# Patient Record
Sex: FEMALE | Race: BLACK OR AFRICAN AMERICAN | NOT HISPANIC OR LATINO | Employment: FULL TIME | URBAN - METROPOLITAN AREA
[De-identification: names, ages, dates, MRNs, and addresses within clinical notes are randomized per-mention and may not be internally consistent; named-entity substitution may affect disease eponyms.]

---
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
## 2020-02-23 ENCOUNTER — HOSPITAL ENCOUNTER (EMERGENCY)
Facility: HOSPITAL | Age: 20
Discharge: HOME | End: 2020-02-23
Attending: EMERGENCY MEDICINE
Payer: MEDICAID

## 2020-02-23 VITALS
TEMPERATURE: 98 F | SYSTOLIC BLOOD PRESSURE: 110 MMHG | DIASTOLIC BLOOD PRESSURE: 78 MMHG | OXYGEN SATURATION: 100 % | RESPIRATION RATE: 18 BRPM | HEART RATE: 89 BPM | WEIGHT: 132 LBS | BODY MASS INDEX: 23.39 KG/M2 | HEIGHT: 63 IN

## 2020-02-23 DIAGNOSIS — J02.9 PHARYNGITIS, UNSPECIFIED ETIOLOGY: ICD-10-CM

## 2020-02-23 DIAGNOSIS — M65.4 DE QUERVAIN'S TENOSYNOVITIS, RIGHT: Primary | ICD-10-CM

## 2020-02-23 LAB
B-HCG UR QL: NEGATIVE
CTP QC/QA: YES
CTP QC/QA: YES
S PYO RRNA THROAT QL PROBE: NEGATIVE

## 2020-02-23 PROCEDURE — 99284 EMERGENCY DEPT VISIT MOD MDM: CPT | Mod: ER

## 2020-02-23 PROCEDURE — 81025 URINE PREGNANCY TEST: CPT | Mod: ER | Performed by: EMERGENCY MEDICINE

## 2020-02-23 PROCEDURE — 87081 CULTURE SCREEN ONLY: CPT

## 2020-02-23 PROCEDURE — 87880 STREP A ASSAY W/OPTIC: CPT | Mod: ER

## 2020-02-23 RX ORDER — PREDNISONE 20 MG/1
40 TABLET ORAL DAILY
Qty: 6 TABLET | Refills: 0 | Status: SHIPPED | OUTPATIENT
Start: 2020-02-23 | End: 2020-02-26

## 2020-02-23 RX ORDER — MELOXICAM 7.5 MG/1
7.5 TABLET ORAL DAILY
Qty: 12 TABLET | Refills: 0 | Status: SHIPPED | OUTPATIENT
Start: 2020-02-23 | End: 2023-11-09

## 2020-02-23 NOTE — ED PROVIDER NOTES
Encounter Date: 2/23/2020    SCRIBE #1 NOTE: I, Arabella De Jesus, am scribing for, and in the presence of,  JO ANN Morataya. I have scribed the following portions of the note - Other sections scribed: HPI and ROS.       History     Chief Complaint   Patient presents with    Sore Throat     x 1 day    Wrist Pain     x 1 week. PT is a       19 year old female complains of sore throat that started yesterday. She notes wrist pain for 1 week. Patient reports she is a  and has to carry heavy dishes. She denies trauma, injury, or numbness. She reports taking ibuprofen for wrist pain with little relief. She denies vomiting.    The history is provided by the patient. No  was used.     Review of patient's allergies indicates:   Allergen Reactions    Latex, natural rubber Itching     Past Medical History:   Diagnosis Date    Asthma     childhood    Constipation     Ectopic pregnancy      Past Surgical History:   Procedure Laterality Date    SALPINGECTOMY Left 12/7/2019    Procedure: SALPINGECTOMY;  Surgeon: Felecia Duran MD;  Location: [facility];  Service: OB/GYN;  Laterality: Left;     Family History   Problem Relation Age of Onset    Breast cancer Neg Hx     Colon cancer Neg Hx     Ovarian cancer Neg Hx      Social History     Tobacco Use    Smoking status: Never Smoker    Smokeless tobacco: Never Used   Substance Use Topics    Alcohol use: No    Drug use: No     Review of Systems   Constitutional: Negative.  Negative for fever.   HENT: Positive for sore throat.    Eyes: Negative.    Respiratory: Negative.  Negative for shortness of breath.    Cardiovascular: Negative.  Negative for chest pain.   Gastrointestinal: Negative.  Negative for nausea and vomiting.   Endocrine: Negative.    Genitourinary: Negative.  Negative for dysuria.   Musculoskeletal: Positive for arthralgias. Negative for myalgias.   Skin: Negative.  Negative for rash.   Allergic/Immunologic: Negative.     Neurological: Negative.  Negative for headaches.   Hematological: Negative.  Negative for adenopathy.   Psychiatric/Behavioral: Negative.  Negative for behavioral problems.   All other systems reviewed and are negative.      Physical Exam     Initial Vitals [02/23/20 1649]   BP Pulse Resp Temp SpO2   108/66 91 18 98.6 °F (37 °C) 99 %      MAP       --         Physical Exam    Nursing note and vitals reviewed.  Constitutional: She appears well-developed and well-nourished. She is not diaphoretic. No distress.   HENT:   Head: Normocephalic and atraumatic.   Nasal congestion present without active rhinorrhea. No sinus TTP. Mild b/l tender anterior cervical adenopathy. TMs intact without erythema or swelling; able to discern bony landmarks. No mastoid tenderness or swelling behind the ears. No pain with manipulation of external ears. No oropharyngeal edema, swelling, erythema, tonsillar exudates, uvula deviation, changes in phonation, trismus, drooling. No meningeal signs.      Eyes: Conjunctivae and EOM are normal. Right eye exhibits no discharge. Left eye exhibits no discharge.   Neck: Normal range of motion. No tracheal deviation present. No JVD present.   Cardiovascular: Normal rate, regular rhythm and normal heart sounds. Exam reveals no friction rub.    No murmur heard.  Pulmonary/Chest: Breath sounds normal. No stridor. No respiratory distress. She has no wheezes. She has no rhonchi. She has no rales. She exhibits no tenderness.   Musculoskeletal: Normal range of motion.   Positive Finkelstein's test.  No bony tenderness.  No erythema or swelling. Full ROM of all digits.  Capillary refill less than 2 sec.  Sensation intact and equal. No snuffbox tenderness.   Neurological: She is alert and oriented to person, place, and time.   Skin: Skin is warm and dry. No rash and no abscess noted. No erythema. No pallor.         ED Course   Procedures  Labs Reviewed   CULTURE, STREP A,  THROAT   POCT URINE PREGNANCY   POCT  RAPID STREP A          Imaging Results    None          Medical Decision Making:   History:   Old Medical Records: I decided to obtain old medical records.  Clinical Tests:   Lab Tests: Ordered and Reviewed  ED Management:  Presentation consistent with pharyngitis that is likely of viral etiology.  Strep negative. No peritonsillar abscess or evidence for deep space infection including for Kevin's.  No meningeal signs.  Tolerating p.o..  Wrist pain most consistent with a DeQuervain's tenosynovitis.  No history of trauma. No vascular compromise or infectious process, including for septic joint and flexor tenosynovitis.  Sent home with supportive care.  Advised use of splint at home and while working.  Advising follow-up with PCP.  Strict return precautions discussed with patient who is agreeable to the plan.            Scribe Attestation:   Scribe #1: I performed the above scribed service and the documentation accurately describes the services I performed. I attest to the accuracy of the note.    Scribe attestation: I, New Verduzco, personally performed the services described in this documentation. All medical record entries made by the scribe were at my direction and in my presence.  I have reviewed the chart and agree that the record reflects my personal performance and is accurate and complete                       Clinical Impression:     1. De Quervain's tenosynovitis, right    2. Pharyngitis, unspecified etiology                                New Verduzco PA-C  02/23/20 5350

## 2020-02-25 LAB — BACTERIA THROAT CULT: NORMAL

## 2020-03-16 ENCOUNTER — HOSPITAL ENCOUNTER (EMERGENCY)
Facility: HOSPITAL | Age: 20
Discharge: HOME | End: 2020-03-16
Attending: EMERGENCY MEDICINE
Payer: MEDICAID

## 2020-03-16 DIAGNOSIS — R10.84 GENERALIZED ABDOMINAL PAIN: Primary | ICD-10-CM

## 2020-03-16 PROBLEM — G89.18 POST-OPERATIVE PAIN: Status: RESOLVED | Noted: 2019-12-11 | Resolved: 2020-03-16

## 2020-03-16 LAB
ALBUMIN SERPL-MCNC: 4.2 G/DL (ref 3.3–5.5)
ALP SERPL-CCNC: 53 U/L (ref 42–141)
B-HCG UR QL: NEGATIVE
BILIRUB SERPL-MCNC: 0.6 MG/DL (ref 0.2–1.6)
BILIRUBIN, POC UA: NEGATIVE
BLOOD, POC UA: NEGATIVE
BUN SERPL-MCNC: 8 MG/DL (ref 7–22)
CALCIUM SERPL-MCNC: 9.2 MG/DL (ref 8–10.3)
CHLORIDE SERPL-SCNC: 105 MMOL/L (ref 98–108)
CLARITY, POC UA: CLEAR
COLOR, POC UA: YELLOW
CREAT SERPL-MCNC: 0.7 MG/DL (ref 0.6–1.2)
CTP QC/QA: YES
GLUCOSE SERPL-MCNC: 92 MG/DL (ref 73–118)
GLUCOSE, POC UA: NEGATIVE
KETONES, POC UA: NEGATIVE
LEUKOCYTE EST, POC UA: NEGATIVE
NITRITE, POC UA: NEGATIVE
PH UR STRIP: 6.5 [PH]
POC ALT (SGPT): 14 U/L (ref 10–47)
POC AST (SGOT): 22 U/L (ref 11–38)
POC TCO2: 26 MMOL/L (ref 18–33)
POTASSIUM BLD-SCNC: 3.5 MMOL/L (ref 3.6–5.1)
PROTEIN, POC UA: NEGATIVE
PROTEIN, POC: 7.5 G/DL (ref 6.4–8.1)
SODIUM BLD-SCNC: 141 MMOL/L (ref 128–145)
SPECIFIC GRAVITY, POC UA: 1.02
UROBILINOGEN, POC UA: 0.2 E.U./DL

## 2020-03-16 PROCEDURE — 99285 EMERGENCY DEPT VISIT HI MDM: CPT | Mod: 25,ER

## 2020-03-16 PROCEDURE — 81003 URINALYSIS AUTO W/O SCOPE: CPT | Mod: ER

## 2020-03-16 PROCEDURE — 63600175 PHARM REV CODE 636 W HCPCS: Mod: ER | Performed by: PHYSICIAN ASSISTANT

## 2020-03-16 PROCEDURE — 85025 COMPLETE CBC W/AUTO DIFF WBC: CPT | Mod: ER

## 2020-03-16 PROCEDURE — 96374 THER/PROPH/DIAG INJ IV PUSH: CPT | Mod: 59,ER

## 2020-03-16 PROCEDURE — 80053 COMPREHEN METABOLIC PANEL: CPT | Mod: ER

## 2020-03-16 PROCEDURE — 81025 URINE PREGNANCY TEST: CPT | Mod: ER | Performed by: EMERGENCY MEDICINE

## 2020-03-16 PROCEDURE — 25500020 PHARM REV CODE 255: Mod: ER | Performed by: EMERGENCY MEDICINE

## 2020-03-16 RX ORDER — KETOROLAC TROMETHAMINE 30 MG/ML
15 INJECTION, SOLUTION INTRAMUSCULAR; INTRAVENOUS
Status: COMPLETED | OUTPATIENT
Start: 2020-03-16 | End: 2020-03-16

## 2020-03-16 RX ORDER — IBUPROFEN 800 MG/1
800 TABLET ORAL EVERY 6 HOURS PRN
Qty: 20 TABLET | Refills: 0 | Status: SHIPPED | OUTPATIENT
Start: 2020-03-16 | End: 2023-11-09

## 2020-03-16 RX ADMIN — KETOROLAC TROMETHAMINE 15 MG: 30 INJECTION, SOLUTION INTRAMUSCULAR at 10:03

## 2020-03-16 RX ADMIN — IOHEXOL 75 ML: 350 INJECTION, SOLUTION INTRAVENOUS at 11:03

## 2020-03-17 VITALS
RESPIRATION RATE: 18 BRPM | WEIGHT: 132 LBS | HEIGHT: 63 IN | BODY MASS INDEX: 23.39 KG/M2 | DIASTOLIC BLOOD PRESSURE: 68 MMHG | OXYGEN SATURATION: 100 % | HEART RATE: 92 BPM | TEMPERATURE: 99 F | SYSTOLIC BLOOD PRESSURE: 112 MMHG

## 2020-03-17 NOTE — ED TRIAGE NOTES
Pt presents to ER with c/o lower abdominal pain at incision site with tenderness to palpation.  Denies any fever, nausea or vomiting.  Pt states she has had pain to area since the surgery.

## 2020-03-17 NOTE — ED TRIAGE NOTES
19 y.o. Female to ED with c.o. Pain and swelling around incision site. Patient report she had surgery for an ectopic pregnancy on 12/3/2020. Patient denies fever/ chills. Pattient endorses frequent urination, denies dysuria, denies hematuria. Patient endorses nausea and diarrhea, denies vomiting.

## 2020-03-17 NOTE — ED PROVIDER NOTES
Encounter Date: 3/16/2020    SCRIBE #1 NOTE: I, Saravanan Syed, am scribing for, and in the presence of,  JO ANN Thornton. I have scribed the following portions of the note - Other sections scribed: HPI, ROS, PE.       History     Chief Complaint   Patient presents with    Wound Check     19 year old female with pain with swelling to her incision site on her abdomen due to an ectopic pregnancy on 12/3/2019. States its been hurting since the surgery, but has worsened a week and a half ago. Patient also complains of lower back pain, urinary frequency, nausea, and diarrhea. Denies any fevers, chills, vaginal discharge, dysuria, hematuria, or vomiting.    The history is provided by the patient. No  was used.     Review of patient's allergies indicates:   Allergen Reactions    Latex, natural rubber Itching     Past Medical History:   Diagnosis Date    Asthma     childhood    Constipation     Ectopic pregnancy      Past Surgical History:   Procedure Laterality Date    SALPINGECTOMY Left 12/7/2019    Procedure: SALPINGECTOMY;  Surgeon: Felecia Duran MD;  Location: [facility];  Service: OB/GYN;  Laterality: Left;     Family History   Problem Relation Age of Onset    Breast cancer Neg Hx     Colon cancer Neg Hx     Ovarian cancer Neg Hx      Social History     Tobacco Use    Smoking status: Never Smoker    Smokeless tobacco: Never Used   Substance Use Topics    Alcohol use: No    Drug use: No     Review of Systems   Constitutional: Negative for chills and fever.   Gastrointestinal: Positive for abdominal pain, diarrhea and nausea. Negative for vomiting.   Genitourinary: Positive for frequency. Negative for dysuria, hematuria and vaginal discharge.   Musculoskeletal: Positive for back pain.   Skin: Positive for wound.       Physical Exam     Initial Vitals [03/16/20 2039]   BP Pulse Resp Temp SpO2   110/65 94 16 99 °F (37.2 °C) 100 %      MAP       --         Physical Exam    Nursing note and  vitals reviewed.  Constitutional: She appears well-developed and well-nourished.   HENT:   Head: Normocephalic and atraumatic.   Right Ear: External ear normal.   Left Ear: External ear normal.   Nose: Nose normal.   Eyes: Conjunctivae are normal.   Neck: Normal range of motion. Neck supple.   Cardiovascular: Normal rate and regular rhythm. Exam reveals no gallop and no friction rub.    No murmur heard.  Pulmonary/Chest: Breath sounds normal. No respiratory distress. She has no wheezes. She has no rhonchi. She has no rales.   Abdominal: Soft. There is tenderness. There is guarding.   Musculoskeletal: Normal range of motion.   Neurological: She is alert and oriented to person, place, and time.   Skin: Skin is warm and dry.   Psychiatric: She has a normal mood and affect.         ED Course   Procedures  Labs Reviewed   POCT CMP - Abnormal; Notable for the following components:       Result Value    POC Potassium 3.5 (*)     All other components within normal limits   POCT URINE PREGNANCY   POCT URINALYSIS W/O SCOPE   POCT URINALYSIS W/O SCOPE   POCT CBC   POCT CMP          Imaging Results          CT Abdomen Pelvis With Contrast (Final result)  Result time 03/16/20 23:32:25    Final result by Ron Marquez MD (03/16/20 23:32:25)                 Impression:      Retroverted uterus with small amount of free fluid in the cul de sac.    No abscess or inflammatory phlegmon identified.      Electronically signed by: Ron Marquez MD  Date:    03/16/2020  Time:    23:32             Narrative:    EXAMINATION:  CT ABDOMEN PELVIS WITH CONTRAST    CLINICAL HISTORY:  Infection, abdomen-pelvis;    TECHNIQUE:  Low dose axial images, sagittal and coronal reformations were obtained from the lung bases to the pubic symphysis following the IV administration of 75 mL of Omnipaque 350 .  Oral contrast was not administered.    COMPARISON:  December 11, 2019.    FINDINGS:  Abdomen:    - Lower thorax:Unremarkable.    - Lung bases: No  infiltrates and no nodules.    - Liver: No focal mass.    - Gallbladder: No calcified gallstones.    - Bile Ducts: No evidence of intra or extra hepatic biliary ductal dilation.    - Spleen: Negative.    - Kidneys: No mass or hydronephrosis.  Rotated and ptotic right kidney, unchanged.    - Adrenals: Unremarkable.    - Pancreas: No mass or peripancreatic fat stranding.    - Retroperitoneum:  No significant adenopathy.    - Vascular: No abdominal aortic aneurysm.    - Abdominal wall:  Unremarkable.    Pelvis:    No pelvic mass or adenopathy.  Retroverted uterus.  Small amount of free fluid in the cul de sac.    Bowel/Mesentery:    No evidence of bowel obstruction or inflammation.  Retrocecal appendix appears unremarkable.    Bones:  No acute osseous abnormality and no suspicious lytic or blastic lesion.                                 Medical Decision Making:   History:   Old Medical Records: I decided to obtain old medical records.  Initial Assessment:   Patient has tenderness and all abdominal quadrants.  Incision site has no evidence of erythema, fluctuance, or induration to suggest abscess.  Patient is afebrile and nontoxic-appearing.  Vitals are stable.  UPT is negative.  Urinalysis is negative for acute infection.  CBC and CMP unremarkable.  CT abdomen pelvis shows no acute intra-abdominal abnormality.  Patient denies vaginal discharge or pelvic pain therefore I doubt PID at this time. Etiology of patient's symptoms are unknown at this time.  Patient instructed to follow up with primary care physician.  I will discharge patient home with ibuprofen.  Patient is stable for discharge.  Clinical Tests:   Lab Tests: Ordered and Reviewed  The following lab test(s) were unremarkable: UPT            Scribe Attestation:   Scribe #1: I performed the above scribed service and the documentation accurately describes the services I performed. I attest to the accuracy of the note.    The document was produced by a scribe  under my direction and in my presence.  I agree with the content of the note and have made any necessary edits.    Mady CHERRY                       Clinical Impression:     1. Generalized abdominal pain            Disposition:   Disposition: Discharged  Condition: Stable     ED Disposition Condition    Discharge Stable        ED Prescriptions     Medication Sig Dispense Start Date End Date Auth. Provider    ibuprofen (ADVIL,MOTRIN) 800 MG tablet Take 1 tablet (800 mg total) by mouth every 6 (six) hours as needed. 20 tablet 3/16/2020  JO ANN Dill        Follow-up Information     Follow up With Specialties Details Why Contact Info    Main King MD General Practice   [address] [place] [address]  [place] [address]  [phone]                                       JO ANN Dill  03/16/20 1895

## 2021-04-15 ENCOUNTER — PATIENT MESSAGE (OUTPATIENT)
Dept: RESEARCH | Facility: HOSPITAL | Age: 21
End: 2021-04-15

## 2021-10-01 ENCOUNTER — HOSPITAL ENCOUNTER (EMERGENCY)
Facility: HOSPITAL | Age: 21
Discharge: HOME | End: 2021-10-01
Attending: EMERGENCY MEDICINE
Payer: MEDICAID

## 2021-10-01 VITALS
OXYGEN SATURATION: 100 % | DIASTOLIC BLOOD PRESSURE: 81 MMHG | HEART RATE: 71 BPM | SYSTOLIC BLOOD PRESSURE: 134 MMHG | WEIGHT: 150 LBS | TEMPERATURE: 99 F | BODY MASS INDEX: 26.58 KG/M2 | HEIGHT: 63 IN | RESPIRATION RATE: 18 BRPM

## 2021-10-01 DIAGNOSIS — R19.7 DIARRHEA, UNSPECIFIED TYPE: Primary | ICD-10-CM

## 2021-10-01 LAB
ALBUMIN SERPL-MCNC: 4.1 G/DL (ref 3.3–5.5)
ALP SERPL-CCNC: 48 U/L (ref 42–141)
B-HCG UR QL: NEGATIVE
BILIRUB SERPL-MCNC: 0.4 MG/DL (ref 0.2–1.6)
BILIRUBIN, POC UA: NEGATIVE
BLOOD, POC UA: NEGATIVE
BUN SERPL-MCNC: 10 MG/DL (ref 7–22)
CALCIUM SERPL-MCNC: 9.6 MG/DL (ref 8–10.3)
CHLORIDE SERPL-SCNC: 107 MMOL/L (ref 98–108)
CLARITY, POC UA: ABNORMAL
COLOR, POC UA: YELLOW
CREAT SERPL-MCNC: 0.8 MG/DL (ref 0.6–1.2)
CTP QC/QA: YES
CTP QC/QA: YES
FECAL OCCULT BLOOD, POC: NEGATIVE
GLUCOSE SERPL-MCNC: 112 MG/DL (ref 73–118)
GLUCOSE, POC UA: NEGATIVE
KETONES, POC UA: NEGATIVE
LEUKOCYTE EST, POC UA: NEGATIVE
NITRITE, POC UA: NEGATIVE
PH UR STRIP: 8.5 [PH]
POC ALT (SGPT): 16 U/L (ref 10–47)
POC AST (SGOT): 33 U/L (ref 11–38)
POC TCO2: 26 MMOL/L (ref 18–33)
POTASSIUM BLD-SCNC: 3.8 MMOL/L (ref 3.6–5.1)
PROTEIN, POC UA: ABNORMAL
PROTEIN, POC: 7.3 G/DL (ref 6.4–8.1)
SODIUM BLD-SCNC: 144 MMOL/L (ref 128–145)
SPECIFIC GRAVITY, POC UA: 1.02
UROBILINOGEN, POC UA: 0.2 E.U./DL

## 2021-10-01 PROCEDURE — 81003 URINALYSIS AUTO W/O SCOPE: CPT | Mod: ER

## 2021-10-01 PROCEDURE — 85025 COMPLETE CBC W/AUTO DIFF WBC: CPT | Mod: ER

## 2021-10-01 PROCEDURE — 82270 OCCULT BLOOD FECES: CPT | Mod: ER

## 2021-10-01 PROCEDURE — 25000003 PHARM REV CODE 250: Mod: ER | Performed by: EMERGENCY MEDICINE

## 2021-10-01 PROCEDURE — 99284 EMERGENCY DEPT VISIT MOD MDM: CPT | Mod: 25,ER

## 2021-10-01 PROCEDURE — 81025 URINE PREGNANCY TEST: CPT | Mod: ER | Performed by: EMERGENCY MEDICINE

## 2021-10-01 PROCEDURE — 80053 COMPREHEN METABOLIC PANEL: CPT | Mod: ER

## 2021-10-01 PROCEDURE — 96361 HYDRATE IV INFUSION ADD-ON: CPT | Mod: ER

## 2021-10-01 PROCEDURE — 96374 THER/PROPH/DIAG INJ IV PUSH: CPT | Mod: ER

## 2021-10-01 PROCEDURE — 63600175 PHARM REV CODE 636 W HCPCS: Mod: ER | Performed by: EMERGENCY MEDICINE

## 2021-10-01 RX ORDER — ONDANSETRON 8 MG/1
8 TABLET, ORALLY DISINTEGRATING ORAL EVERY 6 HOURS PRN
Qty: 12 TABLET | Refills: 0 | OUTPATIENT
Start: 2021-10-01 | End: 2022-06-03

## 2021-10-01 RX ORDER — ONDANSETRON 2 MG/ML
8 INJECTION INTRAMUSCULAR; INTRAVENOUS
Status: COMPLETED | OUTPATIENT
Start: 2021-10-01 | End: 2021-10-01

## 2021-10-01 RX ADMIN — ONDANSETRON 8 MG: 2 INJECTION INTRAMUSCULAR; INTRAVENOUS at 07:10

## 2021-10-01 RX ADMIN — SODIUM CHLORIDE 1000 ML: 0.9 INJECTION, SOLUTION INTRAVENOUS at 07:10

## 2021-10-04 ENCOUNTER — HOSPITAL ENCOUNTER (EMERGENCY)
Facility: HOSPITAL | Age: 21
Discharge: HOME | End: 2021-10-04
Attending: EMERGENCY MEDICINE
Payer: MEDICAID

## 2021-10-04 VITALS
HEART RATE: 59 BPM | BODY MASS INDEX: 26.58 KG/M2 | OXYGEN SATURATION: 100 % | RESPIRATION RATE: 18 BRPM | DIASTOLIC BLOOD PRESSURE: 57 MMHG | HEIGHT: 63 IN | TEMPERATURE: 99 F | SYSTOLIC BLOOD PRESSURE: 101 MMHG | WEIGHT: 150 LBS

## 2021-10-04 DIAGNOSIS — R11.2 NAUSEA VOMITING AND DIARRHEA: Primary | ICD-10-CM

## 2021-10-04 DIAGNOSIS — R19.7 NAUSEA VOMITING AND DIARRHEA: Primary | ICD-10-CM

## 2021-10-04 LAB
ALBUMIN SERPL BCP-MCNC: 4.3 G/DL (ref 3.5–5.2)
ALP SERPL-CCNC: 43 U/L (ref 55–135)
ALT SERPL W/O P-5'-P-CCNC: 21 U/L (ref 10–44)
ANION GAP SERPL CALC-SCNC: 13 MMOL/L (ref 8–16)
AST SERPL-CCNC: 40 U/L (ref 10–40)
B-HCG UR QL: NEGATIVE
BASOPHILS # BLD AUTO: 0.05 K/UL (ref 0–0.2)
BASOPHILS NFR BLD: 0.6 % (ref 0–1.9)
BILIRUB SERPL-MCNC: 0.5 MG/DL (ref 0.1–1)
BILIRUB UR QL STRIP: NEGATIVE
BUN SERPL-MCNC: 7 MG/DL (ref 6–20)
CALCIUM SERPL-MCNC: 9.6 MG/DL (ref 8.7–10.5)
CHLORIDE SERPL-SCNC: 109 MMOL/L (ref 95–110)
CLARITY UR: CLEAR
CO2 SERPL-SCNC: 20 MMOL/L (ref 23–29)
COLOR UR: YELLOW
CREAT SERPL-MCNC: 0.8 MG/DL (ref 0.5–1.4)
CTP QC/QA: YES
CTP QC/QA: YES
DIFFERENTIAL METHOD: ABNORMAL
EOSINOPHIL # BLD AUTO: 0.2 K/UL (ref 0–0.5)
EOSINOPHIL NFR BLD: 2.1 % (ref 0–8)
ERYTHROCYTE [DISTWIDTH] IN BLOOD BY AUTOMATED COUNT: 13 % (ref 11.5–14.5)
EST. GFR  (AFRICAN AMERICAN): >60 ML/MIN/1.73 M^2
EST. GFR  (NON AFRICAN AMERICAN): >60 ML/MIN/1.73 M^2
GLUCOSE SERPL-MCNC: 82 MG/DL (ref 70–110)
GLUCOSE UR QL STRIP: NEGATIVE
HCT VFR BLD AUTO: 36.3 % (ref 37–48.5)
HGB BLD-MCNC: 11.9 G/DL (ref 12–16)
HGB UR QL STRIP: NEGATIVE
IMM GRANULOCYTES # BLD AUTO: 0.02 K/UL (ref 0–0.04)
IMM GRANULOCYTES NFR BLD AUTO: 0.2 % (ref 0–0.5)
KETONES UR QL STRIP: ABNORMAL
LEUKOCYTE ESTERASE UR QL STRIP: NEGATIVE
LIPASE SERPL-CCNC: 11 U/L (ref 4–60)
LYMPHOCYTES # BLD AUTO: 2.6 K/UL (ref 1–4.8)
LYMPHOCYTES NFR BLD: 30.8 % (ref 18–48)
MAGNESIUM SERPL-MCNC: 2.1 MG/DL (ref 1.6–2.6)
MCH RBC QN AUTO: 29.5 PG (ref 27–31)
MCHC RBC AUTO-ENTMCNC: 32.8 G/DL (ref 32–36)
MCV RBC AUTO: 90 FL (ref 82–98)
MONOCYTES # BLD AUTO: 0.6 K/UL (ref 0.3–1)
MONOCYTES NFR BLD: 6.9 % (ref 4–15)
NEUTROPHILS # BLD AUTO: 5.1 K/UL (ref 1.8–7.7)
NEUTROPHILS NFR BLD: 59.4 % (ref 38–73)
NITRITE UR QL STRIP: NEGATIVE
NRBC BLD-RTO: 0 /100 WBC
PH UR STRIP: 7 [PH] (ref 5–8)
PLATELET # BLD AUTO: 248 K/UL (ref 150–450)
PMV BLD AUTO: 10.5 FL (ref 9.2–12.9)
POTASSIUM SERPL-SCNC: 3.5 MMOL/L (ref 3.5–5.1)
PROT SERPL-MCNC: 7.2 G/DL (ref 6–8.4)
PROT UR QL STRIP: ABNORMAL
RBC # BLD AUTO: 4.03 M/UL (ref 4–5.4)
SARS-COV-2 RDRP RESP QL NAA+PROBE: NEGATIVE
SODIUM SERPL-SCNC: 142 MMOL/L (ref 136–145)
SP GR UR STRIP: 1.03 (ref 1–1.03)
URN SPEC COLLECT METH UR: ABNORMAL
UROBILINOGEN UR STRIP-ACNC: NEGATIVE EU/DL
WBC # BLD AUTO: 8.55 K/UL (ref 3.9–12.7)

## 2021-10-04 PROCEDURE — U0002 COVID-19 LAB TEST NON-CDC: HCPCS | Performed by: EMERGENCY MEDICINE

## 2021-10-04 PROCEDURE — 96375 TX/PRO/DX INJ NEW DRUG ADDON: CPT

## 2021-10-04 PROCEDURE — 63600175 PHARM REV CODE 636 W HCPCS: Performed by: PHYSICIAN ASSISTANT

## 2021-10-04 PROCEDURE — 99284 EMERGENCY DEPT VISIT MOD MDM: CPT | Mod: 25

## 2021-10-04 PROCEDURE — 81025 URINE PREGNANCY TEST: CPT | Performed by: EMERGENCY MEDICINE

## 2021-10-04 PROCEDURE — 81003 URINALYSIS AUTO W/O SCOPE: CPT | Performed by: EMERGENCY MEDICINE

## 2021-10-04 PROCEDURE — 80053 COMPREHEN METABOLIC PANEL: CPT | Performed by: PHYSICIAN ASSISTANT

## 2021-10-04 PROCEDURE — 83735 ASSAY OF MAGNESIUM: CPT | Performed by: PHYSICIAN ASSISTANT

## 2021-10-04 PROCEDURE — 25000003 PHARM REV CODE 250: Performed by: PHYSICIAN ASSISTANT

## 2021-10-04 PROCEDURE — 83690 ASSAY OF LIPASE: CPT | Performed by: PHYSICIAN ASSISTANT

## 2021-10-04 PROCEDURE — 96365 THER/PROPH/DIAG IV INF INIT: CPT

## 2021-10-04 PROCEDURE — 85025 COMPLETE CBC W/AUTO DIFF WBC: CPT | Performed by: PHYSICIAN ASSISTANT

## 2021-10-04 RX ORDER — KETOROLAC TROMETHAMINE 30 MG/ML
15 INJECTION, SOLUTION INTRAMUSCULAR; INTRAVENOUS
Status: COMPLETED | OUTPATIENT
Start: 2021-10-04 | End: 2021-10-04

## 2021-10-04 RX ORDER — DICYCLOMINE HYDROCHLORIDE 20 MG/1
20 TABLET ORAL 2 TIMES DAILY PRN
Qty: 10 TABLET | Refills: 0 | Status: SHIPPED | OUTPATIENT
Start: 2021-10-04 | End: 2021-11-03

## 2021-10-04 RX ORDER — ONDANSETRON 2 MG/ML
4 INJECTION INTRAMUSCULAR; INTRAVENOUS
Status: COMPLETED | OUTPATIENT
Start: 2021-10-04 | End: 2021-10-04

## 2021-10-04 RX ORDER — FAMOTIDINE 10 MG/ML
20 INJECTION INTRAVENOUS
Status: COMPLETED | OUTPATIENT
Start: 2021-10-04 | End: 2021-10-04

## 2021-10-04 RX ORDER — PROMETHAZINE HYDROCHLORIDE 25 MG/1
25 TABLET ORAL EVERY 6 HOURS PRN
Qty: 30 TABLET | Refills: 0 | Status: SHIPPED | OUTPATIENT
Start: 2021-10-04 | End: 2023-11-09

## 2021-10-04 RX ADMIN — FAMOTIDINE 20 MG: 10 INJECTION INTRAVENOUS at 02:10

## 2021-10-04 RX ADMIN — ONDANSETRON 4 MG: 2 INJECTION INTRAMUSCULAR; INTRAVENOUS at 03:10

## 2021-10-04 RX ADMIN — SODIUM CHLORIDE 1000 ML: 0.9 INJECTION, SOLUTION INTRAVENOUS at 01:10

## 2021-10-04 RX ADMIN — KETOROLAC TROMETHAMINE 15 MG: 30 INJECTION, SOLUTION INTRAMUSCULAR at 03:10

## 2021-10-04 RX ADMIN — PROMETHAZINE HYDROCHLORIDE 25 MG: 25 INJECTION INTRAMUSCULAR; INTRAVENOUS at 02:10

## 2021-10-15 ENCOUNTER — OFFICE VISIT (OUTPATIENT)
Dept: URGENT CARE | Facility: CLINIC | Age: 21
End: 2021-10-15
Payer: MEDICAID

## 2021-10-15 VITALS
RESPIRATION RATE: 19 BRPM | HEART RATE: 79 BPM | HEIGHT: 63 IN | TEMPERATURE: 99 F | DIASTOLIC BLOOD PRESSURE: 75 MMHG | WEIGHT: 150 LBS | SYSTOLIC BLOOD PRESSURE: 118 MMHG | OXYGEN SATURATION: 97 % | BODY MASS INDEX: 26.58 KG/M2

## 2021-10-15 DIAGNOSIS — K21.9 GASTROESOPHAGEAL REFLUX DISEASE WITHOUT ESOPHAGITIS: Primary | ICD-10-CM

## 2021-10-15 DIAGNOSIS — Z32.00 POSSIBLE PREGNANCY, NOT CONFIRMED: ICD-10-CM

## 2021-10-15 DIAGNOSIS — K59.00 CONSTIPATION, UNSPECIFIED CONSTIPATION TYPE: ICD-10-CM

## 2021-10-15 DIAGNOSIS — R11.0 NAUSEA: ICD-10-CM

## 2021-10-15 PROBLEM — L29.9 ITCH: Status: ACTIVE | Noted: 2021-10-15

## 2021-10-15 PROBLEM — F41.1 GENERALIZED ANXIETY DISORDER: Status: ACTIVE | Noted: 2021-10-15

## 2021-10-15 PROBLEM — Z20.2 EXPOSURE TO SEXUALLY TRANSMITTED DISEASE (STD): Status: ACTIVE | Noted: 2021-10-15

## 2021-10-15 PROBLEM — L30.9 ECZEMA: Status: ACTIVE | Noted: 2021-10-15

## 2021-10-15 PROBLEM — R07.9 CHEST PAIN: Status: ACTIVE | Noted: 2021-10-15

## 2021-10-15 PROBLEM — N64.89 PENDULOUS BREAST: Status: ACTIVE | Noted: 2021-10-15

## 2021-10-15 PROBLEM — A56.2 CHLAMYDIAL INFECTION OF GENITOURINARY TRACT: Status: ACTIVE | Noted: 2021-07-13

## 2021-10-15 PROBLEM — Z20.822 EXPOSURE TO COVID-19 VIRUS: Status: ACTIVE | Noted: 2021-10-15

## 2021-10-15 PROBLEM — B00.9 HERPES SIMPLEX VIRAL INFECTION: Status: ACTIVE | Noted: 2021-07-12

## 2021-10-15 PROBLEM — F41.9 ANXIETY: Status: ACTIVE | Noted: 2020-02-11

## 2021-10-15 PROBLEM — Z87.59 HISTORY OF ECTOPIC PREGNANCY: Status: ACTIVE | Noted: 2021-08-06

## 2021-10-15 LAB
B-HCG UR QL: NEGATIVE
BILIRUB UR QL STRIP: NEGATIVE
CTP QC/QA: YES
CTP QC/QA: YES
GLUCOSE UR QL STRIP: NEGATIVE
KETONES UR QL STRIP: POSITIVE
LEUKOCYTE ESTERASE UR QL STRIP: NEGATIVE
PH, POC UA: 6 (ref 5–8)
POC BLOOD, URINE: NEGATIVE
POC NITRATES, URINE: NEGATIVE
PROT UR QL STRIP: NEGATIVE
SARS-COV-2 RDRP RESP QL NAA+PROBE: NEGATIVE
SP GR UR STRIP: 1.02 (ref 1–1.03)
UROBILINOGEN UR STRIP-ACNC: ABNORMAL (ref 0.1–1.1)

## 2021-10-15 PROCEDURE — 81025 URINE PREGNANCY TEST: CPT | Mod: S$GLB,,, | Performed by: NURSE PRACTITIONER

## 2021-10-15 PROCEDURE — 81025 POCT URINE PREGNANCY: ICD-10-PCS | Mod: S$GLB,,, | Performed by: NURSE PRACTITIONER

## 2021-10-15 PROCEDURE — 99214 PR OFFICE/OUTPT VISIT, EST, LEVL IV, 30-39 MIN: ICD-10-PCS | Mod: 25,S$GLB,CS, | Performed by: NURSE PRACTITIONER

## 2021-10-15 PROCEDURE — 99214 OFFICE O/P EST MOD 30 MIN: CPT | Mod: 25,S$GLB,CS, | Performed by: NURSE PRACTITIONER

## 2021-10-15 PROCEDURE — 81003 URINALYSIS AUTO W/O SCOPE: CPT | Mod: QW,S$GLB,, | Performed by: NURSE PRACTITIONER

## 2021-10-15 PROCEDURE — U0002 COVID-19 LAB TEST NON-CDC: HCPCS | Mod: QW,S$GLB,, | Performed by: NURSE PRACTITIONER

## 2021-10-15 PROCEDURE — U0002: ICD-10-PCS | Mod: QW,S$GLB,, | Performed by: NURSE PRACTITIONER

## 2021-10-15 PROCEDURE — 81003 POCT URINALYSIS, DIPSTICK, MANUAL, W/O SCOPE: ICD-10-PCS | Mod: QW,S$GLB,, | Performed by: NURSE PRACTITIONER

## 2021-10-15 RX ORDER — TIZANIDINE 4 MG/1
4 TABLET ORAL 2 TIMES DAILY PRN
COMMUNITY
Start: 2021-07-07 | End: 2023-11-09

## 2021-10-15 RX ORDER — MEDROXYPROGESTERONE ACETATE 150 MG/ML
150 INJECTION, SUSPENSION INTRAMUSCULAR
COMMUNITY
Start: 2021-07-29 | End: 2023-11-09

## 2021-10-15 RX ORDER — OMEPRAZOLE 40 MG/1
CAPSULE, DELAYED RELEASE ORAL
COMMUNITY
Start: 2021-07-21 | End: 2021-10-15

## 2021-10-15 RX ORDER — OMEPRAZOLE 20 MG/1
20 CAPSULE, DELAYED RELEASE ORAL DAILY PRN
Qty: 30 CAPSULE | Refills: 0 | Status: SHIPPED | OUTPATIENT
Start: 2021-10-15 | End: 2023-11-09

## 2021-10-15 RX ORDER — TRIAMCINOLONE ACETONIDE 1 MG/G
OINTMENT TOPICAL 2 TIMES DAILY
COMMUNITY
Start: 2021-07-07

## 2021-10-15 RX ORDER — OMEPRAZOLE 40 MG/1
40 CAPSULE, DELAYED RELEASE ORAL DAILY
COMMUNITY
Start: 2021-07-21 | End: 2021-10-15

## 2021-10-15 RX ORDER — TRIAMCINOLONE ACETONIDE 1 MG/G
1 OINTMENT TOPICAL 2 TIMES DAILY
COMMUNITY

## 2021-10-15 RX ORDER — TIZANIDINE 4 MG/1
TABLET ORAL
COMMUNITY
Start: 2021-07-07 | End: 2023-11-09

## 2021-10-29 ENCOUNTER — HOSPITAL ENCOUNTER (EMERGENCY)
Facility: HOSPITAL | Age: 21
Discharge: HOME | End: 2021-10-29
Attending: EMERGENCY MEDICINE
Payer: MEDICAID

## 2021-10-29 VITALS
TEMPERATURE: 99 F | OXYGEN SATURATION: 99 % | HEIGHT: 63 IN | SYSTOLIC BLOOD PRESSURE: 113 MMHG | DIASTOLIC BLOOD PRESSURE: 66 MMHG | BODY MASS INDEX: 25.34 KG/M2 | WEIGHT: 143 LBS | HEART RATE: 69 BPM | RESPIRATION RATE: 18 BRPM

## 2021-10-29 DIAGNOSIS — R10.32 LEFT LOWER QUADRANT ABDOMINAL PAIN: Primary | ICD-10-CM

## 2021-10-29 LAB
B-HCG UR QL: NEGATIVE
BILIRUBIN, POC UA: NEGATIVE
BLOOD, POC UA: NEGATIVE
CLARITY, POC UA: CLEAR
COLOR, POC UA: ABNORMAL
CTP QC/QA: YES
GLUCOSE, POC UA: NEGATIVE
KETONES, POC UA: ABNORMAL
LEUKOCYTE EST, POC UA: NEGATIVE
NITRITE, POC UA: NEGATIVE
PH UR STRIP: 6 [PH]
PROTEIN, POC UA: NEGATIVE
SPECIFIC GRAVITY, POC UA: >=1.03
UROBILINOGEN, POC UA: 0.2 E.U./DL

## 2021-10-29 PROCEDURE — 81003 URINALYSIS AUTO W/O SCOPE: CPT | Mod: ER

## 2021-10-29 PROCEDURE — 99283 EMERGENCY DEPT VISIT LOW MDM: CPT | Mod: 25,ER

## 2021-10-29 PROCEDURE — 81025 URINE PREGNANCY TEST: CPT | Mod: ER | Performed by: EMERGENCY MEDICINE

## 2021-10-29 RX ORDER — NAPROXEN 500 MG/1
500 TABLET ORAL 2 TIMES DAILY WITH MEALS
Qty: 20 TABLET | Refills: 0 | Status: SHIPPED | OUTPATIENT
Start: 2021-10-29 | End: 2023-11-09

## 2021-11-19 ENCOUNTER — HOSPITAL ENCOUNTER (EMERGENCY)
Facility: HOSPITAL | Age: 21
Discharge: HOME | End: 2021-11-19
Attending: EMERGENCY MEDICINE
Payer: MEDICAID

## 2021-11-19 VITALS
BODY MASS INDEX: 25.34 KG/M2 | TEMPERATURE: 98 F | HEART RATE: 72 BPM | OXYGEN SATURATION: 100 % | DIASTOLIC BLOOD PRESSURE: 57 MMHG | SYSTOLIC BLOOD PRESSURE: 103 MMHG | HEIGHT: 63 IN | WEIGHT: 143 LBS | RESPIRATION RATE: 16 BRPM

## 2021-11-19 DIAGNOSIS — K21.9 GASTROESOPHAGEAL REFLUX DISEASE, UNSPECIFIED WHETHER ESOPHAGITIS PRESENT: ICD-10-CM

## 2021-11-19 DIAGNOSIS — R10.10 UPPER ABDOMINAL PAIN: Primary | ICD-10-CM

## 2021-11-19 LAB
B-HCG UR QL: NEGATIVE
BILIRUBIN, POC UA: NEGATIVE
BLOOD, POC UA: ABNORMAL
CLARITY, POC UA: CLEAR
COLOR, POC UA: YELLOW
CTP QC/QA: YES
GLUCOSE, POC UA: NEGATIVE
KETONES, POC UA: NEGATIVE
LEUKOCYTE EST, POC UA: NEGATIVE
NITRITE, POC UA: NEGATIVE
PH UR STRIP: 6.5 [PH]
PROTEIN, POC UA: NEGATIVE
SPECIFIC GRAVITY, POC UA: 1.02
UROBILINOGEN, POC UA: 0.2 E.U./DL

## 2021-11-19 PROCEDURE — 81003 URINALYSIS AUTO W/O SCOPE: CPT | Mod: ER

## 2021-11-19 PROCEDURE — 81025 URINE PREGNANCY TEST: CPT | Mod: ER | Performed by: EMERGENCY MEDICINE

## 2021-11-19 PROCEDURE — 99283 EMERGENCY DEPT VISIT LOW MDM: CPT | Mod: 25,ER

## 2021-11-19 RX ORDER — ESOMEPRAZOLE MAGNESIUM 40 MG/1
40 CAPSULE, DELAYED RELEASE ORAL DAILY
Qty: 30 CAPSULE | Refills: 0 | Status: SHIPPED | OUTPATIENT
Start: 2021-11-19 | End: 2023-11-09

## 2022-06-03 ENCOUNTER — HOSPITAL ENCOUNTER (EMERGENCY)
Facility: HOSPITAL | Age: 22
Discharge: HOME | End: 2022-06-03
Attending: EMERGENCY MEDICINE
Payer: MEDICAID

## 2022-06-03 VITALS
WEIGHT: 133 LBS | RESPIRATION RATE: 18 BRPM | BODY MASS INDEX: 23.56 KG/M2 | HEART RATE: 77 BPM | TEMPERATURE: 98 F | SYSTOLIC BLOOD PRESSURE: 116 MMHG | OXYGEN SATURATION: 98 % | DIASTOLIC BLOOD PRESSURE: 69 MMHG

## 2022-06-03 DIAGNOSIS — R11.0 NAUSEA: Primary | ICD-10-CM

## 2022-06-03 DIAGNOSIS — B34.9 VIRAL ILLNESS: ICD-10-CM

## 2022-06-03 LAB
B-HCG UR QL: NEGATIVE
BILIRUBIN, POC UA: NEGATIVE
BLOOD, POC UA: ABNORMAL
CLARITY, POC UA: CLEAR
COLOR, POC UA: YELLOW
CTP QC/QA: YES
CTP QC/QA: YES
GLUCOSE, POC UA: ABNORMAL
INFLUENZA A ANTIGEN, POC: NEGATIVE
INFLUENZA B ANTIGEN, POC: NEGATIVE
KETONES, POC UA: NEGATIVE
LEUKOCYTE EST, POC UA: NEGATIVE
NITRITE, POC UA: NEGATIVE
PH UR STRIP: 7.5 [PH]
PROTEIN, POC UA: NEGATIVE
SARS-COV-2 RDRP RESP QL NAA+PROBE: NEGATIVE
SPECIFIC GRAVITY, POC UA: 1.02
UROBILINOGEN, POC UA: 0.2 E.U./DL

## 2022-06-03 PROCEDURE — 81025 URINE PREGNANCY TEST: CPT | Mod: ER | Performed by: EMERGENCY MEDICINE

## 2022-06-03 PROCEDURE — 63600175 PHARM REV CODE 636 W HCPCS: Mod: ER | Performed by: NURSE PRACTITIONER

## 2022-06-03 PROCEDURE — 96374 THER/PROPH/DIAG INJ IV PUSH: CPT | Mod: ER

## 2022-06-03 PROCEDURE — 99284 EMERGENCY DEPT VISIT MOD MDM: CPT | Mod: 25,ER

## 2022-06-03 PROCEDURE — U0002 COVID-19 LAB TEST NON-CDC: HCPCS | Mod: ER | Performed by: NURSE PRACTITIONER

## 2022-06-03 PROCEDURE — 87804 INFLUENZA ASSAY W/OPTIC: CPT | Mod: 59,ER

## 2022-06-03 PROCEDURE — 81003 URINALYSIS AUTO W/O SCOPE: CPT | Mod: ER

## 2022-06-03 PROCEDURE — 96361 HYDRATE IV INFUSION ADD-ON: CPT | Mod: ER

## 2022-06-03 RX ORDER — ONDANSETRON 2 MG/ML
4 INJECTION INTRAMUSCULAR; INTRAVENOUS
Status: COMPLETED | OUTPATIENT
Start: 2022-06-03 | End: 2022-06-03

## 2022-06-03 RX ORDER — ONDANSETRON 4 MG/1
4 TABLET, ORALLY DISINTEGRATING ORAL EVERY 8 HOURS PRN
Qty: 15 TABLET | Refills: 0 | Status: SHIPPED | OUTPATIENT
Start: 2022-06-03 | End: 2022-06-08

## 2022-06-03 RX ADMIN — SODIUM CHLORIDE, SODIUM LACTATE, POTASSIUM CHLORIDE, AND CALCIUM CHLORIDE 1000 ML: .6; .31; .03; .02 INJECTION, SOLUTION INTRAVENOUS at 11:06

## 2022-06-03 RX ADMIN — ONDANSETRON 4 MG: 2 INJECTION INTRAMUSCULAR; INTRAVENOUS at 11:06

## 2022-06-03 NOTE — Clinical Note
"Ellen"Zehra Cardenas was seen and treated in our emergency department on 6/3/2022.  She may return to work on 06/06/2022.       If you have any questions or concerns, please don't hesitate to call.      Anna Rojas NP"

## 2022-06-03 NOTE — ED PROVIDER NOTES
"SCRIBE #1 NOTE: I, Sandy Jame, am scribing for, and in the presence of,  Anna Rojas NP. I have scribed the following portions of the note - Other sections scribed: HPI; ROS; PE.     Source of History:  Patient     Chief complaint:  URI and Abdominal Pain (Pt states," I have a runny nose and my stomach hurts.I have nausea.")      HPI:  Ellen Cardenas is a 22 y.o. female presenting with weakness, abdominal pain, and nausea onset today. Patient reports she has not eaten today due to no appetite. She states her Flu vaccinations are UTD. 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     This is the extent to the patients complaints today here in the emergency department.    ROS: As per HPI and below:    Constitutional: No fever.  No chills.  Eyes: No visual changes.  ENT: No sore throat. No ear pain.  Positive for nasal congestion  Cardiovascular: No chest pain.  Respiratory: No shortness of breath.  GI:  Positive for abdominal pain and nausea. No vomiting.  Genitourinary: No abnormal urination.  Neurologic: No headache. No focal weakness.  No numbness.  MSK: no back pain.  Integument: No rashes or lesions.  Hematologic: No easy bruising.  Endocrine: No excessive thirst or urination.    Review of patient's allergies indicates:   Allergen Reactions    Latex, natural rubber Itching       PMH:  As per HPI and below:  Past Medical History:   Diagnosis Date    Asthma     childhood    Constipation     Ectopic pregnancy      Past Surgical History:   Procedure Laterality Date    LAPAROSCOPIC SURGICAL REMOVAL OF ECTOPIC PREGNANCY      SALPINGECTOMY Left 12/7/2019    Procedure: SALPINGECTOMY;  Surgeon: Felecia Duran MD;  Location: [facility];  Service: OB/GYN;  Laterality: Left;       Social History     Tobacco Use    Smoking status: Never Smoker    Smokeless tobacco: Never Used   Substance Use Topics    Alcohol use: Yes     Comment: OCCASIONAL    Drug use: Yes     Types: Marijuana "       Physical Exam:    /65 (BP Location: Right arm, Patient Position: Sitting)   Pulse 61   Temp 98.5 °F (36.9 °C) (Oral)   Resp 18   Wt 60.3 kg (133 lb)   LMP 03/23/2022 (Exact Date)   SpO2 100%   BMI 23.56 kg/m²     Nursing note and vital signs reviewed.    Constitutional: No acute distress.  Nontoxic.  Vital signs stable  Eyes: No conjunctival injection.Extraocular muscles are intact.  ENT: Oropharynx clear.  Normal phonation.   Cardiovascular: Regular rate and rhythm.  No murmurs. No gallops. No rubs  Respiratory: Clear to auscultation bilaterally.  Good air movement.  No wheezes.  No rhonchi. No rales. No accessory muscle use.  Abdomen: Soft.  Not distended.  Nontender.  No guarding.  No rebound. Non-peritoneal.  Musculoskeletal: Good range of motion all joints.  No deformities.  Neck supple.  No meningismus.  Skin: No rashes seen.  Good turgor.  No abrasions.  No ecchymoses.  Neurologic: Motor intact.  Sensation intact.  No ataxia. No focal neurological deficits.  Psych: Appropriate, conversant    Labs/Imaging that has been ordered has been reviewed by myself.    I decided to obtain the patient's medical records.      MDM/ Differential Dx:    Emergent evaluation of a 23 yo female presenting for intermittent nausea and abdominal cramping earlier this morning.  Patient states symptoms began at approximately 3:00 a.m..  Patient also endorses nasal congestion.  Patient denies taking anything for her symptoms.  On exam pt is A&Ox3. VSS. Nonfebrile and nontoxic appearing. Breath sounds clear bilaterally. Mucous membranes pink and moist. Tonsils with no redness, erythema or exudates. Abdomen soft and nontender. No rebound or guarding appreciated on exam.   BS WNL.  Pt speaking in full sentences.  Steady gait appreciated. Cap refill < 3 seconds.      Differential diagnoses include but are not limited to viral illness, UTI, COVID, influenza, constipation, others.      I will get COVID, influenza, urine,  imaging and reassess.    ED Course as of 06/03/22 1241   Fri Jun 03, 2022   1236 Patient reassessed.  Patient states feeling better after IV fluids.  UA shows no acute infectious process.  COVID and influenza negative.  Pregnancy negative.  X-ray negative for any constipation.  Patient advised will prescribe Zofran for intermittent nausea.  Advised to increase fluids and bland diet.  Follow-up with PCP as needed.  Strict return to ED precautions discussed.  Patient verbalized understanding of this plan of care.  All questions and concerns addressed. [RZ]   1241 Patient is hemodynamically stable, vital signs are normal. Discharge instructions given. Return to ED precautions discussed. Follow up as directed. Pt verbalized understanding of this plan.  Pt is stable for discharge.  [RZ]      ED Course User Index  [RZ] Anna Rojas NP                Scribe Attestation:   Scribe #1: I performed the above scribed service and the documentation accurately describes the services I performed. I attest to the accuracy of the note.      I, Anna Rojas, personally performed the services described in this documentation. All medical record entries made by the scribe were at my direction and in my presence.  I have reviewed the chart and agree that the record reflects my personal performance and is accurate and complete.     Anna Rojas, ACNP, FNP        Diagnostic Impression:    1. Nausea    2. Viral illness         ED Disposition Condition    Discharge Stable          ED Prescriptions     Medication Sig Dispense Start Date End Date Auth. Provider    ondansetron (ZOFRAN-ODT) 4 MG TbDL Take 1 tablet (4 mg total) by mouth every 8 (eight) hours as needed (nausea). 15 tablet 6/3/2022 6/8/2022 Anna Rojas NP        Follow-up Information     Follow up With Specialties Details Why Contact Info    Main King MD General Practice Schedule an appointment as soon as possible for a visit  As needed [address]  [place] [address]  [phone]               Anna Rojas, NP  06/03/22 1243

## 2022-06-03 NOTE — DISCHARGE INSTRUCTIONS
Thank you for coming to our Emergency Department today. It is important to remember that some problems are difficult to diagnose and may not be found during your Emergency Department visit.     Be sure to follow up with your primary care doctor and review all labs/imaging/tests that were performed during this visit with them. Some labs/tests may be outside of the normal range and require non-emergent follow-up and further investigation to help diagnose/exclude/prevent complications or other medical conditions.    If you do not have a primary care doctor, you may contact the one listed on your discharge paperwork or you may also call the [facility] Appointment Desk at [phone] to schedule an appointment and establish care with one. It is important to your health that you have a primary care doctor.    Please take all medications as directed. All medications may potentially have side-effects and it is impossible to predict which medications may give you side-effects or what side-effects (if any) they will give you.. If you feel that you are having a negative effect or side-effect of any medication you should immediately stop taking them and seek medical attention. If you feel that you are having a life-threatening reaction call 911.    Return to the ER with any questions/concerns, new/concerning symptoms, worsening or failure to improve.     Do not drive, swim, climb to height, take a bath or make any important decisions for 24 hours if you have received any pain medications, sedatives or mood altering drugs during your ER visit.        BELOW THIS LINE ONLY APPLIES IF YOU HAVE A COVID TEST PENDING OR IF YOU HAVE BEEN DIAGNOSED WITH COVID:  Please access [facility] to review the results of your test. Until the results of your COVID test return, you should isolate yourself so as not to potentially spread illness to others.   If your COVID test returns positive, you should isolate yourself so as not to spread  illness to others. After five full days, if you are feeling better and you have not had fever for 24 hours, you can return to your typical daily activities, but you must wear a mask around others for an additional 5 days.   If your COVID test returns negative and you are either unvaccinated or more than six months out from your two-dose vaccine and are not yet boosted, you should still quarantine for 5 full days followed by strict mask use for an additional 5 full days.   If your COVID test returns negative and you have received your 2-dose initial vaccine as well as a booster, you should continue strict mask use for 10 full days after the exposure.  For all those exposed, best practice includes a test at day 5 after the exposure. This can be a home test or a test through one of the many testing centers throughout our community.   Masking is always advised to limit the spread of COVID. Cdc.gov is an excellent site to obtain the latest up to date recommendations regarding COVID and COVID testing.     CDC Testing and Quarantine Guidelines for patients with exposure to a known-positive COVID-19 person:  A close exposure is defined as anyone who has had an exposure (masked or unmasked) to a known COVID -19 positive person within 6 feet of someone for a cumulative total of 15 minutes or more over a 24-hour period.   Vaccinated and/or if you recently had a positive covid test within 90 days do NOT need to quarantine after contact with someone who had COVID-19 unless you develop symptoms.   Fully vaccinated people who have not had a positive test within 90 days, should get tested 3-5 days after their exposure, even if they don't have symptoms and wear a mask indoors in public for 14 days following exposure or until their test result is negative.      Unvaccinated and/or NOT had a positive test within 90 days and meet close exposure  You are required by CDC guidelines to quarantine for at least 5 days from time of  exposure followed by 5 days of strict masking. It is recommended, but not required to test after 5 days, unless you develop symptoms, in which case you should test at that time.  If you get tested after 5 days and your test is positive, your 5 day period of isolation starts the day of the positive test.    If your exposure does not meet the above definition, you can return to your normal daily activities to include social distancing, wearing a mask and frequent handwashing.      Here is a link to guidance from the CDC:  https://www.cdc.gov/media/releases/2021/s1227-isolation-quarantine-guidance.html      [facility]  Health Testing Sites:  https://ldh.la.gov/page/3934      [facility] website with testing locations and guidance:  [email or web address]

## 2022-08-24 ENCOUNTER — HOSPITAL ENCOUNTER (EMERGENCY)
Facility: HOSPITAL | Age: 22
Discharge: HOME | End: 2022-08-24
Attending: EMERGENCY MEDICINE
Payer: MEDICAID

## 2022-08-24 VITALS
HEIGHT: 63 IN | RESPIRATION RATE: 16 BRPM | DIASTOLIC BLOOD PRESSURE: 61 MMHG | OXYGEN SATURATION: 100 % | SYSTOLIC BLOOD PRESSURE: 114 MMHG | TEMPERATURE: 99 F | WEIGHT: 135 LBS | HEART RATE: 60 BPM | BODY MASS INDEX: 23.92 KG/M2

## 2022-08-24 DIAGNOSIS — N89.8 VAGINAL DISCHARGE: Primary | ICD-10-CM

## 2022-08-24 LAB
B-HCG UR QL: NEGATIVE
CTP QC/QA: YES

## 2022-08-24 PROCEDURE — 87491 CHLMYD TRACH DNA AMP PROBE: CPT | Performed by: NURSE PRACTITIONER

## 2022-08-24 PROCEDURE — 81025 URINE PREGNANCY TEST: CPT | Mod: ER | Performed by: EMERGENCY MEDICINE

## 2022-08-24 PROCEDURE — 87481 CANDIDA DNA AMP PROBE: CPT | Mod: 59 | Performed by: NURSE PRACTITIONER

## 2022-08-24 PROCEDURE — 99284 EMERGENCY DEPT VISIT MOD MDM: CPT | Mod: 25,ER

## 2022-08-24 PROCEDURE — 87591 N.GONORRHOEAE DNA AMP PROB: CPT | Mod: 59 | Performed by: NURSE PRACTITIONER

## 2022-08-24 PROCEDURE — 96372 THER/PROPH/DIAG INJ SC/IM: CPT | Performed by: NURSE PRACTITIONER

## 2022-08-24 PROCEDURE — 25000003 PHARM REV CODE 250: Mod: ER | Performed by: NURSE PRACTITIONER

## 2022-08-24 PROCEDURE — 63600175 PHARM REV CODE 636 W HCPCS: Mod: ER | Performed by: NURSE PRACTITIONER

## 2022-08-24 RX ORDER — CEFTRIAXONE 500 MG/1
500 INJECTION, POWDER, FOR SOLUTION INTRAMUSCULAR; INTRAVENOUS
Status: COMPLETED | OUTPATIENT
Start: 2022-08-24 | End: 2022-08-24

## 2022-08-24 RX ORDER — FLUCONAZOLE 150 MG/1
150 TABLET ORAL
Status: COMPLETED | OUTPATIENT
Start: 2022-08-24 | End: 2022-08-24

## 2022-08-24 RX ORDER — LIDOCAINE HYDROCHLORIDE 10 MG/ML
5 INJECTION INFILTRATION; PERINEURAL
Status: COMPLETED | OUTPATIENT
Start: 2022-08-24 | End: 2022-08-24

## 2022-08-24 RX ORDER — METRONIDAZOLE 500 MG/1
500 TABLET ORAL EVERY 12 HOURS
Qty: 14 TABLET | Refills: 0 | Status: SHIPPED | OUTPATIENT
Start: 2022-08-24 | End: 2022-08-31

## 2022-08-24 RX ORDER — DOXYCYCLINE 100 MG/1
100 CAPSULE ORAL 2 TIMES DAILY
Qty: 14 CAPSULE | Refills: 0 | Status: SHIPPED | OUTPATIENT
Start: 2022-08-24 | End: 2022-08-31

## 2022-08-24 RX ADMIN — LIDOCAINE HYDROCHLORIDE 5 ML: 10 INJECTION, SOLUTION INFILTRATION; PERINEURAL at 11:08

## 2022-08-24 RX ADMIN — CEFTRIAXONE 500 MG: 500 INJECTION, POWDER, FOR SOLUTION INTRAMUSCULAR; INTRAVENOUS at 11:08

## 2022-08-24 RX ADMIN — FLUCONAZOLE 150 MG: 150 TABLET ORAL at 11:08

## 2022-08-24 NOTE — ED TRIAGE NOTES
Ellen Cardenas, a 22 y.o. female presents to the ED via PV with CC of vaginal itching and discharge x2 days. Pt states she had unprotected sex with a new partner and symptoms began after. Pt denies malodor or frequency. Pt states she took an AZO yeast pill yesterday.

## 2022-08-24 NOTE — ED PROVIDER NOTES
Encounter Date: 8/24/2022    SCRIBE #1 NOTE: I, Octavia Drummond, am scribing for, and in the presence of,  Yinka Villela DNP. I have scribed the following portions of the note - Other sections scribed: HPI, ROS, PE.       History     Chief Complaint   Patient presents with    Vaginal Itching     Pt c/o lower ABD pain, vaginal itching and white vaginal d/c for two days. Pt denies dysuria, fever/chills, urinary frequency. Pt reports having unprotected sex two days ago and symptoms started after that.    Vaginal Discharge    Abdominal Pain     22 year old female with history of Constipation and Ectopic pregnancy presents to the ED with complaints of vaginal itching beginning two days after unprotected sex. Pt notes associated symptoms of abdominal pain and vaginal discharge. Denies dysuria, frequency, urgency or back pain. No alleviating or exacerbating factors mentioned. Pt states they took two AZO pills one day ago for symptoms. Additionally, pt notes they called their doctor today and there were no appointments available so they were directed to come to the ED. Allergic to latex and morphine analogues.     The history is provided by the patient. No  was used.     Review of patient's allergies indicates:   Allergen Reactions    Latex, natural rubber Itching    Opioids - morphine analogues Hives     Past Medical History:   Diagnosis Date    Asthma     childhood    Constipation     Depression     Ectopic pregnancy      Past Surgical History:   Procedure Laterality Date    LAPAROSCOPIC SURGICAL REMOVAL OF ECTOPIC PREGNANCY      SALPINGECTOMY Left 12/7/2019    Procedure: SALPINGECTOMY;  Surgeon: Felecia Duran MD;  Location: [facility];  Service: OB/GYN;  Laterality: Left;     Family History   Problem Relation Age of Onset    Breast cancer Neg Hx     Colon cancer Neg Hx     Ovarian cancer Neg Hx      Social History     Tobacco Use    Smoking status: Never Smoker    Smokeless  tobacco: Never Used   Substance Use Topics    Alcohol use: Yes     Comment: OCCASIONAL    Drug use: Yes     Types: Marijuana     Review of Systems   Constitutional: Negative for chills, fatigue and fever.   HENT: Negative for congestion, ear discharge, ear pain, postnasal drip, rhinorrhea, sinus pressure, sneezing, sore throat and voice change.    Eyes: Negative for discharge and itching.   Respiratory: Negative for cough, shortness of breath and wheezing.    Cardiovascular: Negative for chest pain, palpitations and leg swelling.   Gastrointestinal: Positive for abdominal pain. Negative for constipation, diarrhea, nausea and vomiting.   Endocrine: Negative for polydipsia, polyphagia and polyuria.   Genitourinary: Positive for vaginal discharge. Negative for dysuria, frequency, hematuria, urgency, vaginal bleeding and vaginal pain.        (+) Vaginal Itching   Musculoskeletal: Negative for arthralgias and myalgias.   Skin: Negative for rash and wound.   Neurological: Negative for dizziness, seizures, syncope, weakness and numbness.   Hematological: Negative for adenopathy. Does not bruise/bleed easily.   Psychiatric/Behavioral: Negative for self-injury and suicidal ideas. The patient is not nervous/anxious.        Physical Exam     Initial Vitals [08/24/22 1000]   BP Pulse Resp Temp SpO2   114/71 82 16 97.3 °F (36.3 °C) 99 %      MAP       --         Physical Exam    Nursing note and vitals reviewed.  Constitutional: She appears well-developed and well-nourished.   HENT:   Head: Normocephalic and atraumatic.   Right Ear: External ear normal.   Left Ear: External ear normal.   Nose: Nose normal.   Eyes: Conjunctivae and EOM are normal. Pupils are equal, round, and reactive to light. Right eye exhibits no discharge. Left eye exhibits no discharge.   Neck:   Normal range of motion.  Abdominal: Abdomen is soft. Bowel sounds are normal. She exhibits no distension. There is no abdominal tenderness.   No right CVA  tenderness.  No left CVA tenderness.   Musculoskeletal:         General: Normal range of motion.      Cervical back: Normal range of motion.     Neurological: She is alert and oriented to person, place, and time.   Skin: Skin is dry. Capillary refill takes less than 2 seconds.         ED Course   Procedures  Labs Reviewed   C. TRACHOMATIS/N. GONORRHOEAE BY AMP DNA   VAGINOSIS SCREEN BY DNA PROBE   POCT URINE PREGNANCY          Imaging Results    None          Medications   cefTRIAXone injection 500 mg (500 mg Intramuscular Given 8/24/22 1113)   LIDOcaine HCL 10 mg/ml (1%) injection 5 mL (5 mLs Infiltration Given 8/24/22 1113)   fluconazole tablet 150 mg (150 mg Oral Given 8/24/22 1113)     Medical Decision Making:   Initial Assessment:   22 y.o. Female presents to the ER for vaginal itching, vaginal discharge and abdominal pain for 2 days after unprotected sex. On exam, patient has no CVA tenderness and soft non tender abdomen. Vaginosis screen by DNA probe ordered. C.trachomatis/N. gonorrheae AMP DNA test ordered. Patient has been given Ceftriaxone and diflucan in the ER.      Differential Diagnosis:   Differential diagnosis includes but not limited to: Bacterial Vaginosis, Candidiasis, UTI              Scribe Attestation:   Scribe #1: I performed the above scribed service and the documentation accurately describes the services I performed. I attest to the accuracy of the note.        ED Course as of 08/24/22 1243   Wed Aug 24, 2022   1008 BP: 114/71 [VC]   1008 Temp: 97.3 °F (36.3 °C) [VC]   1008 Temp src: Oral [VC]   1008 Pulse: 82 [VC]   1008 Resp: 16 [VC]   1008 SpO2: 99 % [VC]      ED Course User Index  [VC] Yinka Villela DNP           Scribe attestation: I, Yinka Villela DNP ACNP-BC FNP-C ENP-C,  personally performed the services described in this documentation. All medical record entries made by the scribe were at my direction and in my presence.  I have reviewed the chart and agree that the  record reflects my personal performance and is accurate and complete.     See AVS for additional recommendations. Medications listed herein were prescribed after reviewing the patient's allergies, medication list, history, most recent laboratories as available.  Referrals below were provided after reviewing the patient's previous medical providers. She understands she should return for any worsening or changes in condition.  Prior to discharge the patient was asked if she  had any additional concerns or complaints and she declined. The patient was given an opportunity to ask questions and all were answered to her satisfaction.    Clinical Impression:   Final diagnoses:  [N89.8] Vaginal discharge (Primary)          ED Disposition Condition    Discharge Stable        ED Prescriptions     Medication Sig Dispense Start Date End Date Auth. Provider    doxycycline (VIBRAMYCIN) 100 MG Cap Take 1 capsule (100 mg total) by mouth 2 (two) times daily. for 7 days 14 capsule 8/24/2022 8/31/2022 Yinka Villela DNP    metroNIDAZOLE (FLAGYL) 500 MG tablet Take 1 tablet (500 mg total) by mouth every 12 (twelve) hours. for 7 days 14 tablet 8/24/2022 8/31/2022 Yinka Villela DNP        Follow-up Information     Follow up With Specialties Details Why Contact [facility]  Schedule an appointment as soon as possible for a visit  for HIV, Hepatitis, Syphillis testing [address] [facility] [id number]           Yinka Villela DNP  08/24/22 7809

## 2022-08-25 LAB
C TRACH DNA SPEC QL NAA+PROBE: NOT DETECTED
N GONORRHOEA DNA SPEC QL NAA+PROBE: NOT DETECTED

## 2022-08-27 LAB
BACTERIAL VAGINOSIS DNA: NEGATIVE
CANDIDA GLABRATA DNA: NEGATIVE
CANDIDA KRUSEI DNA: NEGATIVE
CANDIDA RRNA VAG QL PROBE: POSITIVE
T VAGINALIS RRNA GENITAL QL PROBE: NEGATIVE

## 2023-06-13 ENCOUNTER — PATIENT MESSAGE (OUTPATIENT)
Dept: RESEARCH | Facility: HOSPITAL | Age: 23
End: 2023-06-13
Payer: MEDICAID

## 2023-11-01 ENCOUNTER — HOSPITAL ENCOUNTER (EMERGENCY)
Facility: HOSPITAL | Age: 23
Discharge: HOME | End: 2023-11-01
Attending: EMERGENCY MEDICINE
Payer: MEDICAID

## 2023-11-01 VITALS
HEIGHT: 63 IN | OXYGEN SATURATION: 99 % | DIASTOLIC BLOOD PRESSURE: 66 MMHG | HEART RATE: 88 BPM | TEMPERATURE: 98 F | RESPIRATION RATE: 20 BRPM | BODY MASS INDEX: 22.15 KG/M2 | WEIGHT: 125 LBS | SYSTOLIC BLOOD PRESSURE: 110 MMHG

## 2023-11-01 DIAGNOSIS — N89.8 VAGINAL DISCHARGE: ICD-10-CM

## 2023-11-01 DIAGNOSIS — Z20.2 POSSIBLE EXPOSURE TO STD: Primary | ICD-10-CM

## 2023-11-01 LAB
B-HCG UR QL: NEGATIVE
BACTERIA #/AREA URNS AUTO: ABNORMAL /HPF
BACTERIA GENITAL QL WET PREP: ABNORMAL
BILIRUB UR QL STRIP: NEGATIVE
CLARITY UR REFRACT.AUTO: ABNORMAL
CLUE CELLS VAG QL WET PREP: ABNORMAL
COLOR UR AUTO: YELLOW
CTP QC/QA: YES
FILAMENT FUNGI VAG WET PREP-#/AREA: ABNORMAL
GLUCOSE UR QL STRIP: NEGATIVE
HGB UR QL STRIP: NEGATIVE
KETONES UR QL STRIP: ABNORMAL
LEUKOCYTE ESTERASE UR QL STRIP: ABNORMAL
MICROSCOPIC COMMENT: ABNORMAL
NITRITE UR QL STRIP: NEGATIVE
PH UR STRIP: 5 [PH] (ref 5–8)
PROT UR QL STRIP: NEGATIVE
RBC #/AREA URNS AUTO: 6 /HPF (ref 0–4)
SP GR UR STRIP: 1.03 (ref 1–1.03)
SPECIMEN SOURCE: ABNORMAL
SQUAMOUS #/AREA URNS AUTO: 3 /HPF
T VAGINALIS GENITAL QL WET PREP: ABNORMAL
URN SPEC COLLECT METH UR: ABNORMAL
WBC #/AREA URNS AUTO: 17 /HPF (ref 0–5)
WBC #/AREA VAG WET PREP: ABNORMAL
YEAST GENITAL QL WET PREP: ABNORMAL

## 2023-11-01 PROCEDURE — 87210 SMEAR WET MOUNT SALINE/INK: CPT

## 2023-11-01 PROCEDURE — 87086 URINE CULTURE/COLONY COUNT: CPT

## 2023-11-01 PROCEDURE — 87186 SC STD MICRODIL/AGAR DIL: CPT

## 2023-11-01 PROCEDURE — 63600175 PHARM REV CODE 636 W HCPCS

## 2023-11-01 PROCEDURE — 87591 N.GONORRHOEAE DNA AMP PROB: CPT

## 2023-11-01 PROCEDURE — 87088 URINE BACTERIA CULTURE: CPT

## 2023-11-01 PROCEDURE — 81001 URINALYSIS AUTO W/SCOPE: CPT

## 2023-11-01 PROCEDURE — 87491 CHLMYD TRACH DNA AMP PROBE: CPT

## 2023-11-01 PROCEDURE — 96372 THER/PROPH/DIAG INJ SC/IM: CPT

## 2023-11-01 PROCEDURE — 81025 URINE PREGNANCY TEST: CPT

## 2023-11-01 PROCEDURE — 99284 EMERGENCY DEPT VISIT MOD MDM: CPT

## 2023-11-01 PROCEDURE — 87077 CULTURE AEROBIC IDENTIFY: CPT

## 2023-11-01 RX ORDER — DOXYCYCLINE 100 MG/1
100 CAPSULE ORAL 2 TIMES DAILY
Qty: 14 CAPSULE | Refills: 0 | Status: SHIPPED | OUTPATIENT
Start: 2023-11-01 | End: 2023-11-01 | Stop reason: SDUPTHER

## 2023-11-01 RX ORDER — CEFTRIAXONE 500 MG/1
500 INJECTION, POWDER, FOR SOLUTION INTRAMUSCULAR; INTRAVENOUS
Status: COMPLETED | OUTPATIENT
Start: 2023-11-01 | End: 2023-11-01

## 2023-11-01 RX ORDER — METRONIDAZOLE 500 MG/1
500 TABLET ORAL EVERY 12 HOURS
Qty: 14 TABLET | Refills: 0 | Status: SHIPPED | OUTPATIENT
Start: 2023-11-01 | End: 2023-11-08

## 2023-11-01 RX ORDER — DOXYCYCLINE 100 MG/1
100 CAPSULE ORAL 2 TIMES DAILY
Qty: 14 CAPSULE | Refills: 0 | Status: SHIPPED | OUTPATIENT
Start: 2023-11-01 | End: 2023-11-08

## 2023-11-01 RX ADMIN — CEFTRIAXONE SODIUM 500 MG: 500 INJECTION, POWDER, FOR SOLUTION INTRAMUSCULAR; INTRAVENOUS at 09:11

## 2023-11-02 LAB
C TRACH DNA SPEC QL NAA+PROBE: NOT DETECTED
N GONORRHOEA DNA SPEC QL NAA+PROBE: DETECTED

## 2023-11-02 NOTE — ED TRIAGE NOTES
"Chief Complaint   Patient presents with    Exposure to STD     States wants a STD screening due to finding out the person she is seeing has been "sleeping around"     APPEARANCE: No acute distress.    NEURO: Awake, alert, appropriate for age  HEENT: Head symmetrical. No obvious deformity  RESPIRATORY: Airway is open and patent. Respirations are spontaneous on room air.   NEUROVASCULAR: All extremities are warm and pink with capillary refill less than 3 seconds.   MUSCULOSKELETAL: Moves all extremities, wiggling toes and moving hands.   SKIN: Warm and dry, adequate turgor, mucus membranes moist and pink    Will continue to monitor.    "

## 2023-11-02 NOTE — ED PROVIDER NOTES
"Encounter Date: 11/1/2023       History     Chief Complaint   Patient presents with    Exposure to STD     States wants a STD screening due to finding out the person she is seeing has been "sleeping around"     23-year-old female with history of asthma and ectopic pregnancy presents to the ED regarding concern for STI exposure.  Patient has been having sexual intercourse with 1 partner who maybe having multiple sexual partners.  States "she heard through the grape vine" and wants to get checked out. Complains of mild vaginal discharge with no other complaints. She did states she was sick recently and had finished a course of antibiotics.    The history is provided by the patient and medical records.     Review of patient's allergies indicates:   Allergen Reactions    Latex, natural rubber Itching    Opioids - morphine analogues Hives     Past Medical History:   Diagnosis Date    Asthma     childhood    Constipation     Depression     Ectopic pregnancy      Past Surgical History:   Procedure Laterality Date    LAPAROSCOPIC SURGICAL REMOVAL OF ECTOPIC PREGNANCY      SALPINGECTOMY Left 12/7/2019    Procedure: SALPINGECTOMY;  Surgeon: Felecia Duran MD;  Location: [facility];  Service: OB/GYN;  Laterality: Left;     Family History   Problem Relation Age of Onset    Breast cancer Neg Hx     Colon cancer Neg Hx     Ovarian cancer Neg Hx      Social History     Tobacco Use    Smoking status: Never    Smokeless tobacco: Never   Substance Use Topics    Alcohol use: Yes     Comment: OCCASIONAL    Drug use: Yes     Types: Marijuana     Review of Systems   Constitutional:  Negative for fever.   HENT:  Negative for sore throat.    Respiratory:  Negative for shortness of breath.    Cardiovascular:  Negative for chest pain.   Gastrointestinal:  Negative for nausea.   Genitourinary:  Positive for vaginal discharge. Negative for dysuria.   Musculoskeletal:  Negative for back pain.   Skin:  Negative for rash.   Neurological:  Negative " for weakness.   Hematological:  Does not bruise/bleed easily.       Physical Exam     Initial Vitals [11/01/23 2037]   BP Pulse Resp Temp SpO2   (!) 109/56 99 14 98.7 °F (37.1 °C) 97 %      MAP       --         Physical Exam    Vitals reviewed.  Constitutional: She appears well-developed and well-nourished. She is not diaphoretic. No distress.   HENT:   Head: Normocephalic and atraumatic.   Neck: Neck supple.   Cardiovascular:  Normal rate.           Pulmonary/Chest: No respiratory distress.   Genitourinary: There is no rash, tenderness or lesion on the right labia. There is no rash, tenderness or lesion on the left labia. Cervix exhibits no motion tenderness. Right adnexum displays no mass and no tenderness. Left adnexum displays no mass and no tenderness.    Vaginal discharge present.      No vaginal erythema or bleeding.   No erythema or bleeding in the vagina.    Genitourinary Comments: Chaperone present throughout exam  White, thick vaginal discharge present     Musculoskeletal:      Cervical back: Neck supple.     Neurological: She is alert and oriented to person, place, and time.   Psychiatric: She has a normal mood and affect.         ED Course   Procedures  Labs Reviewed   VAGINAL SCREEN - Abnormal; Notable for the following components:       Result Value    Clue Cells Few (*)     WBC - Vaginal Screen Rare (*)     Bacteria - Vaginal Screen Many (*)     All other components within normal limits    Narrative:     Release to patient->Immediate   URINALYSIS, REFLEX TO URINE CULTURE - Abnormal; Notable for the following components:    Appearance, UA Hazy (*)     Ketones, UA 1+ (*)     Leukocytes, UA 1+ (*)     All other components within normal limits    Narrative:     Specimen Source->Urine   URINALYSIS MICROSCOPIC - Abnormal; Notable for the following components:    RBC, UA 6 (*)     WBC, UA 17 (*)     Bacteria Few (*)     All other components within normal limits    Narrative:     Specimen Source->Urine   C.  TRACHOMATIS/N. GONORRHOEAE BY AMP DNA   CULTURE, URINE   HIV 1 / 2 ANTIBODY   HEPATITIS C ANTIBODY   POCT URINE PREGNANCY          Imaging Results    None          Medications   cefTRIAXone injection 500 mg (500 mg Intramuscular Given 11/1/23 2132)     Medical Decision Making  Amount and/or Complexity of Data Reviewed  Labs: ordered. Decision-making details documented in ED Course.    Risk  Prescription drug management.         APC / Resident Notes:   23-year-old female with history of asthma and ectopic pregnancy presents to the ED regarding concern for STI exposure. Complains of vaginal discharge. Vitals WNL. Clinically well appearing, in no acute distress. Mild white, thick vaginal discharge. No CMT, not concerned for PID. No adnexal tenderness, not concerned for ovarian cyst or torsion. Will check do vaginal screen and GC. Patient would like to be prophylactically treated, will give ceftriaxone and Rx doxycycline.    My differential diagnoses include but are not limited to:   STI, PID, BV, yeast candidiasis, ovarian cyst, ovarian torsion    See ED course. Still pending UA and vaginal screen, patient will check MyChart. Advised to follow up with PCP and strict ED return precautions given with all questions answered.  Patient verbalized understanding and agreed to plan. Vitals are stable and safe for discharge.   I have reviewed the patient's records and discussed with my supervising physician.    23:33 UA shows few bacteria. Patient has no complaints of dysuria, will wait for culture. Vaginal screen showed clue cells, prescribed metronidazole. Called patient to update and educated to not drink alcohol while taking this medication. Patient verbalized understanding. Asked to switch meds to [facility] on [address] [facility].       Attending Attestation:     Physician Attestation Statement for NP/PA:   I have directed and reviewed the workup performed by the PA/NP.  I performed the substantive portion of the medical  decision making.     Other NP/PA Attestation Additions:      Medical Decision Makin yo non-pregnant female presenting for STI screen. Reports having some discharge, but no pelvic or abdominal pain, no fever.  No dysuria or flank pain. On exam, afebrile, well appearing, non-tender abdomen.  Empiric tx for STI.  Abstain from sexual activity until culture results/test of cure if applicable. Return if pain, fever, any new or concerning sx.              ED Course as of 23 Preg Test, Ur: Negative [KB]    Preg Test, Ur: Negative [AB]      ED Course User Index  [AB] Jean-Paul Mejia MD  [KB] Sujatha Lal PA-C                    Clinical Impression:   Final diagnoses:  [N89.8] Vaginal discharge  [Z20.2] Possible exposure to STD (Primary)        ED Disposition Condition    Discharge Stable          ED Prescriptions       Medication Sig Dispense Start Date End Date Auth. Provider    doxycycline (VIBRAMYCIN) 100 MG Cap  (Status: Discontinued) Take 1 capsule (100 mg total) by mouth 2 (two) times daily. for 7 days 14 capsule 2023 Sujatha Lal PA-C    metroNIDAZOLE (FLAGYL) 500 MG tablet Take 1 tablet (500 mg total) by mouth every 12 (twelve) hours. for 7 days 14 tablet 2023 Sujatha Lal PA-C    doxycycline (VIBRAMYCIN) 100 MG Cap Take 1 capsule (100 mg total) by mouth 2 (two) times daily. for 7 days 14 capsule 2023 Sujatha Lal PA-C          Follow-up Information       Follow up With Specialties Details Why Contact Info    Main King MD General Practice Schedule an appointment as soon as possible for a visit   [address] [address] [phone]      [facility] - Emergency Dept Emergency Medicine Go to  If symptoms worsen [address]  [facility] [address] [phone]             Sujatha Lal PA-C  [phone]       Jean-Paul Mejia MD  23 1224

## 2023-11-03 LAB — BACTERIA UR CULT: ABNORMAL

## 2023-11-04 ENCOUNTER — TELEPHONE (OUTPATIENT)
Dept: EMERGENCY MEDICINE | Facility: HOSPITAL | Age: 23
End: 2023-11-04
Payer: MEDICAID

## 2023-11-04 RX ORDER — CEPHALEXIN 500 MG/1
500 CAPSULE ORAL 4 TIMES DAILY
Qty: 20 CAPSULE | Refills: 0 | Status: SHIPPED | OUTPATIENT
Start: 2023-11-04 | End: 2023-11-09

## 2023-11-04 NOTE — TELEPHONE ENCOUNTER
Educated on urine culture and GC results. Discontinue doxy and switch to Keflex. Negative for chlamydia but positive for gonorrhea.  Advised to return to the ED for injection of Rocephin.  Patient verbalized understanding and agreed to plan.

## 2023-11-06 ENCOUNTER — HOSPITAL ENCOUNTER (EMERGENCY)
Facility: HOSPITAL | Age: 23
Discharge: HOME | End: 2023-11-06
Attending: EMERGENCY MEDICINE
Payer: MEDICAID

## 2023-11-06 VITALS
TEMPERATURE: 98 F | HEART RATE: 80 BPM | OXYGEN SATURATION: 98 % | RESPIRATION RATE: 16 BRPM | DIASTOLIC BLOOD PRESSURE: 62 MMHG | SYSTOLIC BLOOD PRESSURE: 124 MMHG

## 2023-11-06 DIAGNOSIS — A54.9 GONORRHEA: Primary | ICD-10-CM

## 2023-11-06 PROCEDURE — 99281 EMR DPT VST MAYX REQ PHY/QHP: CPT

## 2023-11-06 RX ORDER — CEFTRIAXONE 500 MG/1
500 INJECTION, POWDER, FOR SOLUTION INTRAMUSCULAR; INTRAVENOUS
Status: DISCONTINUED | OUTPATIENT
Start: 2023-11-06 | End: 2023-11-06

## 2023-11-07 NOTE — FIRST PROVIDER EVALUATION
Medical screening examination initiated.  I have conducted a focused provider triage encounter, findings are as follows:    Brief history of present illness:  24 yo F presents for rocephin injection d/t positive Gonorrhea test. Doxycycline prescription noted. Keflex prescription noted. Flagyl prescription noted. Administration of ceftriaxone 500mg IM on 11/1/23 noted.    Vitals:    11/06/23 1955   BP: 128/61   Pulse: 78   Resp: 17   Temp: 98.2 °F (36.8 °C)   TempSrc: Oral   SpO2: 98%       Pertinent physical exam:  nontoxic, well appearing    Brief workup plan:  re-evaluate to ensure complete treatment given    Preliminary workup initiated; this workup will be continued and followed by the physician or advanced practice provider that is assigned to the patient when roomed.

## 2023-11-07 NOTE — ED PROVIDER NOTES
Encounter Date: 11/6/2023       History     Chief Complaint   Patient presents with    Exposure to STD     Reports she was called and told to come back for antibiotics d/t testing + for gonorrhea. Denies symptoms      23-year-old female with history of asthma and ectopic pregnancy presents to the ED for Rocephin injection after positive gonorrhea test.  She was treated for chlamydia as well with doxycycline, Keflex for UTI, and Flagyl for BV.  Denies any complaints at this time.    The history is provided by the patient and medical records.     Review of patient's allergies indicates:   Allergen Reactions    Latex, natural rubber Itching    Opioids - morphine analogues Hives     Past Medical History:   Diagnosis Date    Asthma     childhood    Constipation     Depression     Ectopic pregnancy      Past Surgical History:   Procedure Laterality Date    LAPAROSCOPIC SURGICAL REMOVAL OF ECTOPIC PREGNANCY      SALPINGECTOMY Left 12/7/2019    Procedure: SALPINGECTOMY;  Surgeon: Felecia Duran MD;  Location: [facility];  Service: OB/GYN;  Laterality: Left;     Family History   Problem Relation Age of Onset    Breast cancer Neg Hx     Colon cancer Neg Hx     Ovarian cancer Neg Hx      Social History     Tobacco Use    Smoking status: Never    Smokeless tobacco: Never   Substance Use Topics    Alcohol use: Yes     Comment: OCCASIONAL    Drug use: Yes     Types: Marijuana     Review of Systems   Constitutional:  Negative for fever.   HENT:  Negative for sore throat.    Respiratory:  Negative for shortness of breath.    Cardiovascular:  Negative for chest pain.   Gastrointestinal:  Negative for nausea.   Genitourinary:  Negative for dysuria.   Musculoskeletal:  Negative for back pain.   Skin:  Negative for rash.   Neurological:  Negative for weakness.   Hematological:  Does not bruise/bleed easily.       Physical Exam     Initial Vitals [11/06/23 1955]   BP Pulse Resp Temp SpO2   128/61 78 17 98.2 °F (36.8 °C) 98 %      MAP        --         Physical Exam    Vitals reviewed.  Constitutional: Vital signs are normal. She appears well-developed and well-nourished. She is not diaphoretic. No distress.   HENT:   Head: Normocephalic and atraumatic.   Cardiovascular:  Normal rate.           Pulmonary/Chest: No respiratory distress.     Neurological: She is alert and oriented to person, place, and time. GCS score is 15. GCS eye subscore is 4. GCS verbal subscore is 5. GCS motor subscore is 6.   Psychiatric: She has a normal mood and affect.         ED Course   Procedures  Labs Reviewed - No data to display       Imaging Results    None          Medications - No data to display  Medical Decision Making       APC / Resident Notes:   23-year-old female with history of asthma and ectopic pregnancy presents to the ED for Rocephin injection after positive gonorrhea test. VSS.  Clinically well-appearing. Reviewed patient's chart and she had received Rocephin empirically 11/1/23. Patient mistakenly called to return to ED when she had already been treated for Gonorrhea. Strict ED return precautions given.                        Clinical Impression:   Final diagnoses:  [A54.9] Gonorrhea (Primary)        ED Disposition Condition    Discharge Stable          ED Prescriptions    None       Follow-up Information    None          Sujatha Lal PA-C  11/06/23 7769

## 2023-11-08 ENCOUNTER — PATIENT OUTREACH (OUTPATIENT)
Dept: EMERGENCY MEDICINE | Facility: HOSPITAL | Age: 23
End: 2023-11-08
Payer: MEDICAID

## 2023-11-08 NOTE — PROGRESS NOTES
Preston Andersen LPN  ED Navigator  Emergency Department    [place] [facility] [place]  Role: Community Health Worker    Date: 11/08/2023  Patient Name: Ellen Cardenas  MRN: [id number]  PCP: Main King MD    Assessment:     lElen Cardenas is a 23 y.o. female who has presented to ED for Exposure to STD. Patient has visited the ED 2 times in the past 3 months. Patient did not contact PCP.     ED Navigator Initial Assessment    ED Navigator Enrollment Documentation  Consent to Services  Does patient consent to completing the assessment?: Yes  Contact  Method of Initial Contact: Phone  Transportation  Does the patient have issues with Transportation?: Yes  Does the patient have transportation to and from healthcare appointments?: Yes  Can family, friends, or others help?: Yes  Lack of transportation to appts, pharmacy, etc.?: No  What type of assistance is needed?: Standard (RTA/MITS)  What is available in their region?: uber  Insurance Coverage  Do you have coverage/adequate coverage?: Yes  Type/kind of coverage: Medicaid/[employer] [facility]  Is patient able to afford co-pays/deductibles?: Yes  Does patient have an outstanding balance bill at [facility]?: No  Is patient able to afford HME or supplies?: Yes  Does patient have an established [facility] PCP?: Yes  Able to access?: Yes  Does the patient have a lack of adequate coverage?: No  Specialist Appointment  Did the patient come to the ED to see a specialist?: No  Does the patient have a pending specialist referral?: No  Does the patient have a specialist appointment made?: No  PCP Follow Up Appointment  Has the patient had an appointment with a primary care provider in the past year?: Yes  Approximate date: 11/8/22  Provider: Main King MD  Does the patient have a follow up appontment with a PCP?: No  When was the last time you saw your PCP?: 11/8/22  Why does the patient not have a follow up scheduled?: Other (see comments) (Comment: Pt will schedule as  needed and is looking for a new PCP.)  Medications  Is patient able to afford medication?: Yes  Is patient unable to get medication due to lack of transportation?: No  Psychological  Does the patient have psycho-social concerns?: Yes  What concerns does the patient have?: Anxiety and/or Depression, Other (see comments) (Comment: stress)  Food  Does the patient have concerns about food?: Yes  What concerns does the patient have regarding food?: Lack of food  Communication/Education  Does the patient have limited English proficiency/English not primary language?: No  Does patient have low literacy and/or low health literacy?: No  Does patient have concerns with care?: No  Does patient have dissatisfaction with care?: No  Other Financial Concerns  Does the patient have immediate financial distress?: Yes  Does the patient have general financial concerns?: Yes  Other Social Barriers/Concerns  Does the patient have any additional barriers or concerns?: Unable to afford utilities  Primary Barrier  Barriers identified: Financial barrier (health insurance, employment, etc.), Structural barrier (service availability, waiting times, etc.)  Root Cause of ED Utilization: Chronic Conditions  Plan to address Chronic Conditions: Schedule appointment for patient with their PCP/specialist per ED discharge instructions, Encourage patient to contact PCP/specialist for follow up per ED discharge instructions, Remind patient of upcoming PCP/specialist appointment within 24 to 48 hours before scheduled appt  Next steps: Provided Education, Other Service  Was education/educational materials provided surrounding PCP services/creating a medical home?: Yes Was education verbal or written?: Written     Was education/educational materials provided surrounding low cost, healthy foods?: Yes Was education verbal or written?: Written     Was education/educational materials provided surrounding other items? If so, use comment to explain.: Yes Was  education verbal or written?: Written   Plan: Provided information for [facility] 24/7 Nurse triage line, [phone] or 1-866-[facility] ([phone])  Expected Date of Follow Up 1: 12/6/23         Social History     Socioeconomic History    Marital status: Single   Tobacco Use    Smoking status: Never    Smokeless tobacco: Never   Substance and Sexual Activity    Alcohol use: Yes     Comment: OCCASIONAL    Drug use: Yes     Types: Marijuana    Sexual activity: Yes     Partners: Male     Social Determinants of Health     Financial Resource Strain: Medium Risk (11/8/2023)    Overall Financial Resource Strain (CARDIA)     Difficulty of Paying Living Expenses: Somewhat hard   Food Insecurity: Food Insecurity Present (11/8/2023)    Hunger Vital Sign     Worried About Running Out of Food in the Last Year: Sometimes true   Transportation Needs: Unknown (11/8/2023)    PRAPARE - Transportation     Lack of Transportation (Medical): No   Physical Activity: Sufficiently Active (11/8/2023)    Exercise Vital Sign     Days of Exercise per Week: 7 days     Minutes of Exercise per Session: 30 min   Stress: Stress Concern Present (11/8/2023)    [employer] - Occupational Stress Questionnaire     Feeling of Stress : To some extent   Social Connections: Socially Isolated (11/8/2023)    Social Connection and Isolation Panel [NHANES]     Frequency of Communication with Friends and Family: More than three times a week     Frequency of Social Gatherings with Friends and Family: More than three times a week     Attends Muslim Services: Never     Active Member of Clubs or Organizations: No     Attends Club or Organization Meetings: Never     Marital Status: Never    Housing Stability: High Risk (11/8/2023)    Housing Stability Vital Sign     Unable to Pay for Housing in the Last Year: Yes     Unstable Housing in the Last Year: No       Plan:   ED Navigator called to follow up with Pt in regards to  her recent ER visit for an exposure to STD. Pt states that everything is going ok and she is scheduled for a wellness visit on 11-9-23 and requests information on utility assistance, extra food,rent and housing assistance. Patient was given education on (The Right Care at the Right Level information, [facility] information, and Heart healthy diet tips), education on Rent/Mortgage payment assistance for their region. Patient was given utility assistance resources for their area, on Stress and anxiety relief. Patient was given food bank/pantry resources for their region. Resources sent via email to [email or web address].   Preston Andersen      Food Insecurity: resources sent  Transportation Insecurity: resources sent    Appointment made with: Main King MD

## 2023-11-09 ENCOUNTER — OFFICE VISIT (OUTPATIENT)
Dept: OBSTETRICS AND GYNECOLOGY | Facility: CLINIC | Age: 23
End: 2023-11-09
Attending: OBSTETRICS & GYNECOLOGY
Payer: MEDICAID

## 2023-11-09 VITALS
SYSTOLIC BLOOD PRESSURE: 118 MMHG | DIASTOLIC BLOOD PRESSURE: 70 MMHG | HEIGHT: 63 IN | WEIGHT: 119 LBS | BODY MASS INDEX: 21.09 KG/M2

## 2023-11-09 DIAGNOSIS — N76.0 ACUTE VAGINITIS: ICD-10-CM

## 2023-11-09 DIAGNOSIS — Z01.419 WELL WOMAN EXAM WITH ROUTINE GYNECOLOGICAL EXAM: Primary | ICD-10-CM

## 2023-11-09 DIAGNOSIS — A54.9 GONORRHEA: ICD-10-CM

## 2023-11-09 DIAGNOSIS — K59.00 CONSTIPATION, UNSPECIFIED CONSTIPATION TYPE: ICD-10-CM

## 2023-11-09 DIAGNOSIS — Z30.09 ENCOUNTER FOR COUNSELING REGARDING CONTRACEPTION: ICD-10-CM

## 2023-11-09 PROBLEM — R07.9 CHEST PAIN: Status: RESOLVED | Noted: 2021-10-15 | Resolved: 2023-11-09

## 2023-11-09 PROBLEM — G43.919 REFRACTORY MIGRAINE WITH AURA: Status: ACTIVE | Noted: 2023-11-09

## 2023-11-09 PROBLEM — N64.89 PENDULOUS BREAST: Status: RESOLVED | Noted: 2021-10-15 | Resolved: 2023-11-09

## 2023-11-09 PROBLEM — O00.90 ECTOPIC PREGNANCY WITHOUT INTRAUTERINE PREGNANCY: Status: RESOLVED | Noted: 2019-12-07 | Resolved: 2023-11-09

## 2023-11-09 PROBLEM — L29.9 ITCH: Status: RESOLVED | Noted: 2021-10-15 | Resolved: 2023-11-09

## 2023-11-09 PROBLEM — Z20.2 EXPOSURE TO SEXUALLY TRANSMITTED DISEASE (STD): Status: RESOLVED | Noted: 2021-10-15 | Resolved: 2023-11-09

## 2023-11-09 PROBLEM — Z20.822 EXPOSURE TO COVID-19 VIRUS: Status: RESOLVED | Noted: 2021-10-15 | Resolved: 2023-11-09

## 2023-11-09 PROBLEM — Z87.59 HISTORY OF ECTOPIC PREGNANCY: Status: RESOLVED | Noted: 2021-08-06 | Resolved: 2023-11-09

## 2023-11-09 PROBLEM — A56.2 CHLAMYDIAL INFECTION OF GENITOURINARY TRACT: Status: RESOLVED | Noted: 2021-07-13 | Resolved: 2023-11-09

## 2023-11-09 PROCEDURE — 99385 PR PREVENTIVE VISIT,NEW,18-39: ICD-10-PCS | Mod: S$PBB,,, | Performed by: OBSTETRICS & GYNECOLOGY

## 2023-11-09 PROCEDURE — 99385 PREV VISIT NEW AGE 18-39: CPT | Mod: S$PBB,,, | Performed by: OBSTETRICS & GYNECOLOGY

## 2023-11-09 PROCEDURE — 99999 PR PBB SHADOW E&M-EST. PATIENT-LVL III: CPT | Mod: PBBFAC,,, | Performed by: OBSTETRICS & GYNECOLOGY

## 2023-11-09 PROCEDURE — 3008F PR BODY MASS INDEX (BMI) DOCUMENTED: ICD-10-PCS | Mod: CPTII,,, | Performed by: OBSTETRICS & GYNECOLOGY

## 2023-11-09 PROCEDURE — 3078F DIAST BP <80 MM HG: CPT | Mod: CPTII,,, | Performed by: OBSTETRICS & GYNECOLOGY

## 2023-11-09 PROCEDURE — 1159F PR MEDICATION LIST DOCUMENTED IN MEDICAL RECORD: ICD-10-PCS | Mod: CPTII,,, | Performed by: OBSTETRICS & GYNECOLOGY

## 2023-11-09 PROCEDURE — 88175 CYTOPATH C/V AUTO FLUID REDO: CPT | Performed by: OBSTETRICS & GYNECOLOGY

## 2023-11-09 PROCEDURE — 1159F MED LIST DOCD IN RCRD: CPT | Mod: CPTII,,, | Performed by: OBSTETRICS & GYNECOLOGY

## 2023-11-09 PROCEDURE — 99213 OFFICE O/P EST LOW 20 MIN: CPT | Mod: PBBFAC,PN | Performed by: OBSTETRICS & GYNECOLOGY

## 2023-11-09 PROCEDURE — 3078F PR MOST RECENT DIASTOLIC BLOOD PRESSURE < 80 MM HG: ICD-10-PCS | Mod: CPTII,,, | Performed by: OBSTETRICS & GYNECOLOGY

## 2023-11-09 PROCEDURE — 3008F BODY MASS INDEX DOCD: CPT | Mod: CPTII,,, | Performed by: OBSTETRICS & GYNECOLOGY

## 2023-11-09 PROCEDURE — 81514 NFCT DS BV&VAGINITIS DNA ALG: CPT | Performed by: OBSTETRICS & GYNECOLOGY

## 2023-11-09 PROCEDURE — 3074F PR MOST RECENT SYSTOLIC BLOOD PRESSURE < 130 MM HG: ICD-10-PCS | Mod: CPTII,,, | Performed by: OBSTETRICS & GYNECOLOGY

## 2023-11-09 PROCEDURE — 99999 PR PBB SHADOW E&M-EST. PATIENT-LVL III: ICD-10-PCS | Mod: PBBFAC,,, | Performed by: OBSTETRICS & GYNECOLOGY

## 2023-11-09 PROCEDURE — 3074F SYST BP LT 130 MM HG: CPT | Mod: CPTII,,, | Performed by: OBSTETRICS & GYNECOLOGY

## 2023-11-09 PROCEDURE — 1160F RVW MEDS BY RX/DR IN RCRD: CPT | Mod: CPTII,,, | Performed by: OBSTETRICS & GYNECOLOGY

## 2023-11-09 PROCEDURE — 1160F PR REVIEW ALL MEDS BY PRESCRIBER/CLIN PHARMACIST DOCUMENTED: ICD-10-PCS | Mod: CPTII,,, | Performed by: OBSTETRICS & GYNECOLOGY

## 2023-11-09 RX ORDER — NORGESTIMATE AND ETHINYL ESTRADIOL 0.25-0.035
1 KIT ORAL
COMMUNITY
Start: 2023-03-30 | End: 2023-11-09

## 2023-11-09 RX ORDER — FLUOXETINE 10 MG/1
1 TABLET ORAL
COMMUNITY
Start: 2022-04-05

## 2023-11-09 RX ORDER — HYDROXYZINE PAMOATE 25 MG/1
25 CAPSULE ORAL 3 TIMES DAILY PRN
COMMUNITY

## 2023-11-09 RX ORDER — LEVONORGESTREL 1.5 MG/1
1.5 TABLET ORAL ONCE
Qty: 1 TABLET | Refills: 0 | Status: SHIPPED | OUTPATIENT
Start: 2023-11-09 | End: 2023-11-09

## 2023-11-09 NOTE — PROGRESS NOTES
SUBJECTIVE:   23 y.o. female   for annual routine Pap and checkup. Patient's last menstrual period was 10/23/2023..  She complains of vaginal discharge and itching since being treated for gonorrhea and a UTI a week ago.  She is not sexually active currently and is not on birth control.  Her cycles are monthly and last 6 days.  She has had gardasil.       Past Medical History:   Diagnosis Date    Asthma     childhood    Constipation     Depression     Ectopic pregnancy      Past Surgical History:   Procedure Laterality Date    LAPAROSCOPIC SURGICAL REMOVAL OF ECTOPIC PREGNANCY      SALPINGECTOMY Left 2019    Procedure: SALPINGECTOMY;  Surgeon: Felecia Duran MD;  Location: [facility];  Service: OB/GYN;  Laterality: Left;     Social History     Socioeconomic History    Marital status: Single   Tobacco Use    Smoking status: Never    Smokeless tobacco: Never   Substance and Sexual Activity    Alcohol use: Yes     Comment: OCCASIONAL    Drug use: Yes     Types: Marijuana    Sexual activity: Yes     Partners: Male     Social Determinants of Health     Financial Resource Strain: Medium Risk (2023)    Overall Financial Resource Strain (CARDIA)     Difficulty of Paying Living Expenses: Somewhat hard   Food Insecurity: Food Insecurity Present (2023)    Hunger Vital Sign     Worried About Running Out of Food in the Last Year: Sometimes true   Transportation Needs: Unknown (2023)    PRAPARE - Transportation     Lack of Transportation (Medical): No   Physical Activity: Sufficiently Active (2023)    Exercise Vital Sign     Days of Exercise per Week: 7 days     Minutes of Exercise per Session: 30 min   Stress: Stress Concern Present (2023)    [employer] - Occupational Stress Questionnaire     Feeling of Stress : To some extent   Social Connections: Socially Isolated (2023)    Social Connection and Isolation Panel [NHANES]     Frequency of Communication with  Friends and Family: More than three times a week     Frequency of Social Gatherings with Friends and Family: More than three times a week     Attends Protestant Services: Never     Active Member of Clubs or Organizations: No     Attends Club or Organization Meetings: Never     Marital Status: Never    Housing Stability: High Risk (2023)    Housing Stability Vital Sign     Unable to Pay for Housing in the Last Year: Yes     Unstable Housing in the Last Year: No     Family History   Problem Relation Age of Onset    Breast cancer Neg Hx     Colon cancer Neg Hx     Ovarian cancer Neg Hx      OB History    Para Term  AB Living   2       2 0   SAB IAB Ectopic Multiple Live Births       2          # Outcome Date GA Lbr Callum/2nd Weight Sex Delivery Anes PTL Lv   2 Ectopic            1 Ectopic                  Current Outpatient Medications   Medication Sig Dispense Refill    cephALEXin (KEFLEX) 500 MG capsule Take 1 capsule (500 mg total) by mouth 4 (four) times daily. for 5 days 20 capsule 0    FLUoxetine 10 MG Tab 1 tablet.      hydrOXYzine pamoate (VISTARIL) 25 MG Cap Take 25 mg by mouth 3 (three) times daily as needed.      ondansetron (ZOFRAN) 4 MG tablet Take 8 mg by mouth 2 (two) times daily.      triamcinolone acetonide 0.1% (KENALOG) 0.1 % ointment Apply 1 application topically 2 (two) times daily.      triamcinolone acetonide 0.1% (KENALOG) 0.1 % ointment Apply topically 2 (two) times daily.      levonorgestreL (PLAN B) 1.5 mg Tab tablet Take 1 tablet (1.5 mg total) by mouth once. for 1 dose 1 tablet 0     No current facility-administered medications for this visit.     Allergies: Latex, natural rubber and Opioids - morphine analogues     ROS:  Constitutional: no weight loss, weight gain, fever, fatigue  Eyes:  No vision changes, glasses/contacts  ENT/Mouth: No ulcers, sinus problems, ears ringing, headache  Cardiovascular: No inability to lie flat, chest pain, exercise intolerance,  "swelling, heart palpitations  Respiratory: No wheezing, coughing blood, shortness of breath, or cough  Gastrointestinal: No diarrhea, bloody stool, nausea/vomiting, constipation, gas, hemorrhoids  Genitourinary: No blood in urine, painful urination, urgency of urination, frequency of urination, incomplete emptying, incontinence, abnormal bleeding, painful periods, heavy periods, vaginal discharge, vaginal odor, painful intercourse, sexual problems, bleeding after intercourse.  Musculoskeletal: No muscle weakness  Skin/Breast: No painful breasts, nipple discharge, masses, rash, ulcers  Neurological: No passing out, seizures, numbness, headache  Endocrine: No diabetes, hypothyroid, hyperthyroid, hot flashes, hair loss, abnormal hair growth, ance  Psychiatric: No depression, crying  Hematologic: No bruises, bleeding, swollen lymph nodes, anemia.      OBJECTIVE:   The patient appears well, alert, oriented x 3, in no distress.  /70   Ht 5' 3" (1.6 m)   Wt 54 kg (119 lb)   LMP 10/23/2023   BMI 21.08 kg/m²   NECK: no thyromegaly, trachea midline  SKIN: no acne, striae, hirsutism  BREAST EXAM: breasts appear normal, no suspicious masses, no skin or nipple changes or axillary nodes  ABDOMEN: no hernias, masses, or hepatosplenomegaly  GENITALIA: normal external genitalia, no erythema, no discharge  URETHRA: normal urethra, normal urethral meatus  VAGINA: vaginal discharge scant and white  CERVIX: no lesions or cervical motion tenderness  UTERUS: normal size, contour, position, consistency, mobility, non-tender  ADNEXA: normal adnexa and no mass, fullness, tenderness    \  ASSESSMENT:   Driss was seen today for well woman and std check.    Diagnoses and all orders for this visit:    Well woman exam with routine gynecological exam  -     Liquid-Based Pap Smear, Screening  -     Cancel: C. trachomatis/N. gonorrhoeae by AMP DNA    Gonorrhea  -     Cancel: C. trachomatis/N. gonorrhoeae by AMP DNA  -     HIV 1/2 Ag/Ab " (4th Gen); Future  -     RPR; Future  -     HEPATITIS C ANTIBODY; Future    Acute vaginitis  -     Vaginosis Screen by DNA Probe    Constipation, unspecified constipation type  -     TSH; Future    Encounter for counseling regarding contraception  -     levonorgestreL (PLAN B) 1.5 mg Tab tablet; Take 1 tablet (1.5 mg total) by mouth once. for 1 dose      RTC 2 months for repeat cultures.  It is too early to repeat today.

## 2023-11-10 ENCOUNTER — PATIENT MESSAGE (OUTPATIENT)
Dept: OBSTETRICS AND GYNECOLOGY | Facility: CLINIC | Age: 23
End: 2023-11-10
Payer: MEDICAID

## 2023-11-10 LAB
BACTERIAL VAGINOSIS DNA: POSITIVE
CANDIDA GLABRATA DNA: NEGATIVE
CANDIDA KRUSEI DNA: NEGATIVE
CANDIDA RRNA VAG QL PROBE: POSITIVE
T VAGINALIS RRNA GENITAL QL PROBE: NEGATIVE

## 2023-11-10 RX ORDER — METRONIDAZOLE 500 MG/1
500 TABLET ORAL EVERY 12 HOURS
Qty: 14 TABLET | Refills: 0 | Status: SHIPPED | OUTPATIENT
Start: 2023-11-10 | End: 2023-11-17

## 2023-11-10 RX ORDER — FLUCONAZOLE 150 MG/1
150 TABLET ORAL DAILY
Qty: 1 TABLET | Refills: 1 | Status: SHIPPED | OUTPATIENT
Start: 2023-11-10 | End: 2023-11-11

## 2023-11-13 ENCOUNTER — HOSPITAL ENCOUNTER (EMERGENCY)
Facility: HOSPITAL | Age: 23
Discharge: HOME | End: 2023-11-13
Attending: STUDENT IN AN ORGANIZED HEALTH CARE EDUCATION/TRAINING PROGRAM
Payer: MEDICAID

## 2023-11-13 VITALS
SYSTOLIC BLOOD PRESSURE: 124 MMHG | OXYGEN SATURATION: 98 % | BODY MASS INDEX: 21.08 KG/M2 | WEIGHT: 119 LBS | HEART RATE: 70 BPM | DIASTOLIC BLOOD PRESSURE: 62 MMHG | TEMPERATURE: 98 F | RESPIRATION RATE: 16 BRPM

## 2023-11-13 DIAGNOSIS — S60.222A CONTUSION OF LEFT HAND, INITIAL ENCOUNTER: Primary | ICD-10-CM

## 2023-11-13 LAB
B-HCG UR QL: NEGATIVE
CTP QC/QA: YES

## 2023-11-13 PROCEDURE — 96372 THER/PROPH/DIAG INJ SC/IM: CPT | Performed by: PHYSICIAN ASSISTANT

## 2023-11-13 PROCEDURE — 63600175 PHARM REV CODE 636 W HCPCS: Performed by: PHYSICIAN ASSISTANT

## 2023-11-13 PROCEDURE — 81025 URINE PREGNANCY TEST: CPT | Performed by: PHYSICIAN ASSISTANT

## 2023-11-13 PROCEDURE — 29125 APPL SHORT ARM SPLINT STATIC: CPT | Mod: LT

## 2023-11-13 PROCEDURE — 25000003 PHARM REV CODE 250: Performed by: STUDENT IN AN ORGANIZED HEALTH CARE EDUCATION/TRAINING PROGRAM

## 2023-11-13 PROCEDURE — 99284 EMERGENCY DEPT VISIT MOD MDM: CPT

## 2023-11-13 RX ORDER — ACETAMINOPHEN 325 MG/1
650 TABLET ORAL
Status: COMPLETED | OUTPATIENT
Start: 2023-11-13 | End: 2023-11-13

## 2023-11-13 RX ORDER — NAPROXEN 500 MG/1
500 TABLET ORAL 2 TIMES DAILY PRN
Qty: 20 TABLET | Refills: 0 | Status: SHIPPED | OUTPATIENT
Start: 2023-11-13

## 2023-11-13 RX ORDER — KETOROLAC TROMETHAMINE 30 MG/ML
15 INJECTION, SOLUTION INTRAMUSCULAR; INTRAVENOUS
Status: COMPLETED | OUTPATIENT
Start: 2023-11-13 | End: 2023-11-13

## 2023-11-13 RX ADMIN — KETOROLAC TROMETHAMINE 15 MG: 30 INJECTION, SOLUTION INTRAMUSCULAR; INTRAVENOUS at 06:11

## 2023-11-13 RX ADMIN — ACETAMINOPHEN 650 MG: 325 TABLET ORAL at 06:11

## 2023-11-13 NOTE — Clinical Note
"Ellen"Zehra Cardenas was seen and treated in our emergency department on 11/13/2023.  She may return to work on 11/16/2023.       If you have any questions or concerns, please don't hesitate to call.      Josefina Rodriguez PA-C"

## 2023-11-13 NOTE — FIRST PROVIDER EVALUATION
Medical screening exam completed.  I have conducted a focused provider triage encounter, findings are as follows:    Brief history of present illness:  Here with hand injury. Right hand dominant. Injured her left hand by closing a door on it. Complaining of pain in left 2nd-5th digits.    Vitals:    11/13/23 1731   BP: 130/60   Pulse: 72   Resp: 18   Temp: 99 °F (37.2 °C)   TempSrc: Oral   SpO2: 99%   Weight: 54 kg (119 lb)       Pertinent physical exam:  Awake and alert, no acute distress, left 2nd-5th digits TTP, no open wounds    Brief workup plan:  X-ray    Preliminary workup initiated; this workup will be continued and followed by the physician or advanced practice provider that is assigned to the patient when roomed.

## 2023-11-14 ENCOUNTER — OCCUPATIONAL HEALTH (OUTPATIENT)
Dept: URGENT CARE | Facility: CLINIC | Age: 23
End: 2023-11-14

## 2023-11-14 ENCOUNTER — OFFICE VISIT (OUTPATIENT)
Dept: URGENT CARE | Facility: CLINIC | Age: 23
End: 2023-11-14
Payer: COMMERCIAL

## 2023-11-14 VITALS
SYSTOLIC BLOOD PRESSURE: 109 MMHG | BODY MASS INDEX: 21.09 KG/M2 | OXYGEN SATURATION: 98 % | DIASTOLIC BLOOD PRESSURE: 63 MMHG | HEIGHT: 63 IN | WEIGHT: 119 LBS | HEART RATE: 73 BPM | TEMPERATURE: 98 F

## 2023-11-14 DIAGNOSIS — Z02.6 ENCOUNTER RELATED TO WORKER'S COMPENSATION CLAIM: Primary | ICD-10-CM

## 2023-11-14 DIAGNOSIS — Z02.83 ENCOUNTER FOR DRUG SCREENING: Primary | ICD-10-CM

## 2023-11-14 DIAGNOSIS — S60.222A CONTUSION OF LEFT HAND, INITIAL ENCOUNTER: ICD-10-CM

## 2023-11-14 LAB
CTP QC/QA: YES
POC 10 PANEL DRUG SCREEN: NEGATIVE

## 2023-11-14 PROCEDURE — 80305 POCT RAPID DRUG SCREEN 10 PANEL: ICD-10-PCS | Mod: S$GLB,,, | Performed by: SURGERY

## 2023-11-14 PROCEDURE — 80305 DRUG TEST PRSMV DIR OPT OBS: CPT | Mod: S$GLB,,, | Performed by: SURGERY

## 2023-11-14 PROCEDURE — 99203 OFFICE O/P NEW LOW 30 MIN: CPT | Mod: S$GLB,,, | Performed by: NURSE PRACTITIONER

## 2023-11-14 PROCEDURE — 99203 PR OFFICE/OUTPT VISIT, NEW, LEVL III, 30-44 MIN: ICD-10-PCS | Mod: S$GLB,,, | Performed by: NURSE PRACTITIONER

## 2023-11-14 NOTE — ED NOTES
Patient identifiers verified and correct for Ellen Cardenas   LOC: The patient is awake, alert and aware of environment with an appropriate affect, the patient is oriented x 3 and speaking appropriately.   APPEARANCE: Patient appears comfortable and in no acute distress, patient is clean and well groomed.  SKIN: The skin is warm and dry, color consistent with ethnicity, patient has normal skin turgor and moist mucus membranes, skin intact  MUSCULOSKELETAL: Patient moving all extremities spontaneously, pt c/o pain to left hand   RESPIRATORY: Airway is open and patent, respirations are spontaneous, patient has a normal effort and rate, no accessory muscle use noted,  O2 sats noted at 99% on room air.  CARDIAC: Patient has a normal rate, no edema noted, capillary refill < 3 seconds.   GASTRO: Soft and non tender to palpation, no distention noted, normoactive bowel sounds present in all four quadrants. Pt states bowel movements have been regular.  : Pt denies any pain or frequency with urination.  NEURO: Pt opens eyes spontaneously, behavior appropriate to situation, follows commands, facial expression symmetrical, bilateral hand grasp equal and even, purposeful motor response noted, normal sensation in all extremities when touched with a finger.

## 2023-11-14 NOTE — ED PROVIDER NOTES
Encounter Date: 11/13/2023       History     Chief Complaint   Patient presents with    Hand Injury     Smashed left last three fingers in a door upstairs      23-year-old right-hand-dominant female with history of depression, ectopic pregnancy presents for pain and swelling of her left hand after accidentally slamming it in a door., weakness, wrist pain or pain in the contralateral hand.      Review of patient's allergies indicates:   Allergen Reactions    Latex, natural rubber Itching    Opioids - morphine analogues Hives     Past Medical History:   Diagnosis Date    Asthma     childhood    Constipation     Depression     Ectopic pregnancy      Past Surgical History:   Procedure Laterality Date    LAPAROSCOPIC SURGICAL REMOVAL OF ECTOPIC PREGNANCY      SALPINGECTOMY Left 12/7/2019    Procedure: SALPINGECTOMY;  Surgeon: Felecia Duran MD;  Location: [facility] OR;  Service: OB/GYN;  Laterality: Left;     Family History   Problem Relation Age of Onset    Breast cancer Neg Hx     Colon cancer Neg Hx     Ovarian cancer Neg Hx      Social History     Tobacco Use    Smoking status: Never    Smokeless tobacco: Never   Substance Use Topics    Alcohol use: Yes     Comment: OCCASIONAL    Drug use: Yes     Types: Marijuana     Review of Systems    Physical Exam     Initial Vitals [11/13/23 1731]   BP Pulse Resp Temp SpO2   130/60 72 18 99 °F (37.2 °C) 99 %      MAP       --         Physical Exam    Nursing note and vitals reviewed.  Constitutional: She appears well-developed and well-nourished.   HENT:   Head: Normocephalic and atraumatic.   Musculoskeletal:      Right elbow: Normal.      Left elbow: Normal.      Right forearm: Normal.      Left forearm: Normal.      Right wrist: Normal. No tenderness, bony tenderness or snuff box tenderness. Normal range of motion.      Left wrist: Normal. No tenderness, bony tenderness or snuff box tenderness. Normal range of motion.      Right hand: Normal.      Left hand: Swelling,  tenderness and bony tenderness present.      Comments: Mild swelling of the dorsum of the left hand with bony tenderness.  Able to range the hand, albeit with pain     Skin: Skin is warm and dry.   Psychiatric: She has a normal mood and affect.         ED Course   Procedures  Labs Reviewed   POCT URINE PREGNANCY          Imaging Results              X-Ray Hand 3 View Left (Final result)  Result time 11/13/23 18:17:00      Final result by Bean Mora MD (11/13/23 18:17:00)                   Impression:      No acute findings.      Electronically signed by: Bean Mora  Date:    11/13/2023  Time:    18:17               Narrative:    EXAMINATION:  XR HAND COMPLETE 3 VIEW LEFT    CLINICAL HISTORY:  hand injury;.    TECHNIQUE:  PA, lateral, and oblique views of the left hand were performed.    COMPARISON:  None    FINDINGS:  No acute fracture, dislocation, or traumatic malalignment.  Joint spaces are maintained.                                       Medications   acetaminophen tablet 650 mg (650 mg Oral Given 11/13/23 1816)   ketorolac injection 15 mg (15 mg Intramuscular Given 11/13/23 1817)     Medical Decision Making  23-year-old female presenting for hand pain after minor injury.  Her vitals are normal and she is neurovascularly intact.    Differential diagnosis:  Hand contusion   Hand fracture   No wrist pain or snuffbox tenderness to suggest scaphoid fracture    Will give analgesics, do x-rays and reassess.    For fracture.  Will discharge with hand splint, NSAIDs and instructions to follow up with PCP and return to the ED for worsening symptoms. Stressed the importance of follow-up, strict ED return precautions given.  Patient voiced understanding and is comfortable with discharge. I discussed this patient with my supervising physician.      Amount and/or Complexity of Data Reviewed  Labs: ordered. Decision-making details documented in ED Course.  Radiology:  Decision-making details documented in  ED Course.    Risk  Prescription drug management.               ED Course as of 11/14/23 0056 Mon Nov 13, 2023 1811 Preg Test, Ur: Negative [CC]   1827 X-Ray Hand 3 View Left  Independent interpretation, no acute fracture.  Will place in cock-up wrist splint, discharge [CC]      ED Course User Index  [CC] Josefina Rodriguez PA-C                    Clinical Impression:   Final diagnoses:  [S60.222A] Contusion of left hand, initial encounter (Primary)        ED Disposition Condition    Discharge Stable          ED Prescriptions       Medication Sig Dispense Start Date End Date Auth. Provider    naproxen (NAPROSYN) 500 MG tablet Take 1 tablet (500 mg total) by mouth 2 (two) times daily as needed (Pain). 20 tablet 11/13/2023 -- Josefina Rodriguez PA-C          Follow-up Information       Follow up With Specialties Details Why Contact Info    [facility] Employee Health Administration Schedule an appointment as soon as possible for a visit in 1 week  [address]  [facility] [address]  [phone]    [facility] - Emergency Dept Emergency Medicine Go to  If symptoms worsen [address] [facility] [id number] [phone]             Josefina Rodriguez PA-C  11/14/23 0056

## 2023-11-14 NOTE — DISCHARGE INSTRUCTIONS
Diagnosis:  Hand contusion    Your x-ray did not show any broken bones.  I think your hand pain is due to severe bruising.  You should rest, ice, compress and elevate your hand.  You were given splint in the emergency department.  I am prescribing an anti-inflammatory medicine you take as needed for pain. You should take Tylenol in addition to this as needed for pain up to 3 grams daily which is 6 of the 500 mg extra strength tablets.    Please follow up with employee health and your primary care doctor.  If you start to have any worsening symptoms, please return to the emergency department.

## 2023-11-14 NOTE — LETTER
[facility] - Occupational Health  [address] [facility] [address]  Phone: [phone]  Fax: [phone]  [facility]: [email or web address]    Pt Name: Ellen Cardenas  Injury Date: 11/13/2023   Employee ID: [id number] Date of First Treatment: 11/14/2023   Company: [employer]      Appointment Time:  Arrived: 3:25 PM    Provider: Rosmery Nava NP Time Out:4:24 PM      Office Treatment:   1. Encounter related to worker's compensation claim    2. Contusion of left hand, initial encounter          Patient Instructions: Attention not to aggravate affected area, Apply ice 24-48 hours then apply heat/warm soaks, Elevated affected area, Use splint as directed      Restrictions: Limited use of left hand and arm     Return Appointment: 11/21/2023 at 9:45 AM [facility]

## 2023-11-14 NOTE — PROGRESS NOTES
Subjective:      Patient ID: Ellen Cardenas is a 23 y.o. female.    Chief Complaint: Hand Injury (left)    Patient's place of employment - [employer]  Patient's job title - [employer]  Date of injury - 11/13/2023  Body part injured including left or right - Left hand   Injury Mechanism - Direct Blow   What they were doing when they got hurt - Pushing Biohazard bin through door and door closed on hand   What they did immediately after - Finished work and went to ER afterward   Pain scale right now - 5/10    Provider note below:  This is a 23 y.o. female who presents today with a chief complaint of left hand injury that happened on 11/14/2023, patient works as operating room assistant at [employer], patient reports she was pushing the bio hazard bin  through the door and the door closed on her left hand, patient reports she pulled her hand out right away, patient was seen in the ED yesterday and the x-ray was done that came back negative, patient was given the splint and prescribe naproxen, patient reports she have not filled the prescription that was given to her as she already had the naproxen at home and taking it with no relief, denies any other symptoms        Hand Injury   Her dominant hand is their left hand. The incident occurred 12 to 24 hours ago. The incident occurred at work. The injury mechanism was a direct blow. The pain is present in the left hand. The pain does not radiate. The pain is at a severity of 5/10. The pain is moderate. Associated symptoms include tingling. Pertinent negatives include no chest pain, muscle weakness or numbness. Nothing aggravates the symptoms. She has tried ice and acetaminophen for the symptoms. The treatment provided no relief.       Constitution: Negative for activity change and generalized weakness.   Cardiovascular:  Negative for chest pain.   Musculoskeletal:  Positive for joint pain, joint swelling and abnormal ROM of joint. Negative for muscle cramps and muscle ache.    Skin:  Positive for bruising. Negative for color change.   Neurological:  Negative for numbness and tingling.   Psychiatric/Behavioral:  Negative for sleep disturbance.      Objective:     Physical Exam  Constitutional:       Appearance: Normal appearance.   HENT:      Head: Normocephalic and atraumatic.      Nose: Nose normal.      Mouth/Throat:      Mouth: Mucous membranes are moist.   Eyes:      Extraocular Movements: Extraocular movements intact.      Conjunctiva/sclera: Conjunctivae normal.      Pupils: Pupils are equal, round, and reactive to light.   Cardiovascular:      Rate and Rhythm: Normal rate and regular rhythm.   Pulmonary:      Effort: Pulmonary effort is normal.      Breath sounds: Normal breath sounds.   Musculoskeletal:      Left hand: Swelling (mild to dorsal aspect of left hand/fingers, ROM intact of fingers with pain, no erythema noted), tenderness and bony tenderness present. No deformity or lacerations. Decreased range of motion.      Cervical back: Normal range of motion.      Comments: Cap refill 2 seconds, left radial pulse 2+, sensation intact       Skin:     General: Skin is warm.   Neurological:      General: No focal deficit present.      Mental Status: She is alert.   Psychiatric:         Mood and Affect: Mood normal.         Thought Content: Thought content normal.        X-Ray Hand 3 View Left    Result Date: 11/13/2023  EXAMINATION: XR HAND COMPLETE 3 VIEW LEFT CLINICAL HISTORY: hand injury;. TECHNIQUE: PA, lateral, and oblique views of the left hand were performed. COMPARISON: None FINDINGS: No acute fracture, dislocation, or traumatic malalignment.  Joint spaces are maintained.     No acute findings. Electronically signed by: Bean Mora Date:    11/13/2023 Time:    18:17     Patient in no acute distress.  Vitals reassuring.  Discussed results/diagnosis/plan in depth with patient in clinic. Strict precautions given to patient to monitor for worsening signs and symptoms.  Advised to follow up with primary.All questions answered. Strict ER precautions given. If your symptoms worsens or fail to improve you should go to the Emergency Room. Discharge and follow-up instructions given verbally/printed. Discharge and follow-up instructions discussed with the patient who expressed understanding and willingness to comply with my recommendations.Patient voiced understanding and in agreement with current treatment plan.     Please be advised this text was dictated with [employer] software and may contain errors due to translation.    Assessment:      1. Encounter related to worker's compensation claim    2. Contusion of left hand, initial encounter      Plan:     Ms. Hughes seen today for left hand injury.  X-ray negative for any fracture or dislocation.  I discussed with patient in detail due to the injury mechanism her pain is likely secondary to left hand contusion. Patient is in splint.  Main advised.  Advised patient to continue over-the-counter NSAIDs.  Advised patient not to aggravate the affected hand.  Advised patient to continue using the splint.  Advised patient to return for re-evaluation in 1 week.     Patient Instructions: Attention not to aggravate affected area, Apply ice 24-48 hours then apply heat/warm soaks, Elevated affected area, Use splint as directed   Restrictions: Limited use of left hand and arm  Follow up in about 1 week (around 11/21/2023).

## 2023-11-19 LAB
FINAL PATHOLOGIC DIAGNOSIS: NORMAL
Lab: NORMAL

## 2023-11-20 ENCOUNTER — PATIENT MESSAGE (OUTPATIENT)
Dept: OBSTETRICS AND GYNECOLOGY | Facility: CLINIC | Age: 23
End: 2023-11-20
Payer: MEDICAID

## 2023-11-20 RX ORDER — METRONIDAZOLE 7.5 MG/G
1 GEL VAGINAL DAILY
Qty: 70 G | Refills: 0 | Status: SHIPPED | OUTPATIENT
Start: 2023-11-20 | End: 2023-11-25

## 2023-11-20 RX ORDER — FLUCONAZOLE 150 MG/1
150 TABLET ORAL DAILY
Qty: 1 TABLET | Refills: 1 | Status: SHIPPED | OUTPATIENT
Start: 2023-11-20 | End: 2023-11-21

## 2023-11-22 ENCOUNTER — TELEPHONE (OUTPATIENT)
Dept: URGENT CARE | Facility: CLINIC | Age: 23
End: 2023-11-22
Payer: MEDICAID

## 2023-11-22 NOTE — TELEPHONE ENCOUNTER
Called patient and left a voice message and call back number regarding the missed [facility] Appointment.   AFG

## 2023-11-27 ENCOUNTER — OFFICE VISIT (OUTPATIENT)
Dept: URGENT CARE | Facility: CLINIC | Age: 23
End: 2023-11-27
Payer: COMMERCIAL

## 2023-11-27 VITALS
HEART RATE: 70 BPM | WEIGHT: 119 LBS | DIASTOLIC BLOOD PRESSURE: 58 MMHG | RESPIRATION RATE: 18 BRPM | SYSTOLIC BLOOD PRESSURE: 91 MMHG | BODY MASS INDEX: 21.09 KG/M2 | HEIGHT: 63 IN | OXYGEN SATURATION: 97 %

## 2023-11-27 DIAGNOSIS — S60.222D CONTUSION OF LEFT HAND, SUBSEQUENT ENCOUNTER: ICD-10-CM

## 2023-11-27 DIAGNOSIS — Z02.6 ENCOUNTER RELATED TO WORKER'S COMPENSATION CLAIM: Primary | ICD-10-CM

## 2023-11-27 PROCEDURE — 99212 OFFICE O/P EST SF 10 MIN: CPT | Mod: S$GLB,,, | Performed by: SURGERY

## 2023-11-27 PROCEDURE — 99212 PR OFFICE/OUTPT VISIT, EST, LEVL II, 10-19 MIN: ICD-10-PCS | Mod: S$GLB,,, | Performed by: SURGERY

## 2023-11-27 NOTE — PROGRESS NOTES
Subjective:      Patient ID: Ellen Cardenas is a 23 y.o. female.    Chief Complaint: Hand Injury (LT)    Patient's place of employment - [employer]  Patient's job title - [employer]  Date of Injury - 11-13-23  Body part injured - LT Hand  Current work status per last visit - Limited use of LT Hand/Arm  Improved, same, or worse - Same  Pain Scale right now (1-10) -  6-7/10    Hand Injury   Associated symptoms include numbness.       Musculoskeletal:  Positive for pain, joint pain, abnormal ROM of joint and muscle ache. Negative for trauma, joint swelling and muscle cramps.   Skin:  Negative for bruising.   Neurological:  Positive for numbness and tingling.     See MA note above. Begin MD note:    Ellen Cardenas is a 23 y.o. female presenting for follow-up of left hand injury.  She reports that pain symptoms are unchanged from prior despite resolution of swelling and bruising.  She is been using ibuprofen, naproxen, and Tylenol for pain.  Reports pain with moving fingers.  Has stopped icing after resolution of swelling, has not attempted to use heat.  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  Reports that she takes off brace periodically.    Objective:     Physical Exam  Vitals and nursing note reviewed.   HENT:      Head: Normocephalic.   Eyes:      Conjunctiva/sclera: Conjunctivae normal.   Pulmonary:      Effort: Pulmonary effort is normal.   Musculoskeletal:      Left hand: Tenderness present.      Comments: No swelling or gross deformity of hand appreciated. Cap refill <2. TTP of the MCP joints of 1st through 3rd digits.  strength on left decreased secondary to pain.   Neurological:      General: No focal deficit present.      Mental Status: She is alert and oriented to person, place, and time.      Motor: Motor function is intact.      Coordination: Coordination is intact.   Psychiatric:         Attention and  Perception: Attention normal.         Mood and Affect: Mood normal.         Speech: Speech normal.         Behavior: Behavior normal. Behavior is cooperative.         Thought Content: Thought content normal.        Assessment:      1. Encounter related to worker's compensation claim    2. Contusion of left hand, subsequent encounter      Plan:     Swelling and bruising have resolved, subjective pain scale would suggest worsening of pain as there was increase from last visit to now. Persistent pain symptoms may be secondary to use of wrist splint, she was advised to discontinue use throughout the day. Counseled her on use of NSAIDs, to discontinue use of either naproxen or ibuprofen, may continue use of Tylenol, I recommended use of OTC acetaminophen-ibuprofen combo. We discussed the treatment course, she asked about the need for therapy, I do not think she needs PT as I do not see any functional deficits in her left hand. Gradual reduction in work restrictions with anticipation for return to regular duty at next visit. Will follow-up in 2 weeks.     Diagnoses and plan discussed with the patient, as well as the expected course and duration of symptoms. Risks and benefits of any medication prescribed during this visit was explained, verbal instructions on use given. Clinic/Emergency department return precautions were given, can return to [facility] before scheduled follow-up appointment if notes worsening/aggravation of symptoms. All questions and concerns were addressed prior to discharge. Plan was developed with active input from the patient and they verbalized understanding of and agreement with the POC.   Note was dictated with voice recognition software, please excuse any grammatical errors.         Restrictions: Limited use of left hand and arm (Occasional (<50% of shift) use of left hand/arm)  Follow up in about 2 weeks (around 12/11/2023).

## 2023-11-27 NOTE — LETTER
[facility] - Occupational Health  [address] [address]  Phone: [phone]  Fax: [phone]  [facility]: [email or web address]    Pt Name: Ellen Cardenas  Injury Date: 11/13/2023   Employee ID: [id number] Date of Treatment: 11/29/2023   Company: [employer]      Appointment Time: 03:30 PM Arrived: 3:00 PM    Provider: Mariana Anderson MD Time Out:3:42 PM      Office Treatment:   1. Encounter related to worker's compensation claim    2. Contusion of left hand, subsequent encounter               Restrictions: Limited use of left hand and arm (Occasional (<50% of shift) use of left hand/arm)     Return Appointment: 12/11/2023 at 9:15 AM [facility]

## 2023-11-27 NOTE — LETTER
[facility] - Occupational Health  [address] [address]  Phone: [phone]  Fax: [phone]  [facility]: [email or web address]    Pt Name: Ellen Cardenas  Injury Date: 11/13/2023   Employee ID: [id number] Date of Treatment: 11/27/2023   Company: [employer]      Appointment Time: 03:30 PM Arrived: 3:00 PM    Provider: Mariana Anderson MD Time Out:3:42 PM     Office Treatment: No diagnosis found.           Restrictions: Limited use of left hand and arm (Occasional (<50% of shift) use of left hand/arm)     Return Appointment: 12/11/2023 at 9:15 AM [facility]

## 2023-12-06 ENCOUNTER — PATIENT OUTREACH (OUTPATIENT)
Dept: EMERGENCY MEDICINE | Facility: HOSPITAL | Age: 23
End: 2023-12-06
Payer: MEDICAID

## 2023-12-11 ENCOUNTER — HOSPITAL ENCOUNTER (EMERGENCY)
Facility: HOSPITAL | Age: 23
Discharge: LEFT BEFORE DISCHARGE | End: 2023-12-11
Attending: EMERGENCY MEDICINE
Payer: MEDICAID

## 2023-12-11 ENCOUNTER — OFFICE VISIT (OUTPATIENT)
Dept: URGENT CARE | Facility: CLINIC | Age: 23
End: 2023-12-11
Payer: COMMERCIAL

## 2023-12-11 VITALS
DIASTOLIC BLOOD PRESSURE: 60 MMHG | RESPIRATION RATE: 18 BRPM | HEART RATE: 62 BPM | SYSTOLIC BLOOD PRESSURE: 104 MMHG | TEMPERATURE: 98 F | OXYGEN SATURATION: 98 %

## 2023-12-11 VITALS
SYSTOLIC BLOOD PRESSURE: 95 MMHG | BODY MASS INDEX: 21.09 KG/M2 | HEIGHT: 63 IN | OXYGEN SATURATION: 97 % | DIASTOLIC BLOOD PRESSURE: 60 MMHG | WEIGHT: 119 LBS | RESPIRATION RATE: 16 BRPM | HEART RATE: 60 BPM | TEMPERATURE: 99 F

## 2023-12-11 DIAGNOSIS — R11.0 NAUSEA: Primary | ICD-10-CM

## 2023-12-11 DIAGNOSIS — Z02.6 ENCOUNTER RELATED TO WORKER'S COMPENSATION CLAIM: ICD-10-CM

## 2023-12-11 DIAGNOSIS — S60.222D CONTUSION OF LEFT HAND, SUBSEQUENT ENCOUNTER: Primary | ICD-10-CM

## 2023-12-11 LAB
B-HCG UR QL: NEGATIVE
CTP QC/QA: YES

## 2023-12-11 PROCEDURE — 25000003 PHARM REV CODE 250

## 2023-12-11 PROCEDURE — 96374 THER/PROPH/DIAG INJ IV PUSH: CPT

## 2023-12-11 PROCEDURE — 99212 OFFICE O/P EST SF 10 MIN: CPT | Mod: S$GLB,,, | Performed by: SURGERY

## 2023-12-11 PROCEDURE — 63600175 PHARM REV CODE 636 W HCPCS

## 2023-12-11 PROCEDURE — 99212 PR OFFICE/OUTPT VISIT, EST, LEVL II, 10-19 MIN: ICD-10-PCS | Mod: S$GLB,,, | Performed by: SURGERY

## 2023-12-11 PROCEDURE — 81025 URINE PREGNANCY TEST: CPT | Performed by: EMERGENCY MEDICINE

## 2023-12-11 PROCEDURE — 99284 EMERGENCY DEPT VISIT MOD MDM: CPT | Mod: 25

## 2023-12-11 RX ORDER — ONDANSETRON 2 MG/ML
4 INJECTION INTRAMUSCULAR; INTRAVENOUS
Status: COMPLETED | OUTPATIENT
Start: 2023-12-11 | End: 2023-12-11

## 2023-12-11 RX ORDER — FAMOTIDINE 20 MG/1
20 TABLET, FILM COATED ORAL ONCE
Status: COMPLETED | OUTPATIENT
Start: 2023-12-11 | End: 2023-12-11

## 2023-12-11 RX ORDER — FAMOTIDINE 20 MG/1
20 TABLET, FILM COATED ORAL 2 TIMES DAILY
Status: DISCONTINUED | OUTPATIENT
Start: 2023-12-11 | End: 2023-12-11

## 2023-12-11 RX ADMIN — ONDANSETRON 4 MG: 2 INJECTION INTRAMUSCULAR; INTRAVENOUS at 08:12

## 2023-12-11 RX ADMIN — FAMOTIDINE 20 MG: 20 TABLET, FILM COATED ORAL at 08:12

## 2023-12-11 NOTE — LETTER
[facility]  [address] [address]  Phone: [phone]  Fax: [phone]  [facility] [email or web address]    Pt Name: Ellen Cardenas  Injury Date: 11/13/2023   Employee ID: [id number] Date of Treatment: 12/11/2023   Company: [employer]      Appointment Time: 01:15 PM Arrived: 12:24pm   Provider: Mariana Anderson MD Time Out:1:10pm     Office Treatment:   1. Encounter related to worker's compensation claim               Restrictions: Regular Duty, Discharged from Occupational Health (RTW 12/11/23)     Return Appointment: Visit date PRN

## 2023-12-11 NOTE — PROGRESS NOTES
Subjective:      Patient ID: Ellen Cardenas is a 23 y.o. female.    Chief Complaint: Hand Injury (Left hand RHD)    Patient's place of employment - [employer]  Patient's job title - [employer]  Date of Injury - 11/13/23  Body part injured - Left hand  Current work status per last visit - Light  Improved, same, or worse - Improved  Pain Scale right now (1-10) -  0    Hand Injury   Her dominant hand is their right hand. Pertinent negatives include no numbness.       Musculoskeletal:  Positive for muscle ache. Negative for pain, trauma, joint pain, joint swelling, abnormal ROM of joint and muscle cramps.   Skin:  Negative for bruising.   Neurological:  Negative for numbness and tingling.       See MA note above. Begin MD note:    Ellen Cardenas is a 23 y.o. female presenting for follow-up of left hand injury. Reports her hand is feeling much better, some soreness but overall improved. She continues to work within the restrictions but feels she is able to complete the regular duties of her job. No meds, no brace.     Objective:     Physical Exam  Vitals and nursing note reviewed.   HENT:      Head: Normocephalic.   Eyes:      Conjunctiva/sclera: Conjunctivae normal.   Pulmonary:      Effort: Pulmonary effort is normal.   Musculoskeletal:      Comments: NTTP of the dorsum and palmar surface of hand.  strength is 5/5.    Neurological:      General: No focal deficit present.      Mental Status: She is alert and oriented to person, place, and time.      Motor: Motor function is intact.      Coordination: Coordination is intact.   Psychiatric:         Attention and Perception: Attention normal.         Mood and Affect: Mood normal.         Speech: Speech normal.         Behavior: Behavior normal. Behavior is cooperative.         Thought Content: Thought content normal.      Assessment:      1. Contusion of left hand, subsequent encounter    2. Encounter related to worker's compensation claim      Plan:     She reports  continued improvements and feels able to return to regular duty without a need for continued follow-up. Discharge from Occ Health.     Diagnoses and plan discussed with the patient, all questions and concerns were addressed prior to discharge. Follow-up as needed if any reoccurrence of symptoms related to the present condition. Plan was developed with active input from the patient and they verbalized understanding of and agreement with the POC.   Note was dictated with voice recognition software, please excuse any grammatical errors.         Restrictions: Regular Duty, Discharged from Occupational Health (RTW 12/11/23)  Follow up if symptoms worsen or fail to improve.

## 2023-12-12 NOTE — FIRST PROVIDER EVALUATION
Medical screening examination initiated.  I have conducted a focused provider triage encounter, findings are as follows:    Brief history of present illness:  nausea all day, would like to be checked for pregnancy. Mild crampy lower abdominal pain. Currently trying to get pregnant.  LMP 11/19    There were no vitals filed for this visit.    Pertinent physical exam:  nontoxic    Brief workup plan: UPT, intake    Preliminary workup initiated; this workup will be continued and followed by the physician or advanced practice provider that is assigned to the patient when roomed.

## 2023-12-12 NOTE — ED PROVIDER NOTES
Recent this has store Encounter Date: 12/11/2023       History     Chief Complaint   Patient presents with    Nausea     Reports nausea x 2 days without vomiting. Denies vaginal bleeding, pelvic pain. Reports she's been pregnant before and this feels like morning sickness, she would like a test. LMP 11/19     23-year-old female presents to emergency department due to nausea x2 days.  She was still able to tolerate p.o. liquids without difficulty but does endorse some epigastric discomfort when eating.  Patient believes that she was pregnant as she was trying.  She was not taking it at home pregnancy test she states her expected menstrual cycle should be on Friday of this month.  Patient has not actually experienced any episodes of emesis.  No fever.  No diarrhea.  No abdominal pain.  No dysuria hematuria or flank pain      Review of patient's allergies indicates:   Allergen Reactions    Latex, natural rubber Itching    Opioids - morphine analogues Hives     Past Medical History:   Diagnosis Date    Asthma     childhood    Constipation     Depression     Ectopic pregnancy      Past Surgical History:   Procedure Laterality Date    LAPAROSCOPIC SURGICAL REMOVAL OF ECTOPIC PREGNANCY      SALPINGECTOMY Left 12/7/2019    Procedure: SALPINGECTOMY;  Surgeon: Felecia Duran MD;  [facility]: [facility];  Service: OB/GYN;  Laterality: Left;     Family History   Problem Relation Age of Onset    Breast cancer Neg Hx     Colon cancer Neg Hx     Ovarian cancer Neg Hx      Social History     Tobacco Use    Smoking status: Never    Smokeless tobacco: Never   Substance Use Topics    Alcohol use: Yes     Comment: OCCASIONAL    Drug use: Yes     Types: Marijuana     Review of Systems  See HPI  Physical Exam     Initial Vitals [12/11/23 1941]   BP Pulse Resp Temp SpO2   104/60 62 18 98.2 °F (36.8 °C) 98 %      MAP       --         Physical Exam    Constitutional: She appears well-developed and well-nourished.   Patient was well-appearing    HENT:   Head: Normocephalic and atraumatic.   Eyes: Conjunctivae and EOM are normal.   Neck:   Normal range of motion.  Cardiovascular:  Normal rate.           Pulmonary/Chest: No respiratory distress.   Abdominal: Abdomen is soft. She exhibits no distension. There is no abdominal tenderness. There is no rebound.   Musculoskeletal:         General: Normal range of motion.      Cervical back: Normal range of motion.     Neurological: She is alert and oriented to person, place, and time.   Skin: Skin is warm and dry.   Psychiatric: She has a normal mood and affect. Thought content normal.         ED Course   Procedures  Labs Reviewed   HIV 1 / 2 ANTIBODY   HEPATITIS C ANTIBODY   POCT URINE PREGNANCY          Imaging Results    None          Medications   ondansetron injection 4 mg (4 mg Intravenous Given 12/11/23 2026)   famotidine tablet 20 mg (20 mg Oral Given 12/11/23 2030)     Medical Decision Making  23-year-old female presents emergency department for pregnancy test due to her 2 days of nausea.  Still tolerating p.o. with some discomfort.  Abdomen is benign.  Based on physical exam not concerned for cholecystitis or appendicitis.  Patient also had a negative pregnancy test.  Patient could be having episodic gastritis or viral GI.  She was given symptomatic management in the emergency department and then left prior to being discharged.     Risk  Prescription drug management.                                      Clinical Impression:  Final diagnoses:  [R11.0] Nausea (Primary)                 Neida Kim PA-C  12/11/23 2106

## 2023-12-12 NOTE — ED NOTES
Attempted to call patient; no answer. Left message with instructions for patient to return to ED if she has any questions or worsening symptoms.

## 2023-12-12 NOTE — ED NOTES
Patient identifiers verified and correct for  Ms Cardenas  C/C: Nausea SEE NN  APPEARANCE: awake and alert in NAD. PAIN  0/10  SKIN: warm, dry and intact. No breakdown or bruising.  MUSCULOSKELETAL: Patient moving all extremities spontaneously, no obvious swelling or deformities noted. Ambulates independently.  RESPIRATORY: Denies shortness of breath.Respirations unlabored.   CARDIAC: Denies CP, 2+ distal pulses; no peripheral edema  ABDOMEN: S/ND/NT, Positive nausea  : voids spontaneously, denies difficulty  Neurologic: AAO x 4; follows commands equal strength in all extremities; denies numbness/tingling. Denies dizziness  Denes new wekaness

## 2024-01-31 ENCOUNTER — PATIENT OUTREACH (OUTPATIENT)
Dept: EMERGENCY MEDICINE | Facility: HOSPITAL | Age: 24
End: 2024-01-31
Payer: MEDICAID

## 2024-01-31 NOTE — PROGRESS NOTES
Call placed per ED Navigator to f/u from last encounter. No answer. ED navigator will follow-up with patient on/around 2-28-24.

## 2024-04-17 ENCOUNTER — CLINICAL SUPPORT (OUTPATIENT)
Dept: OBSTETRICS AND GYNECOLOGY | Facility: CLINIC | Age: 24
End: 2024-04-17
Payer: MEDICAID

## 2024-04-17 ENCOUNTER — PATIENT MESSAGE (OUTPATIENT)
Dept: OBSTETRICS AND GYNECOLOGY | Facility: CLINIC | Age: 24
End: 2024-04-17
Payer: MEDICAID

## 2024-04-17 DIAGNOSIS — N91.2 AMENORRHEA: Primary | ICD-10-CM

## 2024-04-19 ENCOUNTER — HOSPITAL ENCOUNTER (EMERGENCY)
Facility: OTHER | Age: 24
Discharge: HOME | End: 2024-04-19
Attending: EMERGENCY MEDICINE
Payer: MEDICAID

## 2024-04-19 VITALS
HEART RATE: 86 BPM | HEIGHT: 63 IN | OXYGEN SATURATION: 99 % | WEIGHT: 115 LBS | SYSTOLIC BLOOD PRESSURE: 115 MMHG | TEMPERATURE: 98 F | DIASTOLIC BLOOD PRESSURE: 73 MMHG | RESPIRATION RATE: 18 BRPM | BODY MASS INDEX: 20.38 KG/M2

## 2024-04-19 DIAGNOSIS — N89.8 VAGINAL ITCHING: Primary | ICD-10-CM

## 2024-04-19 LAB
B-HCG UR QL: POSITIVE
CTP QC/QA: YES

## 2024-04-19 PROCEDURE — 81025 URINE PREGNANCY TEST: CPT | Performed by: NURSE PRACTITIONER

## 2024-04-19 PROCEDURE — 87491 CHLMYD TRACH DNA AMP PROBE: CPT | Performed by: NURSE PRACTITIONER

## 2024-04-19 PROCEDURE — 99283 EMERGENCY DEPT VISIT LOW MDM: CPT

## 2024-04-19 PROCEDURE — 25000003 PHARM REV CODE 250: Performed by: NURSE PRACTITIONER

## 2024-04-19 RX ORDER — ASPIRIN 325 MG
1 TABLET, DELAYED RELEASE (ENTERIC COATED) ORAL NIGHTLY
Status: DISCONTINUED | OUTPATIENT
Start: 2024-04-19 | End: 2024-04-20 | Stop reason: HOSPADM

## 2024-04-19 RX ADMIN — CLOTRIMAZOLE 1 APPLICATOR: 1 CREAM VAGINAL at 11:04

## 2024-04-20 NOTE — FIRST PROVIDER EVALUATION
Emergency Department TeleTriage Encounter Note      CHIEF COMPLAINT    Chief Complaint   Patient presents with    Vaginal Itching     C/o vaginal itch, white discharge, abd cramping 3/10 x 2 days. 5 wks pregnant. . Dx. UTI  currently on Keflex. VSS       VITAL SIGNS   Initial Vitals [24]   BP Pulse Resp Temp SpO2   112/60 95 17 98.3 °F (36.8 °C) 99 %      MAP       --            ALLERGIES    Review of patient's allergies indicates:   Allergen Reactions    Latex, natural rubber Itching    Opioids - morphine analogues Hives       PROVIDER TRIAGE NOTE  Verbal consent for the teletriage evaluation was given by the patient at the start of the evaluation.  All efforts will be made to maintain patient's privacy during the evaluation.      This is a teletriage evaluation of a 23 y.o. female presenting to the ED with c/o approximately 5 weeks pregnant.  Currently on ABX for a UTI.  Has some vaginal discharge and irritation.  OBGYN Dr. Melo. Limited physical exam via telehealth: The patient is awake, alert, answering questions appropriately and is not in respiratory distress.  As the Teletriage provider, I performed an initial assessment and ordered appropriate labs and imaging studies, if any, to facilitate the patient's care once placed in the ED. Once a room is available, care and a full evaluation will be completed by an alternate ED provider.  Any additional orders and the final disposition will be determined by that provider.  All imaging and labs will not be followed-up by the Teletriage Team, including myself.          ORDERS  Labs Reviewed   VAGINAL SCREEN   URINALYSIS, REFLEX TO URINE CULTURE   C. TRACHOMATIS/N. GONORRHOEAE BY AMP DNA   POCT URINE PREGNANCY       ED Orders (720h ago, onward)      Start Ordered     Status Ordering Provider    24  C. trachomatis/N. gonorrhoeae by AMP DNA [employer]; Cervix  STAT         Ordered NATHALIE YEE    24   Vaginal Screen  STAT         Ordered NATHALIE YEE    04/19/24 2138 04/19/24 2138  POCT urine pregnancy  Once         Ordered NATHALIE YEE    04/19/24 2138 04/19/24 2138  Urinalysis, Reflex to Urine Culture Urine, Clean Catch  STAT         Ordered NATHALIE YEE MICHELLE              Virtual Visit Note: The provider triage portion of this emergency department evaluation and documentation was performed via [employer], a HIPAA-compliant telemedicine application, in concert with a tele-presenter in the room. A face to face patient evaluation with one of my colleagues will occur once the patient is placed in an emergency department room.      DISCLAIMER: This note was prepared with [employer] voice recognition transcription software. Garbled syntax, mangled pronouns, and other bizarre constructions may be attributed to that software system.

## 2024-04-20 NOTE — ED PROVIDER NOTES
"     Source of History:  Patient    Chief complaint:  Vaginal Itching (C/o vaginal itch, white discharge, abd cramping 3/10 x 2 days. 5 wks pregnant. . Dx. UTI  currently on Keflex. VSS)      HPI:  Ellen Cardenas is a 23 y.o. female presenting with vaginal irritation, vaginal itching and white chunky discharge.  She states that she just finished antibiotics today for asymptomatic UTI in pregnancy.  She was seen at [facility] and US showed no IUP or adnexal abnormalities to suggest ectopic representing either early pregnancy or spontaneous miscarriage. She reports intermittent abdominal cramping but denies vaginal bleeding and abdominal pain currently.  She has OBGYN f/u established.    This is the extent to the patients complaints today here in the emergency department.    ROS: As per HPI       Review of patient's allergies indicates:   Allergen Reactions    Latex, natural rubber Itching    Opioids - morphine analogues Hives       PMH:  As per HPI and below:  Past Medical History:   Diagnosis Date    Asthma     childhood    Constipation     Depression     Ectopic pregnancy      Past Surgical History:   Procedure Laterality Date    LAPAROSCOPIC SURGICAL REMOVAL OF ECTOPIC PREGNANCY      SALPINGECTOMY Left 2019    Procedure: SALPINGECTOMY;  Surgeon: Felecia Duran MD;  Location: [facility] OR;  Service: OB/GYN;  Laterality: Left;       Social History     Tobacco Use    Smoking status: Never    Smokeless tobacco: Never   Substance Use Topics    Alcohol use: Yes     Comment: OCCASIONAL    Drug use: Yes     Types: Marijuana       Physical Exam:    /73   Pulse 86   Temp 98.2 °F (36.8 °C)   Resp 18   Ht 5' 3" (1.6 m)   Wt 52.2 kg (115 lb)   SpO2 99%   BMI 20.37 kg/m²   Nursing note and vital signs reviewed.  Appearance: Afebrile. Not toxic appearing. No acute distress.  Head: Atraumatic  Eyes: No conjunctival injection. No scleral icterus  ENT: Normal phonation  Chest/ Respiratory: No respiratory " distress. No accessory muscle use.  Cardiovascular: Regular rate   Abdomen:  Not distended.  Soft, nontender  : Exam deferred.  Musculoskeletal: Good range of motion all joints.  No deformities.  Neck supple.  No meningismus.  Skin: No rashes seen.  Good turgor.  No ecchymoses.  Neurologic: GCS 15. Ambulates with a steady gait.   Mental Status:  Alert and oriented x 3.  Appropriate, conversant    Labs that have been ordered have been independently reviewed and interpreted by myself.    2024 12:35 PM CDT   1. No intrauterine or extrauterine gestation is identified. Differential considerations include early pregnancy, spontaneous , a nonvisualized occult ectopic pregnancy. Recommend further evaluation with serial lab values and short interval follow-up ultrasound.  2. 1.8 cm heterogeneous/complex focus in right ovary is statistically most likely hemorrhagic cyst. Recommend follow-up imaging.    Electronically Signed By: Murray Garcia MD 2024 12:35 PM CDT     Labs Reviewed   POCT URINE PREGNANCY - Abnormal; Notable for the following components:       Result Value    POC Preg Test, Ur Positive (*)     All other components within normal limits   C. TRACHOMATIS/N. GONORRHOEAE BY AMP DNA       Imaging Results    None           Initial Impression/ Differential Dx:  Differential Diagnosis includes, but is not limited to:  Vaginitis, candida, STI      MDM:    Urgent evaluation of 23 y.o. female presenting with vaginal irritation, itching and white chunky discharge after finishing antibiotics for UTI.  Abdominal exam is benign.  Patient with pregnancy of unknown location.  She has follow-up ultrasound and OBGYN visits scheduled.  I do not feel like ultrasound is indicated at this time.  Patient reports intermittent abdominal cramping but denies abdominal pain currently and abdominal exam benign.  Patient tested positive for chlamydia and gonorrhea a month ago and has not been retested.  Will retest today.   Patient can self swab as presentation not consistent with PID.  History sounds most consistent with yeast infection consistent with recent antibiotic use.  Patient treated with 7 day course of intravaginal clotrimazole given pregnancy.  Patient given strict pain and bleeding precautions and instructed to follow-up with her OBGYN as scheduled. Patient educated on signs and symptoms to monitor for and when to return to ED. Patient verbalized understanding agrees with treatment plan. All questions and concerns addressed.                      Diagnostic Impression:    1. Vaginal itching         ED Disposition Condition    Discharge Good            ED Prescriptions    None       Follow-up Information       Follow up With Specialties Details Why Contact Info    your OBGYN   As needed              Alexi Delacruz NP  04/20/24 0029

## 2024-04-21 LAB
C TRACH DNA SPEC QL NAA+PROBE: DETECTED
N GONORRHOEA DNA SPEC QL NAA+PROBE: NOT DETECTED

## 2024-04-22 DIAGNOSIS — O98.811 CHLAMYDIA INFECTION AFFECTING PREGNANCY IN FIRST TRIMESTER: Primary | ICD-10-CM

## 2024-04-22 DIAGNOSIS — A74.9 CHLAMYDIA INFECTION AFFECTING PREGNANCY IN FIRST TRIMESTER: Primary | ICD-10-CM

## 2024-04-22 RX ORDER — AZITHROMYCIN 500 MG/1
TABLET, FILM COATED ORAL
Qty: 2 TABLET | Refills: 0 | Status: SHIPPED | OUTPATIENT
Start: 2024-04-22 | End: 2024-06-04

## 2024-04-25 ENCOUNTER — HOSPITAL ENCOUNTER (EMERGENCY)
Facility: OTHER | Age: 24
Discharge: HOME | End: 2024-04-25
Attending: EMERGENCY MEDICINE
Payer: MEDICAID

## 2024-04-25 VITALS
OXYGEN SATURATION: 100 % | DIASTOLIC BLOOD PRESSURE: 77 MMHG | WEIGHT: 115 LBS | TEMPERATURE: 99 F | HEART RATE: 88 BPM | BODY MASS INDEX: 20.38 KG/M2 | SYSTOLIC BLOOD PRESSURE: 114 MMHG | HEIGHT: 63 IN | RESPIRATION RATE: 18 BRPM

## 2024-04-25 DIAGNOSIS — O26.891 ABDOMINAL PAIN DURING PREGNANCY IN FIRST TRIMESTER: Primary | ICD-10-CM

## 2024-04-25 DIAGNOSIS — R11.2 NAUSEA AND VOMITING, UNSPECIFIED VOMITING TYPE: ICD-10-CM

## 2024-04-25 DIAGNOSIS — R10.9 ABDOMINAL PAIN DURING PREGNANCY IN FIRST TRIMESTER: Primary | ICD-10-CM

## 2024-04-25 LAB
ALBUMIN SERPL BCP-MCNC: 4.7 G/DL (ref 3.5–5.2)
ALP SERPL-CCNC: 41 U/L (ref 55–135)
ALT SERPL W/O P-5'-P-CCNC: 18 U/L (ref 10–44)
ANION GAP SERPL CALC-SCNC: 11 MMOL/L (ref 8–16)
AST SERPL-CCNC: 19 U/L (ref 10–40)
B-HCG UR QL: POSITIVE
BASOPHILS # BLD AUTO: 0.01 K/UL (ref 0–0.2)
BASOPHILS NFR BLD: 0.1 % (ref 0–1.9)
BILIRUB SERPL-MCNC: 0.5 MG/DL (ref 0.1–1)
BILIRUB UR QL STRIP: NEGATIVE
BUN SERPL-MCNC: 8 MG/DL (ref 6–20)
CALCIUM SERPL-MCNC: 9.7 MG/DL (ref 8.7–10.5)
CHLORIDE SERPL-SCNC: 105 MMOL/L (ref 95–110)
CLARITY UR: CLEAR
CO2 SERPL-SCNC: 19 MMOL/L (ref 23–29)
COLOR UR: YELLOW
CREAT SERPL-MCNC: 0.8 MG/DL (ref 0.5–1.4)
CTP QC/QA: YES
DIFFERENTIAL METHOD BLD: NORMAL
EOSINOPHIL # BLD AUTO: 0.1 K/UL (ref 0–0.5)
EOSINOPHIL NFR BLD: 0.7 % (ref 0–8)
ERYTHROCYTE [DISTWIDTH] IN BLOOD BY AUTOMATED COUNT: 13 % (ref 11.5–14.5)
EST. GFR  (NO RACE VARIABLE): >60 ML/MIN/1.73 M^2
GLUCOSE SERPL-MCNC: 83 MG/DL (ref 70–110)
GLUCOSE UR QL STRIP: NEGATIVE
HCG INTACT+B SERPL-ACNC: NORMAL MIU/ML
HCT VFR BLD AUTO: 40.8 % (ref 37–48.5)
HCV AB SERPL QL IA: NEGATIVE
HGB BLD-MCNC: 13.1 G/DL (ref 12–16)
HGB UR QL STRIP: NEGATIVE
HIV 1+2 AB+HIV1 P24 AG SERPL QL IA: NEGATIVE
IMM GRANULOCYTES # BLD AUTO: 0.02 K/UL (ref 0–0.04)
IMM GRANULOCYTES NFR BLD AUTO: 0.2 % (ref 0–0.5)
KETONES UR QL STRIP: ABNORMAL
LEUKOCYTE ESTERASE UR QL STRIP: NEGATIVE
LYMPHOCYTES # BLD AUTO: 2.1 K/UL (ref 1–4.8)
LYMPHOCYTES NFR BLD: 24.7 % (ref 18–48)
MCH RBC QN AUTO: 29.3 PG (ref 27–31)
MCHC RBC AUTO-ENTMCNC: 32.1 G/DL (ref 32–36)
MCV RBC AUTO: 91 FL (ref 82–98)
MONOCYTES # BLD AUTO: 0.6 K/UL (ref 0.3–1)
MONOCYTES NFR BLD: 6.5 % (ref 4–15)
NEUTROPHILS # BLD AUTO: 5.8 K/UL (ref 1.8–7.7)
NEUTROPHILS NFR BLD: 67.8 % (ref 38–73)
NITRITE UR QL STRIP: NEGATIVE
NRBC BLD-RTO: 0 /100 WBC
PH UR STRIP: 7 [PH] (ref 5–8)
PLATELET # BLD AUTO: 271 K/UL (ref 150–450)
PMV BLD AUTO: 9.3 FL (ref 9.2–12.9)
POTASSIUM SERPL-SCNC: 3.8 MMOL/L (ref 3.5–5.1)
PROT SERPL-MCNC: 8.1 G/DL (ref 6–8.4)
PROT UR QL STRIP: NEGATIVE
RBC # BLD AUTO: 4.47 M/UL (ref 4–5.4)
SODIUM SERPL-SCNC: 135 MMOL/L (ref 136–145)
SP GR UR STRIP: 1.02 (ref 1–1.03)
URN SPEC COLLECT METH UR: ABNORMAL
UROBILINOGEN UR STRIP-ACNC: NEGATIVE EU/DL
WBC # BLD AUTO: 8.58 K/UL (ref 3.9–12.7)

## 2024-04-25 PROCEDURE — 81003 URINALYSIS AUTO W/O SCOPE: CPT

## 2024-04-25 PROCEDURE — 85025 COMPLETE CBC W/AUTO DIFF WBC: CPT

## 2024-04-25 PROCEDURE — 25000003 PHARM REV CODE 250

## 2024-04-25 PROCEDURE — 86803 HEPATITIS C AB TEST: CPT

## 2024-04-25 PROCEDURE — 80053 COMPREHEN METABOLIC PANEL: CPT

## 2024-04-25 PROCEDURE — 84702 CHORIONIC GONADOTROPIN TEST: CPT

## 2024-04-25 PROCEDURE — 99284 EMERGENCY DEPT VISIT MOD MDM: CPT | Mod: 25

## 2024-04-25 PROCEDURE — 87389 HIV-1 AG W/HIV-1&-2 AB AG IA: CPT

## 2024-04-25 PROCEDURE — 81025 URINE PREGNANCY TEST: CPT

## 2024-04-25 RX ORDER — ONDANSETRON 4 MG/1
4 TABLET, ORALLY DISINTEGRATING ORAL
Status: COMPLETED | OUTPATIENT
Start: 2024-04-25 | End: 2024-04-25

## 2024-04-25 RX ORDER — ONDANSETRON 4 MG/1
4 TABLET, ORALLY DISINTEGRATING ORAL EVERY 6 HOURS PRN
Qty: 20 TABLET | Refills: 0 | Status: SHIPPED | OUTPATIENT
Start: 2024-04-25 | End: 2024-06-04 | Stop reason: SDUPTHER

## 2024-04-25 RX ORDER — ACETAMINOPHEN 325 MG/1
650 TABLET ORAL
Status: COMPLETED | OUTPATIENT
Start: 2024-04-25 | End: 2024-04-25

## 2024-04-25 RX ORDER — AZITHROMYCIN 500 MG/1
1000 TABLET, FILM COATED ORAL DAILY
Qty: 2 TABLET | Refills: 0 | Status: SHIPPED | OUTPATIENT
Start: 2024-04-25 | End: 2024-04-26

## 2024-04-25 RX ADMIN — ACETAMINOPHEN 650 MG: 325 TABLET, FILM COATED ORAL at 03:04

## 2024-04-25 RX ADMIN — ONDANSETRON 4 MG: 4 TABLET, ORALLY DISINTEGRATING ORAL at 03:04

## 2024-04-25 NOTE — ED PROVIDER NOTES
Source of History:  Patient    Chief complaint:  Abdominal Pain (Pt, who is approximately 5 weeks pregnant, presents to the ER with complaints of lower abdominal cramping that started last night. Pt reports a hx of ectopic pregnancy x 2. Pt states current symptoms are similar; denies vaginal bleeding. Pt states she was recently treated for a UTI, yeast infection, and Chlamydia./)      HPI:  Ellen Cardenas is a 24 y.o. female presenting with c/o abdominal pain x1 day.  Patient reports that she took azithromycin last night for treatment of chlamydia in pregnancy and after taking azithromycin began having nausea, vomiting, and abdominal pain.  She reports that she threw up approximately 5 times last night.  She reports associated lower abdominal cramping that is bilateral.  Denies any associated vaginal bleeding.  She reports a history of 2 prior ectopic pregnancies, and states that abdominal cramping concerned her because of this.  She reports the cramping is coming and going, rates it a 6/10 currently.  Has not taken anything for pain.  Reports associated sweating with vomiting last night but denies any fevers.  Denies any urinary symptoms.  Reports it vaginal discharge is still present but overall improving.  She is recently completed treatment for asymptomatic bacteriuria present pregnancy and for yeast infection. LMP 3/19.    This is the extent to the patients complaints today here in the emergency department.    PMH:  As per HPI and below:  Past Medical History:   Diagnosis Date    Asthma     childhood    Constipation     Depression     Ectopic pregnancy      Past Surgical History:   Procedure Laterality Date    LAPAROSCOPIC SURGICAL REMOVAL OF ECTOPIC PREGNANCY      SALPINGECTOMY Left 12/7/2019    Procedure: SALPINGECTOMY;  Surgeon: Felecia Duran MD;  Location: [facility];  Service: OB/GYN;  Laterality: Left;       Social History     Tobacco Use    Smoking status: Never    Smokeless tobacco: Never  "  Substance Use Topics    Alcohol use: Yes     Comment: OCCASIONAL    Drug use: Yes     Types: Marijuana     Review of patient's allergies indicates:   Allergen Reactions    Latex, natural rubber Itching    Opioids - morphine analogues Hives       ROS: As per HPI and below:  Constitutional: No fever.  No chills.  Cardiovascular: No chest pain.  Respiratory: No shortness of breath.  GI: + abdominal pain.  Genitourinary: + vaginal discharge, no vaginal bleeding.  Neurologic: No headache.   MSK: no back pain     Physical Exam:    /77 (BP Location: Right arm, Patient Position: Sitting)   Pulse 88   Temp 98.8 °F (37.1 °C) (Oral)   Resp 18   Ht 5' 3" (1.6 m)   Wt 52.2 kg (115 lb)   SpO2 100%   BMI 20.37 kg/m²   Vitals:    04/25/24 1248 04/25/24 1507 04/25/24 1655   BP: 118/66  114/77   Pulse: 77  88   Resp: 18     Temp: 99 °F (37.2 °C) 99 °F (37.2 °C) 98.8 °F (37.1 °C)   TempSrc: Oral  Oral   SpO2: 100%  100%   Weight: 52.2 kg (115 lb)     Height: 5' 3" (1.6 m)         Nurse's notes vitals reviewed  Constitutional: Patient alert and oriented well-developed well-nourished  Eyes:  Normal conjunctiva.  Extraocular muscles are intact.  ENT: Oral mucosa moist  Chest:  Lungs clear to auscultation bilaterally  Cardiovascular:  Heart regular rate and rhythm.  GI: Abdomen soft, mild tenderness RLQ, no significant guarding, no rebound tenderness.   : Deferred.  Musculoskeletal: Normocephalic atraumatic, normal range of motion, no obvious deformities  Skin: Warm and dry no rashes or lesions, no ecchymosis, no erythema  Neuro: alert and oriented x3,  no focal neurological deficits.  Psych: Appropriate, conversant      Labs Reviewed   COMPREHENSIVE METABOLIC PANEL - Abnormal; Notable for the following components:       Result Value    Sodium 135 (*)     CO2 19 (*)     Alkaline Phosphatase 41 (*)     All other components within normal limits    Narrative:     Release to patient->Immediate   URINALYSIS, REFLEX TO URINE " CULTURE - Abnormal; Notable for the following components:    Ketones, UA 2+ (*)     All other components within normal limits    Narrative:     Specimen Source->Urine   POCT URINE PREGNANCY - Abnormal; Notable for the following components:    POC Preg Test, Ur Positive (*)     All other components within normal limits   HIV 1 / 2 ANTIBODY    Narrative:     Release to patient->Immediate   HEPATITIS C ANTIBODY    Narrative:     Release to patient->Immediate   CBC W/ AUTO DIFFERENTIAL    Narrative:     Release to patient->Immediate   HCG, QUANTITATIVE    Narrative:     Release to patient->Immediate       US OB <14 Wks TransAbd & TransVag, Single Gestation (XPD)   Final Result      Single intrauterine gestation, mean gestational sac diameter correlates with an estimated gestational age of 5 weeks 4 days.  No definite fetal pole at this time, examination is most consistent with intrauterine pregnancy of unknown viability.  Follow-up beta HCG and/or ultrasound is recommended until confirmation.         Electronically signed by: Chris Schulz MD   Date:    2024   Time:    16:07            Initial Impression/ Differential Dx:  Differential Diagnosis includes, but is not limited to:  Pregnancy complication (threatened AB, inevitable AB, incomplete Ab, missed AB, uterine rupture, ectopic pregnancy, placental abruption, placenta previa), ovarian cyst/torsion, STD, foreign body, trauma, normal menstruation      MDM:    Medical Decision Making  Urgent evaluation of 24-year-old  female presenting for evaluation of abdominal pain x1 day.  Associated vomiting, no associated vaginal bleeding.  On chart review, recent ultrasound  with no visible IUP.  HCG at that time 50.  Recent positive chlamydia test, no indication for repeat today given < 24 hours since patient took azithromycin and vaginal discharge is improving. Will check labs, hcg, repeat ultrasound today to rule out ectopic pregnancy given right sided  "tenderness, history of multiple ectopic pregnancies in the past.     Amount and/or Complexity of Data Reviewed  Labs:  Decision-making details documented in ED Course.    Risk  OTC drugs.  Prescription drug management.        ED Course as of 04/25/24 1847   Thu Apr 25, 2024   1433 hCG Qualitative, Urine(!): Positive [SW]   1515 CBC auto differential  No leukocytosis, no anemia.  Within normal limits [SW]   1520 Urinalysis, Reflex to Urine Culture Urine, Clean Catch(!)  No evidence of UTI, ketones consistent with dehydration from recent vomiting. [SW]   1543 Comprehensive metabolic panel(!)  Reviewed and unremarkable [SW]   1607 Beta HCG Quant: 57610 [SW]   1620 US results of "Single intrauterine gestation, mean gestational sac diameter correlates with an estimated gestational age of 5 weeks 4 days.  No definite fetal pole at this time, examination is most consistent with intrauterine pregnancy of unknown viability.  Follow-up beta HCG and/or ultrasound is recommended until confirmation." [SW]   1633 Spoke with Dr. Merino of OB/Gyn, who recommended repeat ultrasound and follow up at scheduled appointment May 6.  [SW]   1654 Discussed results with patient, recommended close follow up with OB gyn at appointment on May 6.  Suspect worsening pain to the right side is secondary to ovarian cyst noted on ultrasound.  Considered appendicitis, however patient with very mild tenderness, no leukocytosis, afebrile, think less likely at this time.  Discussed this with patient, who was in agreement and declined further workup, she was advised on strict ER return precautions, patient comfortable with discharge plan.  We will discharge with Zofran and Tylenol p.r.n. for nausea/pain.  We will give dose of azithromycin for patient to repeat at home using Zofran to ensure that she is able to keep it down to effectively treat chlamydia infection.  Recommend follow up with OB gyn to ensure infection has completely treated.  Was given " instructions on abstinence until both partners are tested and treated. [SW]      ED Course User Index  [SW] Sandra Velasquez PA-C       Diagnostic Impression:    1. Abdominal pain during pregnancy in first trimester    2. Nausea and vomiting, unspecified vomiting type         ED Disposition Condition    Discharge Stable            ED Prescriptions       Medication Sig Dispense Start Date End Date Auth. Provider    ondansetron (ZOFRAN-ODT) 4 MG TbDL Take 1 tablet (4 mg total) by mouth every 6 (six) hours as needed (for nausea). 20 tablet 4/25/2024 -- Sandra Velasquez PA-C    azithromycin (ZITHROMAX) 500 MG tablet (Expires today) Take 2 tablets (1,000 mg total) by mouth once daily. for 1 day 2 tablet 4/25/2024 4/26/2024 Sandra Velasquez PA-C          Follow-up Information       Follow up With Specialties Details Why Contact Info    Concepcion Melo MD Obstetrics, Obstetrics and Gynecology Go to  your scheduled appt [address]  [facility] [address]  [phone]      [facility] Emergency Medicine Go to  If symptoms worsen [address] [id number] [phone]             Sandra Velasquez PA-C  04/26/24 0036

## 2024-04-25 NOTE — DISCHARGE INSTRUCTIONS
You were seen in the ER for abdominal pain in pregnancy.  Your workup today was reassuring.  Your pregnancy is in the uterus, but it is uncertain viability, which means that you will need to have a follow up ultrasound to ensure that it is developing appropriately.  You may do this at your follow up appointment in May.  If you began having severe worsening right-sided pain, or if you began having severe vomiting or fever, please return to the ER immediately.  Return to the ER also if you begin soaking through 1 pad an hour or if you began having heavy bleeding with clots or with associated lightheadedness or syncope.  You may take Tylenol as needed for pain.  You may take Zofran as needed for nausea.  Please take repeat dose of azithromycin using Zofran for nausea to ensure that you were able to keep the medication down to fully treat chlamydia infection.  Please follow up with your OB for re-evaluation to ensure of the infection has resolved.

## 2024-04-25 NOTE — FIRST PROVIDER EVALUATION
Medical screening examination initiated.  I have conducted a focused provider triage encounter, findings are as follows:    Brief history of present illness:  Thinks she is experiencing a miscarriage. Pelvic cramping started last night. Reports symptom feels the same when she had a miscarriage in the past.   Reports associated nausea, vomiting and diarrhea. No vaginal bleeding. Recently diagnosed with yeast infection, UTI and chlamydia. Has been treated for everything. States her symptoms started after she took azithromycin. Has completed all antibiotics.     There were no vitals filed for this visit.    Pertinent physical exam: No focal abdominal or pelvic ttp.     Brief workup plan:  CBC, CMP, beta hCG, UA, US.     Preliminary workup initiated; this workup will be continued and followed by the physician or advanced practice provider that is assigned to the patient when roomed.

## 2024-04-26 ENCOUNTER — TELEPHONE (OUTPATIENT)
Dept: OBSTETRICS AND GYNECOLOGY | Facility: CLINIC | Age: 24
End: 2024-04-26
Payer: MEDICAID

## 2024-04-26 ENCOUNTER — NURSE TRIAGE (OUTPATIENT)
Dept: ADMINISTRATIVE | Facility: CLINIC | Age: 24
End: 2024-04-26
Payer: MEDICAID

## 2024-04-26 NOTE — TELEPHONE ENCOUNTER
F/u triage call     S/w pt   Informed she can try to Relieve diarrhea with Imodium® or Kaopectate®. Stay well hydrated. If you have blood or mucus in your stool or the diarrhea persists for more than 48 hours contact office

## 2024-04-26 NOTE — TELEPHONE ENCOUNTER
Pt has had about 7 episodes of diarrhea this morning, pt is 5 weeks pregnant. Recent antibiotic treatment for chlamydia. Pt denies abdominal pain or vomiting. Care advice call from MD and message routed with high priority. Also provided home care and instructions to call back with additional questions or worsening of symptoms. Pt verbalized understanding.   Reason for Disposition   Recent antibiotic therapy (i.e., within last 2 months) and diarrhea present > 3 days since antibiotic was stopped    Additional Information   Negative: Shock suspected (e.g., cold/pale/clammy skin, too weak to stand, low BP, rapid pulse)   Negative: Difficult to awaken or acting confused (e.g., disoriented, slurred speech)   Negative: Sounds like a life-threatening emergency to the triager   Negative: SEVERE abdominal pain (e.g., excruciating) and present > 1 hour   Negative: SEVERE abdominal pain and age > 60 years   Negative: Bloody, black, or tarry bowel movements  (Exception: Chronic-unchanged black-grey bowel movements and is taking iron pills or Pepto-Bismol.)   Negative: SEVERE diarrhea (e.g., 7 or more times / day more than normal) and age > 60 years   Negative: Constant abdominal pain lasting > 2 hours   Negative: Drinking very little and dehydration suspected (e.g., no urine > 12 hours, very dry mouth, very lightheaded)   Negative: Patient sounds very sick or weak to the triager   Negative: SEVERE diarrhea (e.g., 7 or more times / day more than normal) and present > 24 hours (1 day)   Negative: MODERATE diarrhea (e.g., 4-6 times / day more than normal) and present > 48 hours (2 days)   Negative: MODERATE diarrhea (e.g., 4-6 times / day more than normal) and age > 70 years   Negative: Abdominal pain (Exception: Pain clears completely with each passage of diarrhea stool.)   Negative: Fever > 101 F (38.3 C)   Negative: Blood in the stool  (Exception: Only on toilet paper. Reason: Diarrhea can cause rectal irritation with blood on  wiping.)   Negative: Mucus or pus in stool has been present > 2 days and diarrhea is more than mild   Negative: Weak immune system (e.g., HIV positive, cancer chemo, splenectomy, organ transplant, chronic steroids)   Negative: Travel to a foreign country in past month    Protocols used: Diarrhea-A-[place]

## 2024-05-06 ENCOUNTER — OFFICE VISIT (OUTPATIENT)
Dept: OBSTETRICS AND GYNECOLOGY | Facility: CLINIC | Age: 24
End: 2024-05-06
Payer: MEDICAID

## 2024-05-06 ENCOUNTER — HOSPITAL ENCOUNTER (OUTPATIENT)
Dept: PERINATAL CARE | Facility: OTHER | Age: 24
Discharge: HOME | End: 2024-05-06
Attending: OBSTETRICS & GYNECOLOGY
Payer: MEDICAID

## 2024-05-06 VITALS
HEIGHT: 63 IN | WEIGHT: 116.88 LBS | DIASTOLIC BLOOD PRESSURE: 52 MMHG | SYSTOLIC BLOOD PRESSURE: 106 MMHG | BODY MASS INDEX: 20.71 KG/M2

## 2024-05-06 DIAGNOSIS — N91.2 AMENORRHEA: ICD-10-CM

## 2024-05-06 DIAGNOSIS — Z34.80 SUPERVISION OF OTHER NORMAL PREGNANCY, ANTEPARTUM: Primary | ICD-10-CM

## 2024-05-06 PROCEDURE — 1159F MED LIST DOCD IN RCRD: CPT | Mod: CPTII,,, | Performed by: ADVANCED PRACTICE MIDWIFE

## 2024-05-06 PROCEDURE — 99999 PR PBB SHADOW E&M-EST. PATIENT-LVL III: CPT | Mod: PBBFAC,,, | Performed by: ADVANCED PRACTICE MIDWIFE

## 2024-05-06 PROCEDURE — 87491 CHLMYD TRACH DNA AMP PROBE: CPT | Performed by: ADVANCED PRACTICE MIDWIFE

## 2024-05-06 PROCEDURE — 3008F BODY MASS INDEX DOCD: CPT | Mod: CPTII,,, | Performed by: ADVANCED PRACTICE MIDWIFE

## 2024-05-06 PROCEDURE — 99213 OFFICE O/P EST LOW 20 MIN: CPT | Mod: S$PBB,TH,, | Performed by: ADVANCED PRACTICE MIDWIFE

## 2024-05-06 PROCEDURE — 76801 OB US < 14 WKS SINGLE FETUS: CPT | Mod: 26,,, | Performed by: OBSTETRICS & GYNECOLOGY

## 2024-05-06 PROCEDURE — 87086 URINE CULTURE/COLONY COUNT: CPT | Performed by: ADVANCED PRACTICE MIDWIFE

## 2024-05-06 PROCEDURE — 3078F DIAST BP <80 MM HG: CPT | Mod: CPTII,,, | Performed by: ADVANCED PRACTICE MIDWIFE

## 2024-05-06 PROCEDURE — 99213 OFFICE O/P EST LOW 20 MIN: CPT | Mod: PBBFAC,25,TH,PN | Performed by: ADVANCED PRACTICE MIDWIFE

## 2024-05-06 PROCEDURE — 3074F SYST BP LT 130 MM HG: CPT | Mod: CPTII,,, | Performed by: ADVANCED PRACTICE MIDWIFE

## 2024-05-06 PROCEDURE — 76801 OB US < 14 WKS SINGLE FETUS: CPT

## 2024-05-06 NOTE — PROGRESS NOTES
HISTORY OF PRESENT ILLNESS:    Ellen Cardenas is a 24 y.o. female, ,  Patient's last menstrual period was 2024.  for a routine exam complaining of amenorrhea.    Pt had a normal Pap 2023. Pt with hx ectopic x 2. L salpingotomy  and L ectopic in  managed with methotrexate.     Past Medical History:   Diagnosis Date    Asthma     childhood    Constipation     Depression     Ectopic pregnancy        Past Surgical History:   Procedure Laterality Date    LAPAROSCOPIC SURGICAL REMOVAL OF ECTOPIC PREGNANCY      SALPINGECTOMY Left 2019    Procedure: SALPINGECTOMY;  Surgeon: Felecia Duran MD;  Location: [facility];  Service: OB/GYN;  Laterality: Left;       MEDICATIONS AND ALLERGIES:      Current Outpatient Medications:     azithromycin (ZITHROMAX) 500 MG tablet, Take 1 gram (2 tablets) once (Patient not taking: Reported on 2024), Disp: 2 tablet, Rfl: 0    doxycycline (VIBRAMYCIN) 100 MG Cap, Take 100 mg by mouth 2 (two) times daily. (Patient not taking: Reported on 2024), Disp: , Rfl:     FLUoxetine 10 MG Tab, 1 tablet. (Patient not taking: Reported on 2024), Disp: , Rfl:     hydrOXYzine pamoate (VISTARIL) 25 MG Cap, Take 25 mg by mouth 3 (three) times daily as needed. (Patient not taking: Reported on 2024), Disp: , Rfl:     levonorgestreL (PLAN B) 1.5 mg Tab tablet, Take 1 tablet (1.5 mg total) by mouth once. for 1 dose, Disp: 1 tablet, Rfl: 0    metroNIDAZOLE (FLAGYL) 500 MG tablet, Take 500 mg by mouth 2 (two) times daily. (Patient not taking: Reported on 2024), Disp: , Rfl:     naproxen (NAPROSYN) 500 MG tablet, Take 1 tablet (500 mg total) by mouth 2 (two) times daily as needed (Pain). (Patient not taking: Reported on 2024), Disp: 20 tablet, Rfl: 0    ondansetron (ZOFRAN) 4 MG tablet, Take 8 mg by mouth 2 (two) times daily. (Patient not taking: Reported on 2024), Disp: , Rfl:     ondansetron (ZOFRAN-ODT) 4 MG TbDL, Take 1 tablet (4 mg total) by mouth every  6 (six) hours as needed (for nausea). (Patient not taking: Reported on 5/6/2024), Disp: 20 tablet, Rfl: 0    triamcinolone acetonide 0.1% (KENALOG) 0.1 % ointment, Apply 1 application topically 2 (two) times daily. (Patient not taking: Reported on 5/6/2024), Disp: , Rfl:     triamcinolone acetonide 0.1% (KENALOG) 0.1 % ointment, Apply topically 2 (two) times daily. (Patient not taking: Reported on 5/6/2024), Disp: , Rfl:     Review of patient's allergies indicates:   Allergen Reactions    Latex, natural rubber Itching    Opioids - morphine analogues Hives       Family History   Problem Relation Name Age of Onset    Breast cancer Neg Hx      Colon cancer Neg Hx      Ovarian cancer Neg Hx         Social History     Socioeconomic History    Marital status: Single   Tobacco Use    Smoking status: Never    Smokeless tobacco: Never   Substance and Sexual Activity    Alcohol use: Not Currently     Comment: OCCASIONAL    Drug use: Not Currently     Types: Marijuana    Sexual activity: Yes     Partners: Male     Social Determinants of Health     Financial Resource Strain: Medium Risk (11/8/2023)    Overall Financial Resource Strain (CARDIA)     Difficulty of Paying Living Expenses: Somewhat hard   Food Insecurity: Food Insecurity Present (11/8/2023)    Hunger Vital Sign     Worried About Running Out of Food in the Last Year: Sometimes true   Transportation Needs: Unknown (11/8/2023)    PRAPARE - Transportation     Lack of Transportation (Medical): No   Physical Activity: Sufficiently Active (11/8/2023)    Exercise Vital Sign     Days of Exercise per Week: 7 days     Minutes of Exercise per Session: 30 min   Stress: Stress Concern Present (11/8/2023)    [place] [employer] - Occupational Stress Questionnaire     Feeling of Stress : To some extent   Housing Stability: High Risk (11/8/2023)    Housing Stability Vital Sign     Unable to Pay for Housing in the Last Year: Yes     Unstable Housing in the Last  "Year: No       COMPREHENSIVE GYN HISTORY:  PAP History: Denies abnormal Paps.  Infection History: Denies STDs. Denies PID.  Benign History: Denies uterine fibroids. Denies ovarian cysts. Denies endometriosis. Denies other conditions.  Cancer History: Denies cervical cancer. Denies uterine cancer or hyperplasia. Denies ovarian cancer. Denies vulvar cancer or pre-cancer. Denies vaginal cancer or pre-cancer. Denies breast cancer. Denies colon cancer.  Sexual Activity History: Reports currently being sexually active  Menstrual History: None.  Contraception: None    ROS:  GENERAL: No weight changes. No swelling. No fatigue. No fever.  CARDIOVASCULAR: No chest pain. No shortness of breath. No leg cramps.   NEUROLOGICAL: No headaches. No vision changes.  BREASTS: No pain. No lumps. No discharge.  ABDOMEN: No pain. No nausea. No vomiting. No diarrhea. No constipation.  REPRODUCTIVE: No abnormal bleeding.   VULVA: No pain. No lesions. No itching.  VAGINA: No relaxation. No itching. No odor. No discharge. No lesions.  URINARY: No incontinence. No nocturia. No frequency. No dysuria.    BP (!) 106/52   Ht 5' 3" (1.6 m)   Wt 53 kg (116 lb 13.5 oz)   LMP 03/19/2024   BMI 20.70 kg/m²     PE:  AFFECT: Alert and oriented X 3. Interactive during exam  GENERAL: Appearance well-nourished, well-developed, in no acute distress.  HEENT: WNL  TEETH: Good dentition.  LUNGS: Easy and unlabored  HEART: Regular rate and rhythm     PROCEDURES:  UPT Positive    DIAGNOSIS:  Gyn exam  IUP with stated LMP of 03/19/24- dating scan today     PLAN:Routine prenatal care    MEDICATIONS PRESCRIBED:    LABS AND TESTS ORDERED:  [facility]      NEW PREGNANCY COUNSELING  Patient was counseled today on:  - Routine prenatal blood tests including HIV and anticipated course of prenatal care  - Prenatal vitamins and folic acid  - Weight gain, nutrition, and exercise  - Seafood and mercury  - Properly heating deli and prepared meats and avoiding " unrefrigerated deli  meats, cheeses, and milk products,   - Avoiding cat litter and raw meats due to risk of Toxoplasmosis precautions   - Accuracy of the LMP-based LOLIS and the value of an early TV-u/s  - Aneuploidy and neural tube screening -- cffDNA, sequential screening, and AFP screen at 15 weeks  - OTC medication in the first trimester  - Harmful effects of smoking, etOH, and recreational drugs  - M u/s  at 18-20 weeks.  - Common complaints of pregnancy  - Seat belt use  - Childbirth classes and hospital facilities  - All questions were answered    TERATOLOGY COUNSELING:   Discussed indications and options for aneuploidy screening - pamphlets given    - Pain and bleeding precautions given    - Return to clinic in 4 weeks    Alla Jewell CNM    OB/GYN    Total time of 20 minutes, including face-to-face time and non-face-to-face time preparing to see the patient (eg, review of tests), obtaining and/or reviewing separately obtained history, documenting clinical information in the electronic or other health record, independently interpreting results, communicating results to the patient/family/caregiver, or care coordination.

## 2024-05-07 LAB
BACTERIA UR CULT: NO GROWTH
C TRACH DNA SPEC QL NAA+PROBE: NOT DETECTED
N GONORRHOEA DNA SPEC QL NAA+PROBE: NOT DETECTED

## 2024-05-28 ENCOUNTER — TELEPHONE (OUTPATIENT)
Dept: OBSTETRICS AND GYNECOLOGY | Facility: CLINIC | Age: 24
End: 2024-05-28
Payer: MEDICAID

## 2024-05-28 ENCOUNTER — HOSPITAL ENCOUNTER (EMERGENCY)
Facility: OTHER | Age: 24
Discharge: HOME | End: 2024-05-28
Attending: EMERGENCY MEDICINE
Payer: MEDICAID

## 2024-05-28 VITALS
OXYGEN SATURATION: 99 % | WEIGHT: 124 LBS | DIASTOLIC BLOOD PRESSURE: 55 MMHG | SYSTOLIC BLOOD PRESSURE: 100 MMHG | HEART RATE: 93 BPM | RESPIRATION RATE: 18 BRPM | HEIGHT: 63 IN | BODY MASS INDEX: 21.97 KG/M2 | TEMPERATURE: 98 F

## 2024-05-28 DIAGNOSIS — O26.891 ABDOMINAL PAIN DURING PREGNANCY IN FIRST TRIMESTER: ICD-10-CM

## 2024-05-28 DIAGNOSIS — R10.9 ABDOMINAL PAIN DURING PREGNANCY IN FIRST TRIMESTER: ICD-10-CM

## 2024-05-28 DIAGNOSIS — O99.810 HYPERGLYCEMIA IN PREGNANCY: ICD-10-CM

## 2024-05-28 DIAGNOSIS — O20.9 VAGINAL BLEEDING IN PREGNANCY, FIRST TRIMESTER: ICD-10-CM

## 2024-05-28 DIAGNOSIS — N93.9 VAGINAL SPOTTING: Primary | ICD-10-CM

## 2024-05-28 LAB
ALBUMIN SERPL BCP-MCNC: 3.8 G/DL (ref 3.5–5.2)
ALP SERPL-CCNC: 28 U/L (ref 55–135)
ALT SERPL W/O P-5'-P-CCNC: 9 U/L (ref 10–44)
ANION GAP SERPL CALC-SCNC: 10 MMOL/L (ref 8–16)
AST SERPL-CCNC: 12 U/L (ref 10–40)
B-HCG UR QL: POSITIVE
BASOPHILS # BLD AUTO: 0.04 K/UL (ref 0–0.2)
BASOPHILS NFR BLD: 0.4 % (ref 0–1.9)
BILIRUB SERPL-MCNC: 0.4 MG/DL (ref 0.1–1)
BILIRUB UR QL STRIP: NEGATIVE
BUN SERPL-MCNC: 13 MG/DL (ref 6–20)
CALCIUM SERPL-MCNC: 9.1 MG/DL (ref 8.7–10.5)
CHLORIDE SERPL-SCNC: 105 MMOL/L (ref 95–110)
CLARITY UR: CLEAR
CO2 SERPL-SCNC: 18 MMOL/L (ref 23–29)
COLOR UR: YELLOW
CREAT SERPL-MCNC: 0.8 MG/DL (ref 0.5–1.4)
CTP QC/QA: YES
DIFFERENTIAL METHOD BLD: ABNORMAL
EOSINOPHIL # BLD AUTO: 0 K/UL (ref 0–0.5)
EOSINOPHIL NFR BLD: 0.1 % (ref 0–8)
ERYTHROCYTE [DISTWIDTH] IN BLOOD BY AUTOMATED COUNT: 12.8 % (ref 11.5–14.5)
EST. GFR  (NO RACE VARIABLE): >60 ML/MIN/1.73 M^2
GLUCOSE SERPL-MCNC: 164 MG/DL (ref 70–110)
GLUCOSE UR QL STRIP: ABNORMAL
HCG INTACT+B SERPL-ACNC: NORMAL MIU/ML
HCT VFR BLD AUTO: 34.6 % (ref 37–48.5)
HGB BLD-MCNC: 11.4 G/DL (ref 12–16)
HGB UR QL STRIP: NEGATIVE
IMM GRANULOCYTES # BLD AUTO: 0.02 K/UL (ref 0–0.04)
IMM GRANULOCYTES NFR BLD AUTO: 0.2 % (ref 0–0.5)
KETONES UR QL STRIP: ABNORMAL
LEUKOCYTE ESTERASE UR QL STRIP: NEGATIVE
LYMPHOCYTES # BLD AUTO: 1.4 K/UL (ref 1–4.8)
LYMPHOCYTES NFR BLD: 14.1 % (ref 18–48)
MCH RBC QN AUTO: 30.2 PG (ref 27–31)
MCHC RBC AUTO-ENTMCNC: 32.9 G/DL (ref 32–36)
MCV RBC AUTO: 92 FL (ref 82–98)
MONOCYTES # BLD AUTO: 0.5 K/UL (ref 0.3–1)
MONOCYTES NFR BLD: 5.3 % (ref 4–15)
NEUTROPHILS # BLD AUTO: 7.9 K/UL (ref 1.8–7.7)
NEUTROPHILS NFR BLD: 79.9 % (ref 38–73)
NITRITE UR QL STRIP: NEGATIVE
NRBC BLD-RTO: 0 /100 WBC
PH UR STRIP: 6 [PH] (ref 5–8)
PLATELET # BLD AUTO: 281 K/UL (ref 150–450)
PMV BLD AUTO: 9.2 FL (ref 9.2–12.9)
POTASSIUM SERPL-SCNC: 3.7 MMOL/L (ref 3.5–5.1)
PROT SERPL-MCNC: 6.8 G/DL (ref 6–8.4)
PROT UR QL STRIP: ABNORMAL
RBC # BLD AUTO: 3.78 M/UL (ref 4–5.4)
SODIUM SERPL-SCNC: 133 MMOL/L (ref 136–145)
SP GR UR STRIP: >1.03 (ref 1–1.03)
URN SPEC COLLECT METH UR: ABNORMAL
UROBILINOGEN UR STRIP-ACNC: NEGATIVE EU/DL
WBC # BLD AUTO: 9.87 K/UL (ref 3.9–12.7)

## 2024-05-28 PROCEDURE — 84702 CHORIONIC GONADOTROPIN TEST: CPT | Performed by: NURSE PRACTITIONER

## 2024-05-28 PROCEDURE — 85025 COMPLETE CBC W/AUTO DIFF WBC: CPT | Performed by: NURSE PRACTITIONER

## 2024-05-28 PROCEDURE — 81003 URINALYSIS AUTO W/O SCOPE: CPT | Performed by: NURSE PRACTITIONER

## 2024-05-28 PROCEDURE — 81025 URINE PREGNANCY TEST: CPT | Performed by: NURSE PRACTITIONER

## 2024-05-28 PROCEDURE — 25000003 PHARM REV CODE 250: Performed by: NURSE PRACTITIONER

## 2024-05-28 PROCEDURE — 80053 COMPREHEN METABOLIC PANEL: CPT | Performed by: NURSE PRACTITIONER

## 2024-05-28 RX ORDER — ACETAMINOPHEN 500 MG
1000 TABLET ORAL
Status: COMPLETED | OUTPATIENT
Start: 2024-05-28 | End: 2024-05-28

## 2024-05-28 RX ADMIN — ACETAMINOPHEN 1000 MG: 500 TABLET ORAL at 12:05

## 2024-05-28 NOTE — ED PROVIDER NOTES
"     Source of History:  Patient    Chief complaint:  Vaginal Bleeding (10 weeks pregnant and woke up to vaginal bleeding/spotting this am that has since stopped. C/o RLQ "soreness". Hx of ectopic pregnancy.  +IUP confirmed with ultrasound.)      HPI:  Ellen Cardenas is a 24 y.o. female G3 P 0 approximately 10 weeks pregnant presenting with vaginal spotting in pregnancy.  Patient has had 2 prior ectopics and a partial salpingectomy.  Patient woke up this morning with small amount of spotting with wiping and wanted to be evaluated. She also reports lower abdominal cramping that is intermittent ans she states that she attributed to round ligament pain. Patient has had confirmatory imaging and her OBGYN is Dr. Melo.  No dysuria or abnormal vaginal discharge. She did have intercourse last night.    This is the extent to the patients complaints today here in the emergency department.    ROS: As per HPI     Review of patient's allergies indicates:   Allergen Reactions    Latex, natural rubber Itching    Opioids - morphine analogues Hives       PMH:  As per HPI and below:  Past Medical History:   Diagnosis Date    Asthma     childhood    Constipation     Depression     Ectopic pregnancy      Past Surgical History:   Procedure Laterality Date    LAPAROSCOPIC SURGICAL REMOVAL OF ECTOPIC PREGNANCY      SALPINGECTOMY Left 12/7/2019    Procedure: SALPINGECTOMY;  Surgeon: Felecia Duran MD;  Location: [facility] OR;  Service: OB/GYN;  Laterality: Left;       Social History     Tobacco Use    Smoking status: Never    Smokeless tobacco: Never   Substance Use Topics    Alcohol use: Not Currently     Comment: OCCASIONAL    Drug use: Not Currently     Types: Marijuana       Physical Exam:    BP (!) 100/55   Pulse 93   Temp 98.1 °F (36.7 °C) (Oral)   Resp 18   Ht 5' 3" (1.6 m)   Wt 56.2 kg (124 lb)   LMP 03/19/2024   SpO2 99%   Breastfeeding No   BMI 21.97 kg/m²   Nurse's notes vitals reviewed  General: Patient alert and " oriented,well-developed, appears well-nourished  ENT: Oral mucosa moist,   Eyes: Pupils equally round and reactive to light, extraocular muscles are intact, normal conjunctiva, normal sclera  Cardiovascular: Regular rate   Respiratory: Respirations unlabored   GI: Soft nontender nondistended bowel sounds normal, no guarding, no rebound, non peritoneal  :  Exam done in the presence of chaperone Niesha RN.  External genitalia without lesions mass or tenderness.  Cervix is pink.  Os is closed.  No blood noted within the vaginal vault.  No abnormal discharge.  No CMT or adnexal tenderness or fullness on bimanual exam.  Musculoskeletal: Normocephalic atraumatic  Full strength and normal range of motion, no obvious deformities, no edema, normal cap refill  Skin: Warm and dry no rashes or lesions, no ecchymosis, no erythema  Neuro: Alert and oriented x3,  normal sensory, normal motor, no cerebellar deficits. Ambulates with a steady gait    Labs that have been ordered have been independently reviewed and interpreted by myself.        Initial Impression/ Differential Dx:  Urgent evaluation of female 24 y.o. presenting with vaginal bleeding in early pregnancy. Patient is afebrile, not toxic appearing and hemodynamically stable. Abdominal exam in benign. I suspect that this is likely postcoital spotting. She has had confirmatory imaging. Labwork ordered and in process from teletriage.  Will plan for pelvic exam and bedside ultrasound to assess FHT.    Differential Diagnosis includes, but is not limited to:  Pregnancy complication (threatened AB, inevitable AB, incomplete Ab, missed AB, uterine rupture,  placental abruption, placenta previa), ovarian cyst/torsion, STD, foreign body, trauma, normal menstruation.      MDM:    CBC with mild anemia, no leukocytosis.  CMP with hyperglycemia and pseudo hyponatremia, normal kidney function, no elevation of LFTs  No evidence of UTI on UA, however ketonuria and glycosuria noted  Beta  hCG appropriately elevated    On bedside ultrasound, fetal movement and FHT noted between 175-200 which is little higher than expected, however suspect this is secondary to dehydration given ketonuria.  Counseled patient to increase her oral intake of water.    Discussed with Dr. Edmond from OBGYN regarding hyperglycemia and glycosuria.  States that is likely due to patient having a meal carb heavy this morning.  She states okay to have patient follow-up with Dr. Melo with whom she has an appointment next week and discharged in good condition. Patient educated on signs and symptoms to monitor for and when to return to ED. Patient verbalized understanding agrees with treatment plan. All questions and concerns addressed.                    Diagnostic Impression:    1. Vaginal spotting    2. Hyperglycemia in pregnancy    3. Abdominal pain during pregnancy in first trimester    4. Vaginal bleeding in pregnancy, first trimester         ED Disposition Condition    Discharge Good            ED Prescriptions    None       Follow-up Information       Follow up With Specialties Details Why Contact Info    Concepcion Melo MD Obstetrics, Obstetrics and Gynecology  keep appt [address] [address]  [phone]               Alexi Delacruz NP  05/29/24 0052

## 2024-05-28 NOTE — ED NOTES
Bed: DISPO ROOM  Expected date:   Expected time:   Means of arrival: Personal Transportation  Comments:

## 2024-05-28 NOTE — TELEPHONE ENCOUNTER
----- Message from Alma Washington sent at 5/28/2024  1:19 PM CDT -----  Name of Who is calling :NICA HERRERA [[id number]]        What is the request in detailPt was in the er last  night and her sugar has dropped. Please assist         Can the clinic reply by [facility]:  no          What number to call back if not in [facility]:[phone]

## 2024-05-28 NOTE — Clinical Note
"Ellen Navarreteabiola Cardenas was seen and treated in our emergency department on 5/28/2024.  She may return to work on 05/28/2024.       If you have any questions or concerns, please don't hesitate to call.      Alexi Delacruz NP"

## 2024-05-28 NOTE — ED TRIAGE NOTES
[id number] (ectopic x 2, partial right salpingectomy) Reports light vaginal bleeding this morning when wiping with mild lower abdominal cramping. Bleeding has now stopped. States prior US showed IUP. States she has been having N/V with decreased oral intake. Presents awake, alert, oriented.

## 2024-05-28 NOTE — FIRST PROVIDER EVALUATION
" Emergency Department TeleTriage Encounter Note      CHIEF COMPLAINT    Chief Complaint   Patient presents with    Vaginal Bleeding     10 weeks pregnant and woke up to vaginal bleeding/spotting this am that has since stopped. C/o RLQ "soreness".       VITAL SIGNS   Initial Vitals [05/28/24 1137]   BP Pulse Resp Temp SpO2   (!) 100/55 93 18 98.1 °F (36.7 °C) 99 %      MAP       --            ALLERGIES    Review of patient's allergies indicates:   Allergen Reactions    Latex, natural rubber Itching    Opioids - morphine analogues Hives       PROVIDER TRIAGE NOTE  Verbal consent for the teletriage evaluation was given by the patient at the start of the evaluation.  All efforts will be made to maintain patient's privacy during the evaluation.      This is a teletriage evaluation of a 24 y.o. female presenting to the ED with c/o vaginal bleeding and abd cramping that started this AM.  Approximately 10 weeks pregnant, Dr. Kameron BUSTILLOS. O positive blood type. Limited physical exam via telehealth: The patient is awake, alert, answering questions appropriately and is not in respiratory distress.  As the Teletriage provider, I performed an initial assessment and ordered appropriate labs and imaging studies, if any, to facilitate the patient's care once placed in the ED. Once a room is available, care and a full evaluation will be completed by an alternate ED provider.  Any additional orders and the final disposition will be determined by that provider.  All imaging and labs will not be followed-up by the Teletriage Team, including myself.          ORDERS  Labs Reviewed - No data to display    ED Orders (720h ago, onward)      Start Ordered     Status Ordering Provider    05/28/24 1144 05/28/24 1143  Urinalysis, Reflex to Urine Culture Urine, Clean Catch  STAT         Ordered NATHALIE YEE    05/28/24 1143 05/28/24 1143  Insert peripheral IV  Once         Ordered NATHALIE YEE    05/28/24 1143 05/28/24 1143  CBC W/ AUTO " DIFFERENTIAL  STAT         Ordered NATHALIE YEE    05/28/24 1143 05/28/24 1143  Comp. Metabolic Panel  STAT         Ordered NATHALIE YEE    05/28/24 1143 05/28/24 1143  hCG, quantitative  STAT         Ordered NATHALIE YEE    05/28/24 1143 05/28/24 1143  POCT urine pregnancy  Once         Ordered NATHALIE YEE              Virtual Visit Note: The provider triage portion of this emergency department evaluation and documentation was performed via [employer], a HIPAA-compliant telemedicine application, in concert with a tele-presenter in the room. A face to face patient evaluation with one of my colleagues will occur once the patient is placed in an emergency department room.      DISCLAIMER: This note was prepared with [employer] voice recognition transcription software. Garbled syntax, mangled pronouns, and other bizarre constructions may be attributed to that software system.

## 2024-06-04 ENCOUNTER — LAB VISIT (OUTPATIENT)
Dept: LAB | Facility: OTHER | Age: 24
End: 2024-06-04
Attending: OBSTETRICS & GYNECOLOGY
Payer: MEDICAID

## 2024-06-04 ENCOUNTER — TELEPHONE (OUTPATIENT)
Dept: OBSTETRICS AND GYNECOLOGY | Facility: CLINIC | Age: 24
End: 2024-06-04

## 2024-06-04 ENCOUNTER — INITIAL PRENATAL (OUTPATIENT)
Dept: OBSTETRICS AND GYNECOLOGY | Facility: CLINIC | Age: 24
End: 2024-06-04
Attending: OBSTETRICS & GYNECOLOGY
Payer: MEDICAID

## 2024-06-04 ENCOUNTER — PATIENT MESSAGE (OUTPATIENT)
Dept: ADMINISTRATIVE | Facility: OTHER | Age: 24
End: 2024-06-04
Payer: MEDICAID

## 2024-06-04 VITALS
WEIGHT: 119.06 LBS | SYSTOLIC BLOOD PRESSURE: 100 MMHG | BODY MASS INDEX: 21.09 KG/M2 | DIASTOLIC BLOOD PRESSURE: 70 MMHG

## 2024-06-04 DIAGNOSIS — J45.909 MATERNAL ASTHMA COMPLICATING PREGNANCY: ICD-10-CM

## 2024-06-04 DIAGNOSIS — O09.91 SUPERVISION OF HIGH RISK PREGNANCY IN FIRST TRIMESTER: Primary | ICD-10-CM

## 2024-06-04 DIAGNOSIS — A60.09 GENITAL HERPES SIMPLEX VIRUS (HSV) INFECTION IN MOTHER AFFECTING PREGNANCY: ICD-10-CM

## 2024-06-04 DIAGNOSIS — O98.319 GENITAL HERPES SIMPLEX VIRUS (HSV) INFECTION IN MOTHER AFFECTING PREGNANCY: ICD-10-CM

## 2024-06-04 DIAGNOSIS — O09.91 SUPERVISION OF HIGH RISK PREGNANCY IN FIRST TRIMESTER: ICD-10-CM

## 2024-06-04 DIAGNOSIS — O99.519 MATERNAL ASTHMA COMPLICATING PREGNANCY: ICD-10-CM

## 2024-06-04 DIAGNOSIS — O99.341 MENTAL DISORDER AFFECTING PREGNANCY IN FIRST TRIMESTER: ICD-10-CM

## 2024-06-04 PROBLEM — N91.2 AMENORRHEA: Status: RESOLVED | Noted: 2024-05-06 | Resolved: 2024-06-04

## 2024-06-04 PROBLEM — Z87.59 HX OF ECTOPIC PREGNANCY: Status: RESOLVED | Noted: 2021-08-06 | Resolved: 2024-06-04

## 2024-06-04 PROBLEM — O09.90 PREGNANCY, SUPERVISION, HIGH-RISK: Status: ACTIVE | Noted: 2024-06-04

## 2024-06-04 PROBLEM — O99.340 MENTAL DISORDER AFFECTING PREGNANCY: Status: ACTIVE | Noted: 2024-06-04

## 2024-06-04 LAB
ABO + RH BLD: NORMAL
BASOPHILS # BLD AUTO: 0.03 K/UL (ref 0–0.2)
BASOPHILS NFR BLD: 0.3 % (ref 0–1.9)
BLD GP AB SCN CELLS X3 SERPL QL: NORMAL
DIFFERENTIAL METHOD BLD: ABNORMAL
EOSINOPHIL # BLD AUTO: 0.1 K/UL (ref 0–0.5)
EOSINOPHIL NFR BLD: 0.5 % (ref 0–8)
ERYTHROCYTE [DISTWIDTH] IN BLOOD BY AUTOMATED COUNT: 13.5 % (ref 11.5–14.5)
GLUCOSE SERPL-MCNC: 118 MG/DL (ref 70–140)
HBV SURFACE AG SERPL QL IA: NORMAL
HCT VFR BLD AUTO: 34.1 % (ref 37–48.5)
HCV AB SERPL QL IA: NEGATIVE
HGB BLD-MCNC: 11.1 G/DL (ref 12–16)
HIV 1+2 AB+HIV1 P24 AG SERPL QL IA: NEGATIVE
IMM GRANULOCYTES # BLD AUTO: 0.07 K/UL (ref 0–0.04)
IMM GRANULOCYTES NFR BLD AUTO: 0.6 % (ref 0–0.5)
LYMPHOCYTES # BLD AUTO: 1.6 K/UL (ref 1–4.8)
LYMPHOCYTES NFR BLD: 14.2 % (ref 18–48)
MCH RBC QN AUTO: 29.4 PG (ref 27–31)
MCHC RBC AUTO-ENTMCNC: 32.6 G/DL (ref 32–36)
MCV RBC AUTO: 90 FL (ref 82–98)
MONOCYTES # BLD AUTO: 0.6 K/UL (ref 0.3–1)
MONOCYTES NFR BLD: 5.3 % (ref 4–15)
NEUTROPHILS # BLD AUTO: 8.8 K/UL (ref 1.8–7.7)
NEUTROPHILS NFR BLD: 79.1 % (ref 38–73)
NRBC BLD-RTO: 0 /100 WBC
PLATELET # BLD AUTO: 278 K/UL (ref 150–450)
PMV BLD AUTO: 9.5 FL (ref 9.2–12.9)
RBC # BLD AUTO: 3.78 M/UL (ref 4–5.4)
SPECIMEN OUTDATE: NORMAL
TREPONEMA PALLIDUM IGG+IGM AB [PRESENCE] IN SERUM OR PLASMA BY IMMUNOASSAY: NONREACTIVE
WBC # BLD AUTO: 11.16 K/UL (ref 3.9–12.7)

## 2024-06-04 PROCEDURE — 86803 HEPATITIS C AB TEST: CPT

## 2024-06-04 PROCEDURE — 86762 RUBELLA ANTIBODY: CPT

## 2024-06-04 PROCEDURE — 87340 HEPATITIS B SURFACE AG IA: CPT

## 2024-06-04 PROCEDURE — 99213 OFFICE O/P EST LOW 20 MIN: CPT | Mod: PBBFAC,TH,PN,25 | Performed by: OBSTETRICS & GYNECOLOGY

## 2024-06-04 PROCEDURE — 86593 SYPHILIS TEST NON-TREP QUANT: CPT

## 2024-06-04 PROCEDURE — 82950 GLUCOSE TEST: CPT

## 2024-06-04 PROCEDURE — 85025 COMPLETE CBC W/AUTO DIFF WBC: CPT

## 2024-06-04 PROCEDURE — 87591 N.GONORRHOEAE DNA AMP PROB: CPT | Performed by: OBSTETRICS & GYNECOLOGY

## 2024-06-04 PROCEDURE — 87389 HIV-1 AG W/HIV-1&-2 AB AG IA: CPT

## 2024-06-04 PROCEDURE — 36415 COLL VENOUS BLD VENIPUNCTURE: CPT

## 2024-06-04 PROCEDURE — 86787 VARICELLA-ZOSTER ANTIBODY: CPT

## 2024-06-04 PROCEDURE — 86901 BLOOD TYPING SEROLOGIC RH(D): CPT

## 2024-06-04 PROCEDURE — 87491 CHLMYD TRACH DNA AMP PROBE: CPT | Performed by: OBSTETRICS & GYNECOLOGY

## 2024-06-04 PROCEDURE — 99999 PR PBB SHADOW E&M-EST. PATIENT-LVL III: CPT | Mod: PBBFAC,,, | Performed by: OBSTETRICS & GYNECOLOGY

## 2024-06-04 PROCEDURE — 87086 URINE CULTURE/COLONY COUNT: CPT

## 2024-06-04 PROCEDURE — 99213 OFFICE O/P EST LOW 20 MIN: CPT | Mod: TH,S$PBB,, | Performed by: OBSTETRICS & GYNECOLOGY

## 2024-06-04 RX ORDER — ONDANSETRON 4 MG/1
4 TABLET, ORALLY DISINTEGRATING ORAL EVERY 6 HOURS PRN
Qty: 20 TABLET | Refills: 0 | Status: SHIPPED | OUTPATIENT
Start: 2024-06-04

## 2024-06-04 RX ORDER — ALBUTEROL SULFATE 90 UG/1
AEROSOL, METERED RESPIRATORY (INHALATION)
COMMUNITY

## 2024-06-04 RX ORDER — OMEPRAZOLE 40 MG/1
40 CAPSULE, DELAYED RELEASE ORAL
COMMUNITY
Start: 2024-05-10 | End: 2024-06-04

## 2024-06-04 RX ORDER — TRAZODONE HYDROCHLORIDE 50 MG/1
50 TABLET ORAL NIGHTLY
COMMUNITY
Start: 2024-05-10 | End: 2024-06-04

## 2024-06-04 RX ORDER — MELOXICAM 7.5 MG/1
7.5 TABLET ORAL 2 TIMES DAILY
COMMUNITY
Start: 2024-05-10 | End: 2024-06-04

## 2024-06-04 RX ORDER — FLUOXETINE 10 MG/1
10 TABLET ORAL DAILY
Qty: 30 TABLET | Refills: 0 | Status: SHIPPED | OUTPATIENT
Start: 2024-06-04 | End: 2024-07-04

## 2024-06-04 RX ORDER — SUMATRIPTAN 50 MG/1
50 TABLET, FILM COATED ORAL
COMMUNITY
Start: 2024-05-10

## 2024-06-04 RX ORDER — TIZANIDINE 4 MG/1
4 TABLET ORAL 2 TIMES DAILY
COMMUNITY
Start: 2024-05-10 | End: 2024-06-04

## 2024-06-04 NOTE — PROGRESS NOTES
Pregnancy dating, labs, ultrasound reports, prenatal testing, and problem list; prior records and results; and available outside records were reviewed and updated in EMR.  Pertinent findings were noted below.    Reason for Visit   Initial Prenatal Visit    HPI   24 y.o., at 11w2d by Estimated Date of Delivery: 12/22/24    Patient is doing well today. She is reporting some intermittent nausea and vomiting x once daily. She reports that she is out of her zofran.     Contractions: No   Bleeding: No   Loss of fluid: No   Fetal movement: No   Nausea: Yes   Vomiting: Yes - patient reports that she is vomiting once daily while brushing her teeth   Headache: No     Exam   /70   Wt 54 kg (119 lb 0.8 oz)   LMP 03/19/2024   BMI 21.09 kg/m²     TWG: 3lb  GENERAL: No acute distress  ABD: Gravid  HFTs: 158 bpm    Assessment and Plan   Supervision of high risk pregnancy in first trimester  -     C. trachomatis/N. gonorrhoeae by AMP DNA  -     Connected MOM Enrollment  -     Assign Connected MOM Program Consent Questionnaire  -     US MFM Procedure ([facility])-Future; Future  -     ondansetron (ZOFRAN-ODT) 4 MG TbDL; Take 1 tablet (4 mg total) by mouth every 6 (six) hours as needed (for nausea).  Dispense: 20 tablet; Refill: 0  -     Type & Screen; Future; Expected date: 06/04/2024  -     CBC Auto Differential; Future; Expected date: 06/04/2024  -     HIV 1/2 Ag/Ab (4th Gen); Future; Expected date: 06/04/2024  -     Hepatitis B Surface Antigen; Future; Expected date: 06/04/2024  -     Hepatitis C Antibody; Future; Expected date: 06/04/2024  -     Rubella Antibody, IgG; Future; Expected date: 06/04/2024  -     Varicella Zoster Antibody, IgG; Future; Expected date: 06/04/2024  -     Treponema Pallidium Antibodies IgG, IgM; Future; Expected date: 06/04/2024  -     CULTURE, URINE  -     OB Glucose Screen; Future; Expected date: 06/04/2024    Genital herpes simplex virus (HSV) infection in mother affecting pregnancy    Mental  disorder affecting pregnancy in first trimester  -     FLUoxetine 10 MG Tab; Take 1 tablet (10 mg total) by mouth once daily.  Dispense: 30 tablet; Refill: 0    Maternal asthma complicating pregnancy        Refill for Zofran given today for nausea and vomiting in pregnancy  Patient was previously on fluoxetine, however she states that she stopped this 1 year ago. Patient is reporting continued anxiety and depression, therefore refill sent to pharmacy at this time  Patient denies SI/HI  Patient desires cfDNA testing at this time     labor precautions given  Follow-up: 4 weeks    Catherine Cavanaugh MD  Obstetrics and Gynecology - PGY1

## 2024-06-04 NOTE — TELEPHONE ENCOUNTER
----- Message from Drea Campos sent at 6/4/2024 10:55 AM CDT -----  Pt called wanted to know if she can get some prenatal pill called in she can be reached at [phone]

## 2024-06-05 ENCOUNTER — PATIENT MESSAGE (OUTPATIENT)
Dept: MATERNAL FETAL MEDICINE | Facility: CLINIC | Age: 24
End: 2024-06-05
Payer: MEDICAID

## 2024-06-05 LAB
C TRACH DNA SPEC QL NAA+PROBE: NOT DETECTED
N GONORRHOEA DNA SPEC QL NAA+PROBE: NOT DETECTED
RUBV IGG SER-ACNC: 45.4 IU/ML
RUBV IGG SER-IMP: REACTIVE
VARICELLA INTERPRETATION: POSITIVE
VARICELLA ZOSTER IGG: 549.7

## 2024-06-06 LAB — BACTERIA UR CULT: NORMAL

## 2024-06-10 ENCOUNTER — CLINICAL SUPPORT (OUTPATIENT)
Dept: PSYCHIATRY | Facility: CLINIC | Age: 24
End: 2024-06-10
Payer: MEDICAID

## 2024-06-10 ENCOUNTER — TELEPHONE (OUTPATIENT)
Dept: OBSTETRICS AND GYNECOLOGY | Facility: CLINIC | Age: 24
End: 2024-06-10
Payer: MEDICAID

## 2024-06-10 ENCOUNTER — PATIENT MESSAGE (OUTPATIENT)
Dept: PSYCHIATRY | Facility: CLINIC | Age: 24
End: 2024-06-10

## 2024-06-10 DIAGNOSIS — F33.1 MODERATE EPISODE OF RECURRENT MAJOR DEPRESSIVE DISORDER: Primary | ICD-10-CM

## 2024-06-10 DIAGNOSIS — F19.90 SUBSTANCE USE DISORDER: ICD-10-CM

## 2024-06-10 PROCEDURE — 99499 UNLISTED E&M SERVICE: CPT | Mod: AH,HB,95, | Performed by: PSYCHOLOGIST

## 2024-06-10 NOTE — TELEPHONE ENCOUNTER
----- Message from Yong Cardenas sent at 6/10/2024  9:28 AM CDT -----  Name of Who is Calling:NICA HERRERA [[id number]]                What is the request in detail: Pt calling because she looking for her M21 results.Please call back to further assist.                 Can the clinic reply by [facility]: No                What Number to Call Back if not in [facility]: [phone]

## 2024-06-10 NOTE — PROGRESS NOTES
Psychiatry Initial Visit (PhD/LCSW)  Diagnostic Interview - CPT 38700    Date: 6/10/2024    Site: Telemed in [place]     Referral source: Kirsty Llamas, PhD      Clinical status of patient: Outpatient    Ellen Cardenas, a 24 y.o. female, for initial evaluation visit.  Met with patient.    Chief complaint/reason for encounter: depression, mood elevation, anxiety, and interpersonal    History of present illness: Depression and anxiety since adolescence     Symptoms:   Mood: Endorsed depressed mood. Denied anhedonia and suicidal ideations/thoughts of death.  Anxiety: Endorsed excessive worry, difficulty controlling worrying, irritability, and racing thoughts.  Hodan/Hypomania: Denied  Psychosis: Denied    OB/GYN Status    Current or Most Recent Pregnancy: Patient is currently pregnant.    Current Pregnancy Status: 12 weeks     Pregnancy Desired?:  Patient reported that the pregnancy was not planned but it is desired .    Pregnancy Complications: None     Pregnancy Exposures: Patient reported alcohol and opioid exposure during the early stages of her pregnancy. She stated that the day she found out she was pregnant, she took an opioid. However, she noted that since then she hasn't taken any substances.       Pregnancy History    Pregnancy Status: Previous pregnancies: 2.  Live births: 0.  Miscarriages: 2 . Elective abortions: 0.     Infertility: Patient reported 1 ectopic pregnancy.    Miscarriage/Loss:  2          Mental Wellness    Support System: Patient reported limited support system. Noted that she has one friend, who is an ex partner, that she can count.     Nutrition: Patient reported that currently she is not eating a well balanced meal at least three times per day. Patient was counseled on the importance of diet in pregnancy/postpartum     Exercise: Currently patient is moderately active     Sleep: Denied concerns regarding sleep.      EPDS Score: 13       Psychiatric history: Patient reported a  "significant history of substance use disorder since 2013. She began with marijuana and then moved on to pain killers following the removal of her wisdom teeth. During the past year, she had been taking opioids every day following the loss of her fiance, who was murdered in her presence. The patient also reported a history of mental health treatment, including both inpatient and outpatient care. She was hospitalized due to a previous suicide attempt in 2014 and reported a second suicide attempt in 2020 but did not received treatment afterwards. Additionally, the patient was prescribed antidepressants in the past however, her mother never let her take them due to stigma.      Family history of psychiatric illness: Bipolar Disorder II and Depression.     Does patient have access to firearms? Yes  Comments: Patient owns a firearm.    Medical history: Noncontributory    Pain: 0     Social history (marriage, employment, etc.): Patient was born and raised in [facility]. Patient was raised by both parents and has 4 siblings and she is the youngest of them all. When asked about school, patient reported that she would skipped a lot of classes and would arrive intoxicated to school. She also shared that she didn't participated in extra curriculars or school activities like prom. After completing school, patient went to [employer] three different times to study, early childhood, criminal justice and radiography. 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 Currently, she resides alone however, she noted that her father and the father of the baby will sometimes stay with her at her home.    Description of a typical day: "I get up at 8 am and get ready for work. I arrive at my client home and will eat breakfast and chat with her, do a little cleaning and cooking after my shift is done I'll go to my grandmothers house and then I will return home and rest"    Current coping skills: "Taking a " "Tylenol PM and going to sleep"    Substance use: Patient denied current use.   Alcohol: Denied   Drugs: Denied   Tobacco: Denied   Caffeine: Patient reported drinking one cup of coffee per day.     Current medications and drug reactions (include OTC, herbal): see medication list     Strengths and liabilities: Strength: Patient accepts guidance/feedback, Strength: Patient is motivated for change., Strength: Patient has reasonable judgment., Strength: Patient is stable., Liability: Patient lacks coping skills.    Current Evaluation:     Mental Status Exam:  General Appearance:  unremarkable, age appropriate   Speech: normal tone, normal rate, normal pitch, normal volume      Level of Cooperation: cooperative      Thought Processes: normal and logical   Mood: euthymic      Thought Content: normal, no suicidality, no homicidality, delusions, or paranoia   Affect: congruent and appropriate   Orientation: Oriented x3   Memory: recent >  intact   Attention Span & Concentration: intact   Fund of General Knowledge: intact and appropriate to age and level of education   Judgment & Insight: good     Language  intact     Diagnostic Impression - Plan:       ICD-10-CM ICD-9-CM   1. Moderate episode of recurrent major depressive disorder  F33.1 296.32   2. Substance use disorder  F19.90 305.90     Patient-Stated Treatment Goals: "Learn more ways to manage my depression and my symptoms in order to be the best version of myself for my baby"    Plan:individual psychotherapy     Return to Clinic: 1 week    Length of Service (minutes): 60   "

## 2024-06-10 NOTE — TELEPHONE ENCOUNTER
----- Message from Nathalie Gandhi sent at 6/10/2024 11:03 AM CDT -----      Name of Who is Calling: NICA HERRERA [[id number]]      What is the request in detail: Pt called to ask if her gender results can be emailed to her or can she pick them up.Please contact to further discuss and advise.          Can the clinic reply by [facility]: Y      [facility]: [phone]

## 2024-06-10 NOTE — TELEPHONE ENCOUNTER
Caller:Patient    Doctor: Chip Hager    Reason for call: Patient is calling because she saw her MRI results in her chart. She does have a follow up scheduled but she wants to make sure she is or isnt doing anything to injury herself more based on the results and would like a callback to discuss what she should be doing.     Call back#: [phone] Pt requesting Mat 21 results    Returned negative  Consistent with Male    Gender given per pt request

## 2024-06-11 ENCOUNTER — PATIENT MESSAGE (OUTPATIENT)
Dept: OBSTETRICS AND GYNECOLOGY | Facility: CLINIC | Age: 24
End: 2024-06-11
Payer: MEDICAID

## 2024-06-17 ENCOUNTER — TELEPHONE (OUTPATIENT)
Dept: PSYCHIATRY | Facility: CLINIC | Age: 24
End: 2024-06-17
Payer: MEDICAID

## 2024-06-17 ENCOUNTER — ROUTINE PRENATAL (OUTPATIENT)
Dept: OBSTETRICS AND GYNECOLOGY | Facility: CLINIC | Age: 24
End: 2024-06-17
Payer: MEDICAID

## 2024-06-17 VITALS
SYSTOLIC BLOOD PRESSURE: 100 MMHG | BODY MASS INDEX: 21.95 KG/M2 | WEIGHT: 123.88 LBS | DIASTOLIC BLOOD PRESSURE: 62 MMHG

## 2024-06-17 DIAGNOSIS — O46.8X1 SUBCHORIONIC HEMATOMA IN FIRST TRIMESTER, SINGLE OR UNSPECIFIED FETUS: Primary | ICD-10-CM

## 2024-06-17 DIAGNOSIS — O41.8X10 SUBCHORIONIC HEMATOMA IN FIRST TRIMESTER, SINGLE OR UNSPECIFIED FETUS: Primary | ICD-10-CM

## 2024-06-17 PROCEDURE — 99999 PR PBB SHADOW E&M-EST. PATIENT-LVL II: CPT | Mod: PBBFAC,,, | Performed by: OBSTETRICS & GYNECOLOGY

## 2024-06-17 PROCEDURE — 99213 OFFICE O/P EST LOW 20 MIN: CPT | Mod: TH,S$PBB,, | Performed by: OBSTETRICS & GYNECOLOGY

## 2024-06-17 PROCEDURE — 99212 OFFICE O/P EST SF 10 MIN: CPT | Mod: PBBFAC,TH,PN | Performed by: OBSTETRICS & GYNECOLOGY

## 2024-06-17 NOTE — PROGRESS NOTES
"Pregnancy dating, labs, ultrasound reports, prenatal testing, and problem list; prior records and results; and available outside records were reviewed and updated in EMR.  Pertinent findings were noted below.    Reason for Visit   No chief complaint on file.    HPI   24 y.o., at 13w1d by Estimated Date of Delivery: 12/22/24    Recently went to ER with pelvic pain. US was obtained that showed small subchorionic hemorrhage and "no definite evidence of placenta previa is identified although placental margin appears near the internal os"    Cramping: Yes, level 4 out of 10   Bleeding: No   Vaginal discharge: No   Fetal movement: No   Nausea: No   Vomiting: No   Headache: Yes-resolves on own     Exam   /62   Wt 56.2 kg (123 lb 14.4 oz)   LMP 03/19/2024   BMI 21.95 kg/m²     GENERAL: No acute distress  ABD: Gravid < umbilicus  FHTs 150s, V-scan +fetal motion    Assessment and Plan   Subchorionic hematoma in first trimester, single or unspecified fetus      Reassured patient  Discussed miscarriage risk with KARSON  Pelvic rest until anatomy US 7/29 due to ?placenta previa on US at OSH    SAB precautions given  Follow-up: 4 weeks    "

## 2024-06-18 ENCOUNTER — TELEPHONE (OUTPATIENT)
Dept: PSYCHIATRY | Facility: CLINIC | Age: 24
End: 2024-06-18
Payer: MEDICAID

## 2024-06-18 NOTE — TELEPHONE ENCOUNTER
Called patient to verify if she was going to be able to attend todays follow up session. Patients phone appeared to be turned off and clinician left a voicemail

## 2024-06-24 ENCOUNTER — PATIENT MESSAGE (OUTPATIENT)
Dept: PSYCHIATRY | Facility: CLINIC | Age: 24
End: 2024-06-24

## 2024-06-24 ENCOUNTER — CLINICAL SUPPORT (OUTPATIENT)
Dept: PSYCHIATRY | Facility: CLINIC | Age: 24
End: 2024-06-24
Payer: MEDICAID

## 2024-06-24 DIAGNOSIS — Z71.9 ENCOUNTER FOR EDUCATION: Primary | ICD-10-CM

## 2024-06-24 PROCEDURE — 99499 UNLISTED E&M SERVICE: CPT | Mod: AH,HB,95, | Performed by: PSYCHOLOGIST

## 2024-06-24 NOTE — PROGRESS NOTES
Individual Psychotherapy (PhD/LCSW)     5/15/2024     Site:  [facility]          Therapeutic Intervention: Patient left virtual session without being seen.      Interval history and content of current session: Clinician reached out to the patient after she got disconnected from the session after joining. Patient didn't returned to the session. Clinician waited for 25 minutes in the session.     Length of Service (minutes): 0

## 2024-07-01 ENCOUNTER — CLINICAL SUPPORT (OUTPATIENT)
Dept: PSYCHIATRY | Facility: CLINIC | Age: 24
End: 2024-07-01
Payer: MEDICAID

## 2024-07-01 DIAGNOSIS — F33.1 MODERATE EPISODE OF RECURRENT MAJOR DEPRESSIVE DISORDER: Primary | ICD-10-CM

## 2024-07-01 DIAGNOSIS — F19.90 SUBSTANCE USE DISORDER: ICD-10-CM

## 2024-07-01 PROCEDURE — 99499 UNLISTED E&M SERVICE: CPT | Mod: AH,HB,95, | Performed by: PSYCHOLOGIST

## 2024-07-01 NOTE — PROGRESS NOTES
"Individual Psychotherapy (PhD/LCSW)    7/1/2024    Site:  Telemed         Therapeutic Intervention: Met with patient.  Outpatient - Insight oriented psychotherapy 60 min - CPT code 10858    Chief complaint/reason for encounter: depression and anxiety     Interval history and content of current session: Follow up session with Ms. Hughes. Follow up session began with a check in, a discussion of the treatment plan, and a review of the therapy goals. During the check in, Ms. Hughes stated she was "neutral for the most part" and reported that she recently broke up with the father of her child. She expressed that this decision was necessary to protect her mental health and to avoid exposing her unborn baby to negativity. She noted that the relationship felt forced, and despite her ex partners insistence on naming the baby after him, she refused, citing his lack of involvement in her pregnancy. Patients EPDS were 12. During the session, the clinician discussed the ARC model, CBT basics and the role of emotions. Patients response to the intervention was positive.    Treatment plan:  Target symptoms: depression, anxiety   Why chosen therapy is appropriate versus another modality: relevant to diagnosis, evidence based practice  Outcome monitoring methods: self-report, checklist/rating scale  Therapeutic intervention type: insight oriented psychotherapy, behavior modifying psychotherapy    Risk parameters:  Patient reports no suicidal ideation  Patient reports no homicidal ideation  Patient reports no self-injurious behavior  Patient reports no violent behavior    Verbal deficits: None    Patient's response to intervention:  The patient's response to intervention is accepting.    Progress toward goals and other mental status changes:  The patient's progress toward goals is good.    Mental Status Exam:  General Appearance:  unremarkable, age appropriate   Speech: normal tone, normal rate, normal pitch, normal volume    "   Level of Cooperation: cooperative      Thought Processes: normal and logical   Mood: euthymic      Thought Content: normal, no suicidality, no homicidality, delusions, or paranoia   Affect: congruent and appropriate   Orientation: Oriented x3   Attention Span & Concentration: intact   Judgment & Insight: good     Language  intact     Diagnosis:     ICD-10-CM ICD-9-CM   1. Moderate episode of recurrent major depressive disorder  F33.1 296.32   2. Substance use disorder  F19.90 305.90     Plan:  individual psychotherapy    Return to clinic: 1 week    Length of Service (minutes): 60

## 2024-07-02 ENCOUNTER — TELEPHONE (OUTPATIENT)
Dept: OBSTETRICS AND GYNECOLOGY | Facility: CLINIC | Age: 24
End: 2024-07-02
Payer: MEDICAID

## 2024-07-02 NOTE — TELEPHONE ENCOUNTER
----- Message from Nathalie Gandhi sent at 7/2/2024 11:19 AM CDT -----      Name of Who is Calling: NICA HERRERA [[id number]]      What is the request in detail: Pt called to get sooner appt.Please contact to further discuss and advise.          Can the clinic reply by [facility]: Y    What Number [facility] Back [facility]:  [phone]

## 2024-07-07 ENCOUNTER — PATIENT MESSAGE (OUTPATIENT)
Dept: OTHER | Facility: OTHER | Age: 24
End: 2024-07-07
Payer: MEDICAID

## 2024-07-11 ENCOUNTER — TELEPHONE (OUTPATIENT)
Dept: OBSTETRICS AND GYNECOLOGY | Facility: CLINIC | Age: 24
End: 2024-07-11
Payer: MEDICAID

## 2024-07-11 NOTE — TELEPHONE ENCOUNTER
----- Message from Alma Washington sent at 7/11/2024  1:55 PM CDT -----  Name of Who is calling :  NICA HERRERA [[id number]]      What is the request in detail:Pt would like a ultra sound scheduled for tomorrow  at the time of her visit. She said that's he went to the ER and they found some blood on the placenta . Please assist        Can the clinic reply by [facility]:no            What number to call back if not in [facility]: [phone]

## 2024-07-11 NOTE — TELEPHONE ENCOUNTER
Spoke with pt in regards to follow up U/S. Advised pt of providers note from previous visit following ER visit, pt wanted to see if an U/S could be possible tomorrow after visit. Advised pt that we will reach out to U/S dept on tomorrow while at visit to see and that will further discuss with provider on tomorrow. Reassured pt that findings on U/S at OSH that it passes. Pt verbalized understanding and thanked for the reassurance.

## 2024-07-12 ENCOUNTER — LAB VISIT (OUTPATIENT)
Dept: LAB | Facility: OTHER | Age: 24
End: 2024-07-12
Attending: ADVANCED PRACTICE MIDWIFE
Payer: MEDICAID

## 2024-07-12 ENCOUNTER — ROUTINE PRENATAL (OUTPATIENT)
Dept: OBSTETRICS AND GYNECOLOGY | Facility: CLINIC | Age: 24
End: 2024-07-12
Payer: MEDICAID

## 2024-07-12 VITALS
WEIGHT: 128.75 LBS | BODY MASS INDEX: 22.81 KG/M2 | SYSTOLIC BLOOD PRESSURE: 100 MMHG | DIASTOLIC BLOOD PRESSURE: 62 MMHG

## 2024-07-12 DIAGNOSIS — Z34.80 SUPERVISION OF OTHER NORMAL PREGNANCY, ANTEPARTUM: Primary | ICD-10-CM

## 2024-07-12 DIAGNOSIS — Z34.80 SUPERVISION OF OTHER NORMAL PREGNANCY, ANTEPARTUM: ICD-10-CM

## 2024-07-12 PROCEDURE — 99213 OFFICE O/P EST LOW 20 MIN: CPT | Mod: PBBFAC,TH,PN | Performed by: ADVANCED PRACTICE MIDWIFE

## 2024-07-12 PROCEDURE — 99999 PR PBB SHADOW E&M-EST. PATIENT-LVL III: CPT | Mod: PBBFAC,,, | Performed by: ADVANCED PRACTICE MIDWIFE

## 2024-07-12 PROCEDURE — 36415 COLL VENOUS BLD VENIPUNCTURE: CPT | Performed by: ADVANCED PRACTICE MIDWIFE

## 2024-07-12 PROCEDURE — 82105 ALPHA-FETOPROTEIN SERUM: CPT | Performed by: ADVANCED PRACTICE MIDWIFE

## 2024-07-14 ENCOUNTER — PATIENT MESSAGE (OUTPATIENT)
Dept: OTHER | Facility: OTHER | Age: 24
End: 2024-07-14
Payer: MEDICAID

## 2024-07-15 LAB
# FETUSES US: NORMAL
AFP INTERPRETATION: NORMAL
AFP MOM SERPL: 1.17
AFP SERPL-MCNC: 54 NG/ML
AFP SERPL-MCNC: NEGATIVE NG/ML
AGE AT DELIVERY: 24
GA (DAYS): 5 D
GA (WEEKS): 16 WK
GESTATIONAL AGE METHOD: NORMAL
IDDM PATIENT QL: NORMAL
SMOKING STATUS FTND: NO

## 2024-07-18 ENCOUNTER — HOSPITAL ENCOUNTER (EMERGENCY)
Facility: OTHER | Age: 24
Discharge: HOME | End: 2024-07-18
Attending: EMERGENCY MEDICINE
Payer: MEDICAID

## 2024-07-18 ENCOUNTER — PATIENT MESSAGE (OUTPATIENT)
Dept: PSYCHIATRY | Facility: CLINIC | Age: 24
End: 2024-07-18
Payer: MEDICAID

## 2024-07-18 VITALS
HEART RATE: 85 BPM | HEIGHT: 63 IN | OXYGEN SATURATION: 97 % | BODY MASS INDEX: 23.74 KG/M2 | TEMPERATURE: 98 F | WEIGHT: 134 LBS | DIASTOLIC BLOOD PRESSURE: 57 MMHG | SYSTOLIC BLOOD PRESSURE: 98 MMHG | RESPIRATION RATE: 18 BRPM

## 2024-07-18 DIAGNOSIS — R10.9 ABDOMINAL PAIN AFFECTING PREGNANCY: ICD-10-CM

## 2024-07-18 DIAGNOSIS — O26.899 ABDOMINAL PAIN AFFECTING PREGNANCY: ICD-10-CM

## 2024-07-18 DIAGNOSIS — J06.9 VIRAL URI: Primary | ICD-10-CM

## 2024-07-18 DIAGNOSIS — R11.0 NAUSEA: ICD-10-CM

## 2024-07-18 LAB
BACTERIA #/AREA URNS HPF: NORMAL /HPF
BILIRUB UR QL STRIP: NEGATIVE
CLARITY UR: CLEAR
COLOR UR: YELLOW
CTP QC/QA: YES
CTP QC/QA: YES
GLUCOSE UR QL STRIP: NEGATIVE
HGB UR QL STRIP: NEGATIVE
HYALINE CASTS #/AREA URNS LPF: 0 /LPF
KETONES UR QL STRIP: ABNORMAL
LEUKOCYTE ESTERASE UR QL STRIP: NEGATIVE
MICROSCOPIC COMMENT: NORMAL
NITRITE UR QL STRIP: NEGATIVE
PH UR STRIP: 7 [PH] (ref 5–8)
POC MOLECULAR INFLUENZA A AGN: NEGATIVE
POC MOLECULAR INFLUENZA B AGN: NEGATIVE
PROT UR QL STRIP: ABNORMAL
RBC #/AREA URNS HPF: 2 /HPF (ref 0–4)
SARS-COV-2 RDRP RESP QL NAA+PROBE: NEGATIVE
SP GR UR STRIP: >1.03 (ref 1–1.03)
SQUAMOUS #/AREA URNS HPF: 15 /HPF
URN SPEC COLLECT METH UR: ABNORMAL
UROBILINOGEN UR STRIP-ACNC: ABNORMAL EU/DL
WBC #/AREA URNS HPF: 5 /HPF (ref 0–5)

## 2024-07-18 PROCEDURE — 99284 EMERGENCY DEPT VISIT MOD MDM: CPT

## 2024-07-18 PROCEDURE — 87635 SARS-COV-2 COVID-19 AMP PRB: CPT | Performed by: PHYSICIAN ASSISTANT

## 2024-07-18 PROCEDURE — 81000 URINALYSIS NONAUTO W/SCOPE: CPT | Performed by: PHYSICIAN ASSISTANT

## 2024-07-18 RX ORDER — FLUTICASONE PROPIONATE 50 MCG
1 SPRAY, SUSPENSION (ML) NASAL 2 TIMES DAILY PRN
Qty: 15 G | Refills: 0 | Status: SHIPPED | OUTPATIENT
Start: 2024-07-18

## 2024-07-18 RX ORDER — PROMETHAZINE HYDROCHLORIDE 25 MG/1
25 TABLET ORAL EVERY 6 HOURS PRN
Qty: 15 TABLET | Refills: 0 | Status: SHIPPED | OUTPATIENT
Start: 2024-07-18

## 2024-07-18 RX ORDER — ACETAMINOPHEN 500 MG
1000 TABLET ORAL
Status: DISCONTINUED | OUTPATIENT
Start: 2024-07-18 | End: 2024-07-18 | Stop reason: HOSPADM

## 2024-07-18 RX ORDER — CETIRIZINE HYDROCHLORIDE 10 MG/1
10 TABLET ORAL DAILY
Qty: 30 TABLET | Refills: 0 | Status: SHIPPED | OUTPATIENT
Start: 2024-07-18 | End: 2025-07-18

## 2024-07-18 NOTE — ED PROVIDER NOTES
Source of History:  Patient and medical chart    Chief complaint:  Fall (Pt 16 wk pregnant. R lower side and abdominal pain after mechanical fall landing on R side 2 days ago, denies any vaginal bleeding. Pt seen in ED but states she is still experiencing pain and has not felt baby move) and General Illness (Pt also complaining of body aches, chills, diarrhea x a few days. Pt reports recent exposure to covid at work)      HPI:  Ellen Cardenas is a 24 y.o. female presenting with Multiple complaints.  Patient currently pregnant at 17 weeks 4 days gestation.  She states that she would mechanical fall from her bed few days ago.  She was seen at outside ER where she had formal ultrasound at that time with no acute abnormalities.  She states since this time she continues with some right-sided abdominal pain and has felt decreased fetal movement which she was reporting feeling prior to this.     Patient also reports some general illness symptoms including nasal congestion, nausea, fatigue and mild headache.  She has tried Tylenol with some improvement.  She reports exposure to COVID at work.    This is the extent to the patients complaints today here in the emergency department.    ROS: As per HPI     Review of patient's allergies indicates:   Allergen Reactions    Latex, natural rubber Itching    Opioids - morphine analogues Hives       PMH:  As per HPI and below:  Past Medical History:   Diagnosis Date    Asthma     childhood    Constipation     Depression     Ectopic pregnancy      Past Surgical History:   Procedure Laterality Date    LAPAROSCOPIC SURGICAL REMOVAL OF ECTOPIC PREGNANCY      SALPINGECTOMY Left 12/7/2019    Procedure: SALPINGECTOMY;  Surgeon: Felecia Duran MD;  Location: [facility] OR;  Service: OB/GYN;  Laterality: Left;       Social History     Tobacco Use    Smoking status: Never    Smokeless tobacco: Never   Substance Use Topics    Alcohol use: Not Currently     Comment: OCCASIONAL    Drug use:  "Not Currently     Types: Marijuana       Physical Exam:    /62   Pulse 79   Temp 98.6 °F (37 °C) (Oral)   Resp 20   Ht 5' 3" (1.6 m)   Wt 60.8 kg (134 lb)   LMP 03/19/2024   SpO2 100%   Breastfeeding No   BMI 23.74 kg/m²   Nursing note and vital signs reviewed.  Constitutional: No acute distress.  Nontoxic  Eyes: No conjunctival injection.Extraocular muscles are intact.  ENT: Oropharynx clear.  Normal phonation.   Cardiovascular: Regular rate and rhythm.  No murmurs. No gallops. No rubs  Respiratory: Clear to auscultation bilaterally.  Good air movement.  No wheezes.  No rhonchi. No rales. No accessory muscle use..  Abdomen:  Gravid abdomen.  Fundus just below umbilicus.  No focal tenderness on exam.  No guarding.  No contusion  Musculoskeletal: Good range of motion all joints.  No deformities.  Neck supple.  No meningismus.  Skin: No rashes seen.  Good turgor.  No abrasions.  No ecchymoses.  Neurologic: Motor intact.  Sensation intact.  No ataxia. No focal neurological deficits.  Psych: Appropriate, conversant    Labs that have been ordered have been independently reviewed and interpreted by myself.    I decided to obtain the patient's medical records.    MDM/ Differential Dx:    Ellen Cardenas 24 y.o. presented to the ED with c/o abdominal pain in general illness.  Physical exam reveals well-appearing female in no distress.  Exam grossly unremarkable    DDX:  Contusion, strain, COVID, influenza, general illness, nausea related to pregnancy, UTI, placenta abruption    ED management:  Reviewed recent ER visit with reassuring formal ultrasound.  Bedside was placed which once again demonstrates good fetal movement and fetal heart rate activity.  Patient felt reassured to see this reported she was feeling fetal movement after this.  COVID and flu were negative.  She remains well-appearing with low suspicion of systemic infection.  Discuss with OB hospitalist who agrees with this workup instructed " patient to return for any new or worsening symptoms and low suspicion of abruption given timeline from injury with no bleeding, leaking or additional complaints    Impression/Plan: Patient informed of diagnosis The primary encounter diagnosis was Viral URI. Diagnoses of Nausea and Abdominal pain affecting pregnancy were also pertinent to this visit. Patient will follow up with Primary.  Patient cautioned on when to return to ED.  Pt. Understands and agrees with current treatment plan      Results for orders placed or performed during the hospital encounter of 07/18/24   Urinalysis, Reflex to Urine Culture Urine, Clean Catch    Specimen: Urine, Clean Catch   Result Value Ref Range    Specimen UA Urine, Clean Catch     Color, UA Yellow Yellow, Straw, Jennifer    Appearance, UA Clear Clear    pH, UA 7.0 5.0 - 8.0    Specific Gravity, UA >1.030 (A) 1.005 - 1.030    Protein, UA 1+ (A) Negative    Glucose, UA Negative Negative    Ketones, UA Trace (A) Negative    Bilirubin (UA) Negative Negative    Occult Blood UA Negative Negative    Nitrite, UA Negative Negative    Urobilinogen, UA 2.0-3.0 (A) <2.0 EU/dL    Leukocytes, UA Negative Negative   Urinalysis Microscopic   Result Value Ref Range    RBC, UA 2 0 - 4 /hpf    WBC, UA 5 0 - 5 /hpf    Bacteria Occasional None-Occ /hpf    Squam Epithel, UA 15 /hpf    Hyaline Casts, UA 0 0-1/lpf /lpf    Microscopic Comment SEE COMMENT    POCT Influenza A/B Molecular   Result Value Ref Range    POC Molecular Influenza A Ag Negative Negative    POC Molecular Influenza B Ag Negative Negative     Acceptable Yes    POCT COVID-19 Rapid Screening   Result Value Ref Range    POC Rapid COVID Negative Negative     Acceptable Yes      Imaging Results    None                   Diagnostic Impression:    1. Viral URI    2. Nausea    3. Abdominal pain affecting pregnancy         ED Disposition Condition    Discharge Stable            ED Prescriptions       Medication Sig  Dispense Start Date End Date Auth. Provider    promethazine (PHENERGAN) 25 MG tablet Take 1 tablet (25 mg total) by mouth every 6 (six) hours as needed for Nausea. 15 tablet 7/18/2024 -- Muna Alex PA    cetirizine (ZYRTEC) 10 MG tablet Take 1 tablet (10 mg total) by mouth once daily. 30 tablet 7/18/2024 7/18/2025 Muna Alex PA    fluticasone propionate (FLONASE) 50 mcg/actuation nasal spray 1 spray (50 mcg total) by Each Nostril route 2 (two) times daily as needed for Rhinitis. 15 g 7/18/2024 -- Muna Alex PA          Follow-up Information       Follow up With Specialties Details Why Contact Alla Ritchie CNM Obstetrics Schedule an appointment as soon as possible for a visit   [address] [facility] [address]  [phone]               Muna Alex PA  07/18/24 8811

## 2024-07-18 NOTE — Clinical Note
"Ellen"Zehra Cardenas was seen and treated in our emergency department on 7/18/2024.  She may return to work on 07/19/2024.       If you have any questions or concerns, please don't hesitate to call.      Muna Alex PA"

## 2024-07-18 NOTE — ED TRIAGE NOTES
Pt is 17 weeks pregnant and reports a fall on Monday resulting in R abdominal pain. Pt is concerned because she has noticed decreased fetal movement. Pt denies vaginal bleeding, abnormal discharge or pain. Pt also complains of N/V, headaches, stuffy and diarrhea. 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 Pt has taken tylenol for pain and rates pain as 03/10 at this time.

## 2024-07-22 ENCOUNTER — TELEPHONE (OUTPATIENT)
Dept: OBSTETRICS AND GYNECOLOGY | Facility: CLINIC | Age: 24
End: 2024-07-22
Payer: MEDICAID

## 2024-07-22 NOTE — TELEPHONE ENCOUNTER
----- Message from Nathalie Gandhi sent at 7/22/2024  2:53 PM CDT -----      Name of Who is Calling: NICA HERRERA [[id number]]      What is the request in detail: Pt called to confirm if fax was received from insurance company.Please contact to further discuss and advise.          Can the clinic reply by [facility]: Y      What Number to Call Back [facility] not in [facility]: [phone]

## 2024-07-26 ENCOUNTER — TELEPHONE (OUTPATIENT)
Dept: OBSTETRICS AND GYNECOLOGY | Facility: CLINIC | Age: 24
End: 2024-07-26
Payer: MEDICAID

## 2024-07-26 NOTE — TELEPHONE ENCOUNTER
----- Message from Alma Washington sent at 7/26/2024 12:05 PM CDT -----  Name of Who is calling :NICA HERRERA [[id number]]        What is the request in detail:  Pt stated that her nipples are peel ling and may need some nipple cream. Pt is also waiting on her breast pump to be called in Please assist       Can the clinic reply by [facility]:no            What number to call back if not in [facility]: [phone]

## 2024-07-29 ENCOUNTER — PROCEDURE VISIT (OUTPATIENT)
Dept: MATERNAL FETAL MEDICINE | Facility: CLINIC | Age: 24
End: 2024-07-29
Payer: MEDICAID

## 2024-07-29 ENCOUNTER — ROUTINE PRENATAL (OUTPATIENT)
Dept: OBSTETRICS AND GYNECOLOGY | Facility: CLINIC | Age: 24
End: 2024-07-29
Attending: OBSTETRICS & GYNECOLOGY
Payer: MEDICAID

## 2024-07-29 VITALS
SYSTOLIC BLOOD PRESSURE: 104 MMHG | BODY MASS INDEX: 23.04 KG/M2 | WEIGHT: 130.06 LBS | DIASTOLIC BLOOD PRESSURE: 68 MMHG

## 2024-07-29 DIAGNOSIS — O99.342 MENTAL DISORDER AFFECTING PREGNANCY IN SECOND TRIMESTER: ICD-10-CM

## 2024-07-29 DIAGNOSIS — O09.91 SUPERVISION OF HIGH RISK PREGNANCY IN FIRST TRIMESTER: ICD-10-CM

## 2024-07-29 DIAGNOSIS — Z36.2 ENCOUNTER FOR FOLLOW-UP ULTRASOUND OF FETAL ANATOMY: Primary | ICD-10-CM

## 2024-07-29 DIAGNOSIS — O09.92 SUPERVISION OF HIGH RISK PREGNANCY IN SECOND TRIMESTER: Primary | ICD-10-CM

## 2024-07-29 PROCEDURE — 99213 OFFICE O/P EST LOW 20 MIN: CPT | Mod: TH,S$PBB,, | Performed by: OBSTETRICS & GYNECOLOGY

## 2024-07-29 PROCEDURE — 99212 OFFICE O/P EST SF 10 MIN: CPT | Mod: PBBFAC,TH,25 | Performed by: OBSTETRICS & GYNECOLOGY

## 2024-07-29 PROCEDURE — 99999 PR PBB SHADOW E&M-EST. PATIENT-LVL II: CPT | Mod: PBBFAC,,, | Performed by: OBSTETRICS & GYNECOLOGY

## 2024-07-29 PROCEDURE — 76805 OB US >/= 14 WKS SNGL FETUS: CPT | Mod: PBBFAC | Performed by: OBSTETRICS & GYNECOLOGY

## 2024-07-29 NOTE — PROGRESS NOTES
Pregnancy dating, labs, ultrasound reports, prenatal testing, and problem list; prior records and results; and available outside records were reviewed and updated in EMR.  Pertinent findings were noted below.    Reason for Visit   Routine Prenatal Visit    HPI   24 y.o., at 19w1d by Estimated Date of Delivery: 12/22/24    Patient doing well today. No complaints.    Cramping: No   Bleeding: No   Vaginal discharge: No   Fetal movement: Yes   Nausea: Some, patient able to tolerate meals.    Vomiting: No   Headache: No     Exam   /68   Wt 59 kg (130 lb 1.1 oz)   LMP 03/19/2024   BMI 23.04 kg/m²     GENERAL: No acute distress  ABD: Gravid < umbilicus    Assessment and Plan   Supervision of high risk pregnancy in second trimester  -     OB Glucose Screen; Future; Expected date: 07/29/2024  -     CBC W/ AUTO DIFFERENTIAL; Future; Expected date: 07/29/2024    Mental disorder affecting pregnancy in second trimester      19 weeks - Came from anatomy today. F/u scheduled as was incomplete today. Gtt at 24 wks w/ CBC.  Anxiety/Depression - Patient no longer taking Fluoxetine or seeing counselor. Recommended patient try to reestablish with counselor and if she feels she needs to restart medication to let us know.     SAB precautions given  Follow-up: 4 weeks     Sada Daniels MD  Obstetrics and Gynecology, PGY-2

## 2024-07-31 ENCOUNTER — HOSPITAL ENCOUNTER (EMERGENCY)
Facility: OTHER | Age: 24
Discharge: HOME | End: 2024-07-31
Attending: OBSTETRICS & GYNECOLOGY
Payer: MEDICAID

## 2024-07-31 VITALS
OXYGEN SATURATION: 99 % | HEART RATE: 85 BPM | DIASTOLIC BLOOD PRESSURE: 59 MMHG | TEMPERATURE: 98 F | RESPIRATION RATE: 16 BRPM | SYSTOLIC BLOOD PRESSURE: 105 MMHG

## 2024-07-31 DIAGNOSIS — R10.9 ABDOMINAL CRAMPING: Primary | ICD-10-CM

## 2024-07-31 DIAGNOSIS — Z3A.18 18 WEEKS GESTATION OF PREGNANCY: ICD-10-CM

## 2024-07-31 LAB
BILIRUB SERPL-MCNC: NORMAL MG/DL
BLOOD URINE, POC: NORMAL
COLOR, POC UA: YELLOW
GLUCOSE UR QL STRIP: NORMAL
KETONES UR QL STRIP: NORMAL
LEUKOCYTE ESTERASE URINE, POC: NORMAL
NITRITE, POC UA: NORMAL
PH, POC UA: 5
PROTEIN, POC: NORMAL
SPECIFIC GRAVITY, POC UA: 1.02
UROBILINOGEN, POC UA: NORMAL

## 2024-07-31 PROCEDURE — 99283 EMERGENCY DEPT VISIT LOW MDM: CPT | Mod: ,,, | Performed by: OBSTETRICS & GYNECOLOGY

## 2024-07-31 PROCEDURE — 25000003 PHARM REV CODE 250

## 2024-07-31 PROCEDURE — 99284 EMERGENCY DEPT VISIT MOD MDM: CPT

## 2024-07-31 PROCEDURE — 81002 URINALYSIS NONAUTO W/O SCOPE: CPT

## 2024-07-31 RX ORDER — ACETAMINOPHEN 500 MG
1000 TABLET ORAL ONCE
Status: COMPLETED | OUTPATIENT
Start: 2024-07-31 | End: 2024-07-31

## 2024-07-31 RX ADMIN — ACETAMINOPHEN 1000 MG: 500 TABLET ORAL at 08:07

## 2024-08-02 ENCOUNTER — TELEPHONE (OUTPATIENT)
Dept: OBSTETRICS AND GYNECOLOGY | Facility: CLINIC | Age: 24
End: 2024-08-02
Payer: MEDICAID

## 2024-08-02 ENCOUNTER — NURSE TRIAGE (OUTPATIENT)
Dept: ADMINISTRATIVE | Facility: CLINIC | Age: 24
End: 2024-08-02
Payer: MEDICAID

## 2024-08-02 NOTE — TELEPHONE ENCOUNTER
OOC RN AKI Bailey  Patient calling and is pregnant, 20   Her family all have colds and   congestion cough no taste,  starting yesterday. Denies fever, not sure. History of Asthma.  Taking old asthma medicine.   Took Theraflu for colds,  wants to know if that was safe.  Care advise to see today in office.  For any new or worsening symptoms to callback OOC RN.  Patient wants to know what she can take for cough.    Advised against meds with D.  SC .   If no appts, go to UC/ED  Patient VU  Reason for Disposition   Sinus pain (not just congestion) and fever    Additional Information   Negative: SEVERE difficulty breathing (e.g., struggling for each breath, speaks in single words)   Negative: Very weak (can't stand)   Negative: Sounds like a life-threatening emergency to the triager   Negative: Patient sounds very sick or weak to the triager   Negative: Fever > 103 F (39.4 C)   Negative: Fever > 101 F (38.3 C) and over 60 years of age   Negative: Fever > 100.0 F (37.8 C) and has diabetes mellitus or a weak immune system (e.g., HIV positive, cancer chemotherapy, organ transplant, splenectomy, chronic steroids)   Negative: Fever > 100.0 F (37.8 C) and bedridden (e.g., CVA, chronic illness, recovering from surgery)   Negative: Fever present > 3 days (72 hours)   Negative: Fever returns after gone for over 24 hours and symptoms worse or not improved    Protocols used: Common Cold-A-OH

## 2024-08-02 NOTE — TELEPHONE ENCOUNTER
----- Message from Yong Cardenas sent at 8/2/2024 11:00 AM CDT -----  Name of Who is Calling:NICA HERRERA [[id number]]                What is the request in detail: Pt calling because she is sick and want to know what kind of medication she can take, states she is in a lot of pain.Please call back to further assist.        States she have off brand  theraflu, she hasn't taken it but want to know if that's okay         Can the clinic reply by [facility]: No                What Number to Call Back if not in [facility]: [phone]

## 2024-08-02 NOTE — TELEPHONE ENCOUNTER
Colds and Congestion    Nasal congestion during pregnancy can be normal. To relieve congestion, you may use Ocean Nasal Spray, a saline nasal spray, or Sudafed sparingly. Do not use antihistamines because they may make the congestion worse. You can also try using a humidifier.    Cough  For relief from cough, you can try regular strength Robitussin, Chlor aseptic®  spray or any throat lozenges.   Also increase fluids.   Please follow up if symptoms become worst.

## 2024-08-04 ENCOUNTER — TELEPHONE (OUTPATIENT)
Dept: OBSTETRICS AND GYNECOLOGY | Facility: OTHER | Age: 24
End: 2024-08-04
Payer: MEDICAID

## 2024-08-04 ENCOUNTER — PATIENT MESSAGE (OUTPATIENT)
Dept: OTHER | Facility: OTHER | Age: 24
End: 2024-08-04
Payer: MEDICAID

## 2024-08-04 ENCOUNTER — HOSPITAL ENCOUNTER (EMERGENCY)
Facility: OTHER | Age: 24
Discharge: HOME | End: 2024-08-04
Attending: OBSTETRICS & GYNECOLOGY
Payer: MEDICAID

## 2024-08-04 VITALS
BODY MASS INDEX: 23.04 KG/M2 | RESPIRATION RATE: 16 BRPM | TEMPERATURE: 99 F | DIASTOLIC BLOOD PRESSURE: 56 MMHG | HEART RATE: 85 BPM | WEIGHT: 130.06 LBS | OXYGEN SATURATION: 99 % | HEIGHT: 63 IN | SYSTOLIC BLOOD PRESSURE: 101 MMHG

## 2024-08-04 DIAGNOSIS — O26.899 ABDOMINAL PAIN AFFECTING PREGNANCY: Primary | ICD-10-CM

## 2024-08-04 DIAGNOSIS — Z3A.20 20 WEEKS GESTATION OF PREGNANCY: ICD-10-CM

## 2024-08-04 DIAGNOSIS — N89.8 VAGINAL DISCHARGE DURING PREGNANCY IN SECOND TRIMESTER: ICD-10-CM

## 2024-08-04 DIAGNOSIS — R10.9 ABDOMINAL PAIN AFFECTING PREGNANCY: Primary | ICD-10-CM

## 2024-08-04 DIAGNOSIS — O26.892 VAGINAL DISCHARGE DURING PREGNANCY IN SECOND TRIMESTER: ICD-10-CM

## 2024-08-04 LAB
BILIRUB SERPL-MCNC: NORMAL MG/DL
BLOOD URINE, POC: NORMAL
COLOR, POC UA: YELLOW
GLUCOSE UR QL STRIP: NORMAL
GROUP A STREP, MOLECULAR: NEGATIVE
INFLUENZA A, MOLECULAR: NEGATIVE
INFLUENZA B, MOLECULAR: NEGATIVE
KETONES UR QL STRIP: NORMAL
LEUKOCYTE ESTERASE URINE, POC: NORMAL
NITRITE, POC UA: NORMAL
PH, POC UA: 8
PROTEIN, POC: NORMAL
SARS-COV-2 RDRP RESP QL NAA+PROBE: NEGATIVE
SPECIFIC GRAVITY, POC UA: 1.01
SPECIMEN SOURCE: NORMAL
UROBILINOGEN, POC UA: NORMAL

## 2024-08-04 PROCEDURE — 87591 N.GONORRHOEAE DNA AMP PROB: CPT | Performed by: OBSTETRICS & GYNECOLOGY

## 2024-08-04 PROCEDURE — 87491 CHLMYD TRACH DNA AMP PROBE: CPT | Performed by: OBSTETRICS & GYNECOLOGY

## 2024-08-04 PROCEDURE — 87502 INFLUENZA DNA AMP PROBE: CPT

## 2024-08-04 PROCEDURE — 81002 URINALYSIS NONAUTO W/O SCOPE: CPT

## 2024-08-04 PROCEDURE — 99284 EMERGENCY DEPT VISIT MOD MDM: CPT | Mod: ,,, | Performed by: OBSTETRICS & GYNECOLOGY

## 2024-08-04 PROCEDURE — U0002 COVID-19 LAB TEST NON-CDC: HCPCS

## 2024-08-04 PROCEDURE — 87651 STREP A DNA AMP PROBE: CPT

## 2024-08-04 PROCEDURE — 99284 EMERGENCY DEPT VISIT MOD MDM: CPT

## 2024-08-04 RX ORDER — GUAIFENESIN 600 MG/1
1200 TABLET, EXTENDED RELEASE ORAL 2 TIMES DAILY
Qty: 40 TABLET | Refills: 0 | Status: SHIPPED | OUTPATIENT
Start: 2024-08-04 | End: 2024-08-14

## 2024-08-04 NOTE — DISCHARGE INSTRUCTIONS
Call clinic [phone] or L & D after hours at [phone] for vaginal bleeding, leakage of fluids, regular contractions every 5 mins for 2 hours, decreased fetal movements ( 10 kicks in 2 hours), headache not relieved by Tylenol, blurry vision, or temp of 100.4 or greater.  Begin doing fetal kick counts, at least 10 movements in 2 hours starting at 28 weeks gestation.  Keep next clinic appointment

## 2024-08-04 NOTE — ED PROVIDER NOTES
Encounter Date: 2024       History     Chief Complaint   Patient presents with    Abdominal Pain    Vaginal Discharge     Ellen Cardenas is a 24 y.o.  at 20w0d who presents to the OB ED today (2024) with CC of abdominal pain, nausea, cold like symptoms. Pt reports feeling subjective fevers/chills and headache since Thursday, endorses her siblings at home are endorsing similar symptoms. Treating at home with OTC Robitussin. Pt also endorses wheezing/SOB over this period.     Pt complains of new vaginal discharge, mucus like in nature, for the past two hours. Endorses abdominal cramping since this morning. States she nausea at home well treated with home promethazine. Denies vomiting, dysuria, chest pain, visual changes.     She reports no vaginal bleeding, no leakage of fluid, and no contractions.   She reports good fetal movement.  This pregnancy is complicated by GC/CT (neg LINDA).           Review of patient's allergies indicates:   Allergen Reactions    Latex, natural rubber Itching    Opioids - morphine analogues Hives     Past Medical History:   Diagnosis Date    Asthma     childhood    Constipation     Depression     Ectopic pregnancy      Past Surgical History:   Procedure Laterality Date    LAPAROSCOPIC SURGICAL REMOVAL OF ECTOPIC PREGNANCY      SALPINGECTOMY Left 2019    Procedure: SALPINGECTOMY;  Surgeon: Felecia Duran MD;  Location: [facility] OR;  Service: OB/GYN;  Laterality: Left;     Family History   Problem Relation Name Age of Onset    Breast cancer Neg Hx      Colon cancer Neg Hx      Ovarian cancer Neg Hx       Social History     Tobacco Use    Smoking status: Never    Smokeless tobacco: Never   Substance Use Topics    Alcohol use: Not Currently     Comment: OCCASIONAL    Drug use: Not Currently     Types: Marijuana     Review of Systems   Constitutional:  Positive for chills, fatigue and fever.   HENT:  Positive for congestion, sneezing and sore throat.    Eyes:  Negative  Infectious Disease Progress Note  2024   Patient Name: Azael Key Jr. : 1984   Impression  Staphylococcus Spp Bacteremia:  Bilateral Lower Leg Cellulitis:  OM Right 2nd MT Head/ Plantar with Charcot Foot:  OM Left Distal Tibia/ Fibula:   Allergy to ciprofloxacin (SOB):  Allergy to vancomycin (swelling):  Bacteremia likely secondary to bilateral lower extremity OM, awaiting spp of BC and wound cultures. Concern for possible native MV and AV IE as complete TTE revealed suspicions.   CrCl 205. T-max 100.6. no leukocytosis. Hyponatremia. Pct 2.57.  on dwt. ESR 55.   -BC  Staph spp (spp and sensi pending)  -BC Pending  Past: 3/25/2024: right foot wound surgical culture: MSSA  -Right foot wound culture: Morganella morganii (sensi to cefepime, ceftriaxone, ciprofloxacin, ertapenem, levofloxacin, meropenem, Zosyn, tobramycin and moxifloxacin), E.faecalis (sensi pending)  -PCXR: No acute CP abnormality.  -CT Foot right w contrast: OM involving the second metatarsal head detailed above with associated soft tissue abscess formation and cellulitis.   -CT Tibia/ Fibula Left W Contrast: Cellulitis. No abscess identified. There is periosteal reaction   around the distal tibia and fibula concerning for underlying chronic   osteomyelitis. Contrast-enhanced MRI is suggested   -CT Left Foot W Contrast: Osteomyelitis of the ankle and tarsal joints detailed above   -Complete TTE: EF 55-60%, Lablls vs mobile echodensity on AV. Chordae vs mobile echodensity on anterior leaflet of MV.   -ANTWON: Pending  Dr. Galo, general surgery, recommends local wound care at this time, pt is a pt of Dr. 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   Respiratory:  Positive for cough, shortness of breath and wheezing. Negative for chest tightness.    Cardiovascular:  Negative for chest pain, palpitations and leg swelling.   Gastrointestinal:  Positive for nausea. Negative for constipation, diarrhea and vomiting.   Genitourinary:  Negative for dysuria.   Neurological:  Positive for headaches.       Physical Exam     Initial Vitals   BP Pulse Resp Temp SpO2   08/04/24 1049 08/04/24 1049 08/04/24 1049 08/04/24 1049 08/04/24 1050   (!) 101/56 75 16 98.7 °F (37.1 °C) 99 %      MAP       --                Physical Exam    Vitals reviewed.  Constitutional: She appears well-developed and well-nourished.   HENT:   Head: Normocephalic and atraumatic.   Eyes: EOM are normal.   Neck: Neck supple.   Normal range of motion.  Cardiovascular:  Normal rate and regular rhythm.           Pulmonary/Chest: Breath sounds normal. No respiratory distress. She has no wheezes. She has no rhonchi. She has no rales.   Abdominal: Abdomen is soft.   gravid   Genitourinary:    Vagina and uterus normal.     Musculoskeletal:      Cervical back: Normal range of motion and neck supple.       OB LABOR EXAM:   Pre-Term Labor: No.   Membranes ruptured: No.   Method: Sterile vaginal exam per MD and Sterile speculum exam per MD.   Vaginal Bleeding: none present and other (see comments).     Dilatation: 0.       Amniotic Fluid Color: no fluid.   Amniotic Fluid Amount: none noted.   Comments: Speculum exam performed with white physiologic discharge noted at cervical os. Pelvic exam with mild discomfort consistent with 20w pregnancy, closed cervical os.         ED Course   Procedures  Labs Reviewed   INFLUENZA A & B BY MOLECULAR       Result Value    Influenza A, Molecular Negative      Influenza B, Molecular Negative      Flu A & B Source Nasal Swab     GROUP A STREP, MOLECULAR    Group A Strep, Molecular Negative     THROAT SCREEN, RAPID STREP   SARS-COV-2 RNA AMPLIFICATION,  QUAL    SARS-CoV-2 RNA, Amplification, Qual Negative     C. TRACHOMATIS/N. GONORRHOEAE BY AMP DNA   POCT URINALYSIS, DIPSTICK OR TABLET REAGENT, AUTOMATED, WITH MICROSCOP    Color, UA Yellow      Spec Grav UA 1.010      pH, UA 8      WBC, UA trace      Nitrite, UA neg      Protein, POC neg      Glucose, UA norm      Ketones, UA neg      Urobilinogen, UA norm      Bilirubin, POC neg      Blood, UA neg            Imaging Results    None          Medications - No data to display  Medical Decision Making  Ellen Cardenas is a 24 y.o.  at 20w0d who presents to the OB ED  with CC of URI prodrome and vaginal discharge x1 day.      -VSS  -    Fevers/Chills/Viral Prodrome   -PE overall reassuring, pulmonary exam WNL   -pt endorses sick family members at home   -Rapid Covid, Flu, and Strep Neg  -continue home Robitussin and tylenol PRN for treatment of symptoms, eRx mucinex also sent to pharmacy    Vaginal Discharge  -White physiologic discharge on exam, no CMT, discomfort with exam c/w pregnant state  -Hx of GC/CT 3/2024 (Neg LINDA 2024)  -GCCT collected, will contact if positive results    -Pt deemed stable for discharge, strict return precautions discussed at bedside        Kandace Kaur MD   Obstetrics and Gynecology, PGY1       Amount and/or Complexity of Data Reviewed  Labs: ordered.    Risk  OTC drugs.              Attending Attestation:   Physician Attestation Statement for Resident:  As the supervising MD   Physician Attestation Statement: I have personally seen and examined this patient.   I agree with the above history.  -:   As the supervising MD I agree with the above PE.     As the supervising MD I agree with the above treatment, course, plan, and disposition.   -: I agree with the above edited resident note. Pt seen and examined, chart and labs reviewed.    Briefly, 23 yo  at 20w0d presenting for URI symptoms, vaginal discharge. Afebrile, VSS. PE benign, lungs CTAB. FHT 140s. Pelvic  Index 45.2 mL/m2    LA Volume Index MOD A4C 17 16 - 34 ml/m2    LA Size Index 1.42 cm/m2    LA/AO Root Ratio 1.06     Ao Root Index 1.35 cm/m2    AV Velocity Ratio 0.84     LVOT:AV VTI Index 0.96     LISA/BSA VTI 1.5 cm2/m2    LISA/BSA Peak Velocity 1.4 cm2/m2    RVSP 30 mmHg   Creatinine    Collection Time: 07/22/24  2:39 PM   Result Value Ref Range    Creatinine 0.7 (L) 0.9 - 1.3 MG/DL    Est, Glom Filt Rate >90 >60 mL/min/1.73m2   POCT Glucose    Collection Time: 07/22/24  4:19 PM   Result Value Ref Range    POC Glucose 226 (H) 70 - 99 MG/DL   POCT Glucose    Collection Time: 07/22/24  8:31 PM   Result Value Ref Range    POC Glucose 278 (H) 70 - 99 MG/DL   C-Reactive Protein    Collection Time: 07/23/24  2:53 AM   Result Value Ref Range    CRP High Sensitivity 257.7 (H) <5.0 mg/L   POCT Glucose    Collection Time: 07/23/24  7:50 AM   Result Value Ref Range    POC Glucose 171 (H) 70 - 99 MG/DL     CULTURE results: Invalid input(s): \"BLOOD CULTURE\", \"URINE CULTURE\", \"SURGICAL CULTURE\"    Diagnosis:  Patient Active Problem List   Diagnosis    Sprain of left ankle    Closed nondisplaced fracture of fifth metatarsal bone of left foot    Alcohol abuse    UTI (urinary tract infection) due to Enterococcus    Acute osteomyelitis of toe, right (HCC)    Osteomyelitis of fifth toe of right foot ([facility])    Diabetic foot infection ([facility])    Shortness of breath    Asthma exacerbation    Appendicitis    Diabetic foot ulcer with osteomyelitis ([facility])    Cellulitis    Open wound of toe    Rhinovirus    Vision impairment    WD-Nonhealing surgical wound, initial encounter    WD-Amputation of one or more toes ([facility])    WD-Right foot ulcer, with fat layer exposed ([facility])    Type 2 diabetes mellitus with skin complication, with long-term current use of insulin ([facility])    WD-Obesity    Acute post-operative pain    Gangrene of toe of right foot ([facility])    Gangrene of toe ([facility])    Charcot's arthropathy    MRSA infection    Charcot arthropathy of  exam unremarkable, cervix closed, physiologic discharge noted. GC/CT collected due to recent history of both, treated with neg LINDA 6/2024. COVID, flu and strep all negative. Pt counseled on supportive care for likely viral URI. Rx mucinex sent to pharmacy. Return precautions reviewed.     All questions answered. Stable for d/c home with outpatient follow up.     Camilla Parish MD  OB Hospitalist  8/4/2024     I was personally present during the critical portions of the procedure(s) performed by the resident and was immediately available in the ED to provide services and assistance as needed during the entire procedure.  I have reviewed and agree with the residents interpretation of the following: lab data.  I have reviewed the following: old records at this facility.                                       Clinical Impression:  Final diagnoses:  [O26.899, R10.9] Abdominal pain affecting pregnancy (Primary)  [O26.892, N89.8] Vaginal discharge during pregnancy in second trimester  [Z3A.20] 20 weeks gestation of pregnancy          ED Disposition Condition    Discharge Stable          ED Prescriptions       Medication Sig Dispense Start Date End Date Auth. Provider    guaiFENesin (MUCINEX) 600 mg 12 hr tablet Take 2 tablets (1,200 mg total) by mouth 2 (two) times daily. for 10 days 40 tablet 8/4/2024 8/14/2024 Camilla Parish MD          Follow-up Information    None          Camilla Parish MD  08/04/24 1212

## 2024-08-05 LAB
C TRACH DNA SPEC QL NAA+PROBE: NOT DETECTED
N GONORRHOEA DNA SPEC QL NAA+PROBE: NOT DETECTED

## 2024-08-12 ENCOUNTER — TELEPHONE (OUTPATIENT)
Dept: OBSTETRICS AND GYNECOLOGY | Facility: CLINIC | Age: 24
End: 2024-08-12
Payer: MEDICAID

## 2024-08-12 ENCOUNTER — HOSPITAL ENCOUNTER (EMERGENCY)
Facility: OTHER | Age: 24
Discharge: HOME | End: 2024-08-12
Attending: OBSTETRICS & GYNECOLOGY
Payer: MEDICAID

## 2024-08-12 ENCOUNTER — NURSE TRIAGE (OUTPATIENT)
Dept: ADMINISTRATIVE | Facility: CLINIC | Age: 24
End: 2024-08-12
Payer: MEDICAID

## 2024-08-12 VITALS
HEART RATE: 77 BPM | DIASTOLIC BLOOD PRESSURE: 59 MMHG | TEMPERATURE: 98 F | SYSTOLIC BLOOD PRESSURE: 104 MMHG | OXYGEN SATURATION: 100 % | RESPIRATION RATE: 18 BRPM

## 2024-08-12 DIAGNOSIS — Z3A.21 21 WEEKS GESTATION OF PREGNANCY: ICD-10-CM

## 2024-08-12 DIAGNOSIS — R10.9 ABDOMINAL PAIN, UNSPECIFIED ABDOMINAL LOCATION: Primary | ICD-10-CM

## 2024-08-12 LAB
BILIRUB SERPL-MCNC: NEGATIVE MG/DL
BLOOD URINE, POC: NEGATIVE
COLOR, POC UA: NORMAL
GLUCOSE UR QL STRIP: NORMAL
KETONES UR QL STRIP: NEGATIVE
LEUKOCYTE ESTERASE URINE, POC: NEGATIVE
NITRITE, POC UA: NEGATIVE
PH, POC UA: 7
PROTEIN, POC: NORMAL
SPECIFIC GRAVITY, POC UA: 1.01
UROBILINOGEN, POC UA: NORMAL

## 2024-08-12 PROCEDURE — 99284 EMERGENCY DEPT VISIT MOD MDM: CPT | Mod: 25

## 2024-08-12 PROCEDURE — 99284 EMERGENCY DEPT VISIT MOD MDM: CPT | Mod: ,,, | Performed by: OBSTETRICS & GYNECOLOGY

## 2024-08-12 PROCEDURE — 81002 URINALYSIS NONAUTO W/O SCOPE: CPT

## 2024-08-12 PROCEDURE — 25000003 PHARM REV CODE 250

## 2024-08-12 PROCEDURE — 59025 FETAL NON-STRESS TEST: CPT

## 2024-08-12 RX ORDER — ACETAMINOPHEN 500 MG
1000 TABLET ORAL ONCE
Status: COMPLETED | OUTPATIENT
Start: 2024-08-12 | End: 2024-08-12

## 2024-08-12 RX ADMIN — ACETAMINOPHEN 1000 MG: 500 TABLET ORAL at 06:08

## 2024-08-12 NOTE — TELEPHONE ENCOUNTER
----- Message from Nathalie Gandhi sent at 8/12/2024  3:59 PM CDT -----      Name of Who is Calling: NICA HERRERA [[id number]]      What is the request in detail: Pt called regarding sharp pains when she lay down and wanted to know what to do.Please contact to further discuss and advise.          Can the clinic reply by [facility]: Y      What Number to Call Back if not in [facility]: [phone]

## 2024-08-12 NOTE — DISCHARGE INSTRUCTIONS
Call clinic [phone] or L & D after hours at [phone] for vaginal bleeding, leakage of fluids, contractions 4-5 in one hour, headache not relieved by Tylenol, blurry vision, or temp of 100.4 or greater. Keep next clinic appointment

## 2024-08-12 NOTE — TELEPHONE ENCOUNTER
"Pt calling in, having abdominal pain every time she lays down. Currently 22 weeks pregnant. Feels like sharp stabbing pain. Started this morning around 9. Feels better when she sits up. Rates pain 8/10 when it happens. Currently pain is 4/10 while sitting up. Pain feels similar to gas pain. Happens when lying down for longer than 10 minutes it becomes excruciating. Dispo is go to L&D now. Pt verbalizes understanding. Advised to call back with further concerns.    Reason for Disposition   MODERATE-SEVERE abdominal pain (e.g., interferes with normal activities, awakens from sleep)    Additional Information   Negative: Passed out (e.g., fainted, lost consciousness, blacked out and was not responding)   Negative: Shock suspected (e.g., cold/pale/clammy skin, too weak to stand, low BP, rapid pulse)   Negative: Difficult to awaken or acting confused (e.g., disoriented, slurred speech)   Negative: [1] SEVERE abdominal pain (e.g., excruciating) AND [2] constant AND [3] present > 1 hour   Negative: SEVERE vaginal bleeding (e.g., continuous red blood from vagina, large blood clots)   Negative: Sounds like a life-threatening emergency to the triager   Negative: [1] Vomiting AND [2] contains red blood or black ("coffee ground") material  (Exception: Few red streaks in vomit that only happened once.)   Negative: [1] SEVERE headache AND [2] not relieved with acetaminophen (e.g., Tylenol)    Protocols used: Pregnancy - Abdominal Pain Greater Than 20 Weeks RUSTY-MICHELLE-THERESE    "

## 2024-08-12 NOTE — ED PROVIDER NOTES
"Encounter Date: 2024       History     Chief Complaint   Patient presents with    Abdominal Cramping     Pt 21 weeks pregnant reporting lower abdominal cramping. Pt unsure of vaginal bleeding.      Ellen Cardenas is a 24 y.o.  at 21w1d who presents to the OB ED today (2024) with CC of abdominal pain.    Patient reports onset of periumbilical, sharp, and stabbing pain this morning which radiates to her pelvis and RUQ. She states that if she lies down, either on her back or on her sides, the pain begins in 5-10 minutes. She reports the pain persists until she is able to sit up or stand up, which takes her a "long time" due to the pain. Reports that the pain is constant and not crampy.    She reports no vaginal bleeding, no leakage of fluid, and no contractions. She reports no headache, no chest pain nor SOB, does report that she experiences intermittent floaters and dizziness when outside in the sun but denies current symptoms.     She reports good fetal movement.    She reports no GI or urinary changes. She reports mild congestion and a resolving cough but otherwise denies other infectious symptoms.     This pregnancy is complicated by GCCT(+) status, with negative LINDA x2.              Review of patient's allergies indicates:   Allergen Reactions    Latex, natural rubber Itching    Opioids - morphine analogues Hives     Past Medical History:   Diagnosis Date    Asthma     childhood    Constipation     Depression     Ectopic pregnancy      Past Surgical History:   Procedure Laterality Date    LAPAROSCOPIC SURGICAL REMOVAL OF ECTOPIC PREGNANCY      SALPINGECTOMY Left 2019    Procedure: SALPINGECTOMY;  Surgeon: Felecia Duran MD;  Location: [facility];  Service: OB/GYN;  Laterality: Left;     Family History   Problem Relation Name Age of Onset    Breast cancer Neg Hx      Colon cancer Neg Hx      Ovarian cancer Neg Hx       Social History     Tobacco Use    Smoking status: Never    Smokeless " tobacco: Never   Substance Use Topics    Alcohol use: Not Currently     Comment: OCCASIONAL    Drug use: Not Currently     Types: Marijuana     Review of Systems   Constitutional:  Negative for chills and fever.   HENT:  Positive for congestion. Negative for rhinorrhea.    Eyes:  Negative for visual disturbance.   Respiratory:  Positive for cough (resolving). Negative for shortness of breath.    Cardiovascular:  Negative for chest pain and leg swelling.   Gastrointestinal:  Positive for abdominal pain. Negative for constipation, diarrhea, nausea and vomiting.   Genitourinary:  Positive for pelvic pain. Negative for difficulty urinating, dysuria, frequency, vaginal bleeding and vaginal discharge.   Neurological:  Negative for dizziness and headaches.       Physical Exam     Initial Vitals [08/12/24 1712]   BP Pulse Resp Temp SpO2   (!) 95/54 83 18 98.1 °F (36.7 °C) 99 %      MAP       --       Temp:  [98.1 °F (36.7 °C)] 98.1 °F (36.7 °C)  Pulse:  [73-90] 77  Resp:  [18] 18  SpO2:  [99 %-100 %] 100 %  BP: ()/(54-62) 104/59    Physical Exam    Vitals reviewed.  Constitutional: She appears well-developed and well-nourished.   HENT:   Head: Normocephalic and atraumatic.   Eyes: EOM are normal.   Neck:   Normal range of motion.  Cardiovascular:  Normal rate and regular rhythm.           Pulmonary/Chest: Breath sounds normal. No respiratory distress.   Abdominal: Abdomen is soft. There is no abdominal tenderness.   Gravid   There is no guarding.   Musculoskeletal:         General: Normal range of motion.      Cervical back: Normal range of motion.     Neurological: She is alert and oriented to person, place, and time.   Psychiatric: She has a normal mood and affect.     OB LABOR EXAM:                       Comments: Pt declined SVE       ED Course   Fetal non-stress test    Date/Time: 8/12/2024 5:59 PM    Performed by: Alla Kaur MD  Authorized by: Camilla Parish MD    Post-procedure:      FHT performed with  145 BPM, as pt is 21wga     Labs Reviewed   POCT URINALYSIS, DIPSTICK OR TABLET REAGENT, AUTOMATED, WITH MICROSCOP       Result Value    Color, UA Dark Yellow      Spec Grav UA 1.010      pH, UA 7      WBC, UA negative      Nitrite, UA negative      Protein, POC trace      Glucose, UA normal      Ketones, UA negative      Urobilinogen, UA normal      Bilirubin, POC negative      Blood, UA negative            Imaging Results    None          Medications   acetaminophen tablet 1,000 mg (1,000 mg Oral Given 24)     Medical Decision Making  Ellen Cardenas is a 24 y.o.  at 21w1d who presenting with CC of abdominal pain.    -VSS   -     Abdominal pain   - VSS and FHT reassuring, as above   - abdominal exam without focal findings   - s/p 1g tylenol and PO hydration with minimal change in pain  - SVE declined by pt  - Overall reassuring status, strong suspicion for round ligament pain. Supportive care discussed   - f/u appt with Dr. Malcolm,       Kandace Kaur MD (Mary)   Obstetrics and Gynecology, PGY1      Amount and/or Complexity of Data Reviewed  Labs: ordered.    Risk  OTC drugs.              Attending Attestation:   Physician Attestation Statement for Resident:  As the supervising MD   Physician Attestation Statement: I have personally seen and examined this patient.   I agree with the above history.  -:   As the supervising MD I agree with the above PE.     As the supervising MD I agree with the above treatment, course, plan, and disposition.   -: I agree with the above edited resident note. Pt seen and examined, chart and labs reviewed.    Briefly, 23 yo  at 21w2d presenting for abdominal pain, sharp and shooting from umbilicus down inguinal canal to groin. Afebrile, VSS, FHT WNL. Lobeco quiet. Udip neg for infection or dehydration. SVE declined by pt. PO hydration and tylenol provided with minimal improvement. Strong suspicion for round ligament pain. Supportive care  discussed.    All questions answered. Stable for d/c home with outpatient follow up.     Camilla Parish MD  OB Hospitalist  8/13/2024     I was personally present during the critical portions of the procedure(s) performed by the resident and was immediately available in the ED to provide services and assistance as needed during the entire procedure.  I have reviewed and agree with the residents interpretation of the following: lab data.  I have reviewed the following: old records at this facility.                ED Course as of 08/13/24 0026   Mon Aug 12, 2024   1741 BP(!): 95/54 [MC]   1741 Temp: 98.1 °F (36.7 °C) []      ED Course User Index  [] Alla Kaur MD                           Clinical Impression:  Final diagnoses:  [R10.9] Abdominal pain, unspecified abdominal location (Primary)  [Z3A.21] 21 weeks gestation of pregnancy          ED Disposition Condition    Discharge Stable          ED Prescriptions    None       Follow-up Information    None          Camilla Parish MD  08/13/24 0029

## 2024-08-12 NOTE — TELEPHONE ENCOUNTER
Called patient back, patient C/O pelvic pain - patient was advised to increase fluids and pelvic rest,  Patient stated with fluids she is still in high volume of pain.  Patient was advised to go to OB ED for follow up.   Patient understands.

## 2024-08-27 ENCOUNTER — ROUTINE PRENATAL (OUTPATIENT)
Dept: OBSTETRICS AND GYNECOLOGY | Facility: CLINIC | Age: 24
End: 2024-08-27
Attending: OBSTETRICS & GYNECOLOGY
Payer: MEDICAID

## 2024-08-27 VITALS
WEIGHT: 143.06 LBS | SYSTOLIC BLOOD PRESSURE: 102 MMHG | BODY MASS INDEX: 25.35 KG/M2 | DIASTOLIC BLOOD PRESSURE: 52 MMHG

## 2024-08-27 DIAGNOSIS — O99.342 MENTAL DISORDER AFFECTING PREGNANCY IN SECOND TRIMESTER: ICD-10-CM

## 2024-08-27 DIAGNOSIS — Z87.59 HISTORY OF ECTOPIC PREGNANCY: ICD-10-CM

## 2024-08-27 DIAGNOSIS — Z34.80 SUPERVISION OF OTHER NORMAL PREGNANCY, ANTEPARTUM: Primary | ICD-10-CM

## 2024-08-27 PROBLEM — Q63.2 SINGLE PELVIC KIDNEY: Status: ACTIVE | Noted: 2024-08-27

## 2024-08-27 PROCEDURE — 99213 OFFICE O/P EST LOW 20 MIN: CPT | Mod: PBBFAC,TH | Performed by: OBSTETRICS & GYNECOLOGY

## 2024-08-27 PROCEDURE — 99213 OFFICE O/P EST LOW 20 MIN: CPT | Mod: TH,S$PBB,, | Performed by: OBSTETRICS & GYNECOLOGY

## 2024-08-27 PROCEDURE — 99999 PR PBB SHADOW E&M-EST. PATIENT-LVL III: CPT | Mod: PBBFAC,,, | Performed by: OBSTETRICS & GYNECOLOGY

## 2024-08-27 NOTE — PROGRESS NOTES
Pregnancy dating, labs, ultrasound reports, prenatal testing, and problem list; prior records and results; and available outside records were reviewed and updated in EMR.  Pertinent findings were noted below.    Reason for Visit   Routine Prenatal Visit    HPI   24 y.o., at 23w2d by Estimated Date of Delivery: 24    Contractions: No   Bleeding: No   Loss of fluid: No   Fetal movement: Yes   Nausea: No   Vomiting: No   Headache: No     Exam   BP (!) 102/52   Wt 64.9 kg (143 lb 1.3 oz)   LMP 2024   BMI 25.35 kg/m²     TW#  GENERAL: No acute distress  ABD: Gravid    Assessment and Plan   Supervision of other normal pregnancy, antepartum    Mental disorder affecting pregnancy in second trimester        Mood is good  2T labs next week-instructions provided  Finish anatomy -reminded of appt     labor and Pain and bleeding precautions given  Follow-up: 4 weeks

## 2024-09-01 ENCOUNTER — PATIENT MESSAGE (OUTPATIENT)
Dept: OTHER | Facility: OTHER | Age: 24
End: 2024-09-01
Payer: MEDICAID

## 2024-09-04 ENCOUNTER — LAB VISIT (OUTPATIENT)
Dept: LAB | Facility: OTHER | Age: 24
End: 2024-09-04
Payer: MEDICAID

## 2024-09-04 DIAGNOSIS — O99.019 ANTEPARTUM ANEMIA: ICD-10-CM

## 2024-09-04 DIAGNOSIS — O09.92 SUPERVISION OF HIGH RISK PREGNANCY IN SECOND TRIMESTER: ICD-10-CM

## 2024-09-04 DIAGNOSIS — Z34.80 SUPERVISION OF OTHER NORMAL PREGNANCY, ANTEPARTUM: Primary | ICD-10-CM

## 2024-09-04 LAB
BASOPHILS # BLD AUTO: 0.04 K/UL (ref 0–0.2)
BASOPHILS NFR BLD: 0.3 % (ref 0–1.9)
DIFFERENTIAL METHOD BLD: ABNORMAL
EOSINOPHIL # BLD AUTO: 0.1 K/UL (ref 0–0.5)
EOSINOPHIL NFR BLD: 0.8 % (ref 0–8)
ERYTHROCYTE [DISTWIDTH] IN BLOOD BY AUTOMATED COUNT: 13.1 % (ref 11.5–14.5)
GLUCOSE SERPL-MCNC: 121 MG/DL (ref 70–140)
HCT VFR BLD AUTO: 30.3 % (ref 37–48.5)
HGB BLD-MCNC: 9.6 G/DL (ref 12–16)
IMM GRANULOCYTES # BLD AUTO: 0.15 K/UL (ref 0–0.04)
IMM GRANULOCYTES NFR BLD AUTO: 1.2 % (ref 0–0.5)
LYMPHOCYTES # BLD AUTO: 1.5 K/UL (ref 1–4.8)
LYMPHOCYTES NFR BLD: 11.9 % (ref 18–48)
MCH RBC QN AUTO: 29.8 PG (ref 27–31)
MCHC RBC AUTO-ENTMCNC: 31.7 G/DL (ref 32–36)
MCV RBC AUTO: 94 FL (ref 82–98)
MONOCYTES # BLD AUTO: 0.9 K/UL (ref 0.3–1)
MONOCYTES NFR BLD: 7.1 % (ref 4–15)
NEUTROPHILS # BLD AUTO: 9.8 K/UL (ref 1.8–7.7)
NEUTROPHILS NFR BLD: 78.7 % (ref 38–73)
NRBC BLD-RTO: 0 /100 WBC
PLATELET # BLD AUTO: 209 K/UL (ref 150–450)
PMV BLD AUTO: 10.3 FL (ref 9.2–12.9)
RBC # BLD AUTO: 3.22 M/UL (ref 4–5.4)
WBC # BLD AUTO: 12.45 K/UL (ref 3.9–12.7)

## 2024-09-04 PROCEDURE — 82950 GLUCOSE TEST: CPT

## 2024-09-04 PROCEDURE — 36415 COLL VENOUS BLD VENIPUNCTURE: CPT

## 2024-09-04 PROCEDURE — 85025 COMPLETE CBC W/AUTO DIFF WBC: CPT

## 2024-09-04 RX ORDER — DOCUSATE SODIUM 100 MG/1
100 CAPSULE, LIQUID FILLED ORAL 2 TIMES DAILY
Qty: 60 CAPSULE | Refills: 2 | Status: SHIPPED | OUTPATIENT
Start: 2024-09-04 | End: 2025-09-04

## 2024-09-04 RX ORDER — FERROUS SULFATE 325(65) MG
325 TABLET, DELAYED RELEASE (ENTERIC COATED) ORAL DAILY
Qty: 60 TABLET | Refills: 2 | Status: SHIPPED | OUTPATIENT
Start: 2024-09-04

## 2024-09-09 ENCOUNTER — PROCEDURE VISIT (OUTPATIENT)
Dept: MATERNAL FETAL MEDICINE | Facility: CLINIC | Age: 24
End: 2024-09-09
Payer: MEDICAID

## 2024-09-09 DIAGNOSIS — Z36.2 ENCOUNTER FOR FOLLOW-UP ULTRASOUND OF FETAL ANATOMY: ICD-10-CM

## 2024-09-09 PROCEDURE — 76816 OB US FOLLOW-UP PER FETUS: CPT | Mod: PBBFAC | Performed by: OBSTETRICS & GYNECOLOGY

## 2024-09-14 ENCOUNTER — HOSPITAL ENCOUNTER (EMERGENCY)
Facility: OTHER | Age: 24
Discharge: HOME | End: 2024-09-14
Attending: OBSTETRICS & GYNECOLOGY
Payer: MEDICAID

## 2024-09-14 ENCOUNTER — NURSE TRIAGE (OUTPATIENT)
Dept: ADMINISTRATIVE | Facility: CLINIC | Age: 24
End: 2024-09-14
Payer: MEDICAID

## 2024-09-14 VITALS
TEMPERATURE: 98 F | SYSTOLIC BLOOD PRESSURE: 98 MMHG | DIASTOLIC BLOOD PRESSURE: 61 MMHG | HEART RATE: 90 BPM | OXYGEN SATURATION: 99 % | RESPIRATION RATE: 16 BRPM

## 2024-09-14 DIAGNOSIS — O26.899 ABDOMINAL PAIN AFFECTING PREGNANCY, ANTEPARTUM: Primary | ICD-10-CM

## 2024-09-14 DIAGNOSIS — R10.9 ABDOMINAL PAIN AFFECTING PREGNANCY, ANTEPARTUM: Primary | ICD-10-CM

## 2024-09-14 DIAGNOSIS — Z3A.25 25 WEEKS GESTATION OF PREGNANCY: ICD-10-CM

## 2024-09-14 LAB
BILIRUB SERPL-MCNC: NORMAL MG/DL
BLOOD URINE, POC: NORMAL
COLOR, POC UA: YELLOW
GLUCOSE UR QL STRIP: NORMAL
KETONES UR QL STRIP: NORMAL
LEUKOCYTE ESTERASE URINE, POC: NORMAL
NITRITE, POC UA: NORMAL
PH, POC UA: 7
PROTEIN, POC: NORMAL
SPECIFIC GRAVITY, POC UA: 1.01
UROBILINOGEN, POC UA: NORMAL

## 2024-09-14 PROCEDURE — 81002 URINALYSIS NONAUTO W/O SCOPE: CPT

## 2024-09-14 PROCEDURE — 59025 FETAL NON-STRESS TEST: CPT

## 2024-09-14 PROCEDURE — 59025 FETAL NON-STRESS TEST: CPT | Mod: 26,,, | Performed by: OBSTETRICS & GYNECOLOGY

## 2024-09-14 PROCEDURE — 99284 EMERGENCY DEPT VISIT MOD MDM: CPT | Mod: 25

## 2024-09-14 PROCEDURE — 76815 OB US LIMITED FETUS(S): CPT | Mod: 26,,, | Performed by: OBSTETRICS & GYNECOLOGY

## 2024-09-14 PROCEDURE — 99284 EMERGENCY DEPT VISIT MOD MDM: CPT | Mod: 25,,, | Performed by: OBSTETRICS & GYNECOLOGY

## 2024-09-14 NOTE — ED PROVIDER NOTES
Encounter Date: 2024       History     Chief Complaint   Patient presents with    Abdominal Pain     Ellen Cardenas is a 24 y.o. [id number] at 25w6d presents complaining of abdominal pain.     Patient has been having some abdominal cramping since last night and this morning. She states there is some cramping in her abdomen that radiates to her back. She states that she thinks it occurs when the baby is bunching up.   She denies any fevers, chills, contractions, or dysuria.     This IUP is complicated by positive GC/CT (neg LINDA), maternal R. Pelvic kidney, hx of ex-lap.  Patient denies contractions, denies vaginal bleeding, denies LOF.   Fetal Movement: normal        Review of patient's allergies indicates:   Allergen Reactions    Latex, natural rubber Itching    Opioids - morphine analogues Hives     Past Medical History:   Diagnosis Date    Asthma     childhood    Constipation     Depression     Ectopic pregnancy      Past Surgical History:   Procedure Laterality Date    EXPLORATORY LAPAROTOMY      ECTOPIC    SALPINGECTOMY Left 2019    Procedure: SALPINGECTOMY;  Surgeon: Felecia Duran MD;  Location: [facility];  Service: OB/GYN;  Laterality: Left;     Family History   Problem Relation Name Age of Onset    Breast cancer Neg Hx      Colon cancer Neg Hx      Ovarian cancer Neg Hx       Social History     Tobacco Use    Smoking status: Never    Smokeless tobacco: Never   Substance Use Topics    Alcohol use: Not Currently     Comment: OCCASIONAL    Drug use: Not Currently     Types: Marijuana     Review of Systems   Constitutional:  Negative for chills and fatigue.   HENT:  Negative for congestion and sore throat.    Eyes:  Negative for visual disturbance.   Respiratory:  Negative for chest tightness and shortness of breath.    Cardiovascular:  Negative for chest pain and leg swelling.   Gastrointestinal:  Positive for abdominal pain.   Genitourinary:  Negative for dysuria, vaginal bleeding and vaginal  discharge.   Musculoskeletal:  Positive for back pain.   Neurological:  Negative for headaches.       Physical Exam     Initial Vitals [09/14/24 1215]   BP Pulse Resp Temp SpO2   (!) 105/54 73 16 98.2 °F (36.8 °C) 100 %      MAP       --         Physical Exam    Vitals reviewed.  Constitutional: She appears well-developed and well-nourished. No distress.   HENT:   Head: Normocephalic and atraumatic.   Neck: Neck supple.   Normal range of motion.  Cardiovascular:  Normal rate.           Pulmonary/Chest: No respiratory distress.   Abdominal: There is no abdominal tenderness.   Gravid abdomen   Musculoskeletal:         General: Normal range of motion.      Cervical back: Normal range of motion and neck supple.     Neurological: She is alert and oriented to person, place, and time.   Skin: Skin is warm and dry.   Psychiatric: She has a normal mood and affect. Her behavior is normal. Thought content normal.     OB LABOR EXAM:   Pre-Term Labor: No.     Method: Sterile vaginal exam per MD.       Dilatation: 0.   Station: -3.   Effacement: 50%.             ED Course   Obtain Fetal nonstress test (NST)    Date/Time: 9/14/2024 2:53 PM    Performed by: Michelle Mckinnon MD  Authorized by: Camilla Parish MD    Nonstress Test:     Variability:  6-25 BPM    Decelerations:  None    Accelerations:  10 bpm    Baseline:  135    Contractions:  Not present  Biophysical Profile:     Nonstress Test Interpretation: reactive      Overall Impression:  Reassuring  Post-procedure:     Patient tolerance:  Patient tolerated the procedure well with no immediate complications    Labs Reviewed   POCT URINALYSIS, DIPSTICK OR TABLET REAGENT, AUTOMATED, WITH MICROSCOP       Result Value    Color, UA Yellow      Spec Grav UA 1.010      pH, UA 7      WBC, UA trace      Nitrite, UA neg      Protein, POC neg      Glucose, UA norm      Ketones, UA neg      Urobilinogen, UA norm      Bilirubin, POC neg      Blood, UA neg            Imaging Results     None          Medications - No data to display  Medical Decision Making  Ellen Cardenas is a 24 y.o. [id number] at 25w6d presents complaining of abdominal pain.     Temp:  [98.2 °F (36.8 °C)] 98.2 °F (36.8 °C)  Pulse:  [73-91] 90  Resp:  [16] 16  SpO2:  [98 %-100 %] 99 %  BP: ()/(54-61) 98/61    NST reactive and reassuring   [place] quiet    PE: non-tender to abdominal palpation, no rebound or guarding  Udip unremarkable    Patient unlikely to have infectious process due to lack of fever, N/V, diarrhea. Likely MSK pain vs fetal movements. Patient counseled to take tylenol and use a heating pad for relief. Maternity belt also discussed. Return to ED precautions given for vaginal bleeding, LOF, and painful contractions 2-3min apart for 1 hour. Reviewed kick counts starting at 28 weeks with patient.   Patient stable for discharge at this time.     Michelle Mckinnon MD  Obstetrics & Gynecology, PGY-1        Amount and/or Complexity of Data Reviewed  Labs: ordered.              Attending Attestation:   Physician Attestation Statement for Resident:  As the supervising MD   Physician Attestation Statement: I have personally seen and examined this patient.   I agree with the above history.  -:   As the supervising MD I agree with the above PE.     As the supervising MD I agree with the above treatment, course, plan, and disposition.   -: I agree with the above edited resident note. Pt seen and examined, chart and labs reviewed.    Briefly, 25 yo  at 25w6d presenting for abd pain. Afebrile, VSS. NST Cat I appropriate for GA, [place] quiet. SVE long/closed/high. Udip neg for infection or dehydration. BSS shows SIUP in cephalic presentation, fetal breech at site of discomfort, suspect abd pain related to fetal positioning. Comfort measures, tylenol, maternity belt discussed. Pt reassured by normal findings.     All questions answered. Stable for d/c home with outpatient follow up.     Camilla Parish MD  OB  Hospitalist  9/14/2024     I was personally present during the critical portions of the procedure(s) performed by the resident and was immediately available in the ED to provide services and assistance as needed during the entire procedure.  I have reviewed and agree with the residents interpretation of the following: lab data.  I have reviewed the following: old records at this facility.                                       Clinical Impression:  Final diagnoses:  [O26.899, R10.9] Abdominal pain affecting pregnancy, antepartum (Primary)  [Z3A.25] 25 weeks gestation of pregnancy          ED Disposition Condition    Discharge Stable          ED Prescriptions    None       Follow-up Information    None          Michelle Mckinnon MD  Resident  09/14/24 [id number]       Camilla Parish MD  09/14/24 [id number]

## 2024-09-14 NOTE — TELEPHONE ENCOUNTER
"Patient c/o lower abdominal cramping that radiates to her lower back. Symptoms occur every 3-5 mins and began last night. Advised per protocol to go to L&D now. Advised the patient to call back with any further questions or if symptoms worsen.      Reason for Disposition   MODERATE-SEVERE abdominal pain (e.g., interferes with normal activities, awakens from sleep)    Additional Information   Negative: Passed out (e.g., fainted, lost consciousness, blacked out and was not responding)   Negative: Shock suspected (e.g., cold/pale/clammy skin, too weak to stand, low BP, rapid pulse)   Negative: Difficult to awaken or acting confused (e.g., disoriented, slurred speech)   Negative: [1] SEVERE abdominal pain (e.g., excruciating) AND [2] constant AND [3] present > 1 hour   Negative: SEVERE vaginal bleeding (e.g., continuous red blood from vagina, large blood clots)   Negative: Sounds like a life-threatening emergency to the triager   Negative: [1] Vomiting AND [2] contains red blood or black ("coffee ground") material  (Exception: Few red streaks in vomit that only happened once.)   Negative: [1] SEVERE headache AND [2] not relieved with acetaminophen (e.g., Tylenol)    Protocols used: Pregnancy - Abdominal Pain Greater Than 20 Weeks EG-A-    "

## 2024-09-24 ENCOUNTER — ROUTINE PRENATAL (OUTPATIENT)
Dept: OBSTETRICS AND GYNECOLOGY | Facility: CLINIC | Age: 24
End: 2024-09-24
Attending: OBSTETRICS & GYNECOLOGY
Payer: MEDICAID

## 2024-09-24 VITALS — WEIGHT: 145.5 LBS | SYSTOLIC BLOOD PRESSURE: 110 MMHG | DIASTOLIC BLOOD PRESSURE: 64 MMHG | BODY MASS INDEX: 25.77 KG/M2

## 2024-09-24 DIAGNOSIS — Z34.80 SUPERVISION OF OTHER NORMAL PREGNANCY, ANTEPARTUM: Primary | ICD-10-CM

## 2024-09-24 PROCEDURE — 90715 TDAP VACCINE 7 YRS/> IM: CPT | Mod: PBBFAC

## 2024-09-24 PROCEDURE — 99212 OFFICE O/P EST SF 10 MIN: CPT | Mod: PBBFAC,TH,25 | Performed by: OBSTETRICS & GYNECOLOGY

## 2024-09-24 PROCEDURE — 99999PBSHW PR PBB SHADOW TECHNICAL ONLY FILED TO HB: Mod: PBBFAC,,,

## 2024-09-24 PROCEDURE — 99213 OFFICE O/P EST LOW 20 MIN: CPT | Mod: TH,S$PBB,, | Performed by: OBSTETRICS & GYNECOLOGY

## 2024-09-24 PROCEDURE — 99999 PR PBB SHADOW E&M-EST. PATIENT-LVL II: CPT | Mod: PBBFAC,,, | Performed by: OBSTETRICS & GYNECOLOGY

## 2024-09-24 PROCEDURE — 90471 IMMUNIZATION ADMIN: CPT | Mod: PBBFAC

## 2024-09-24 RX ADMIN — TETANUS TOXOID, REDUCED DIPHTHERIA TOXOID AND ACELLULAR PERTUSSIS VACCINE, ADSORBED 0.5 ML: 5; 2.5; 8; 8; 2.5 SUSPENSION INTRAMUSCULAR at 11:09

## 2024-09-24 NOTE — PROGRESS NOTES
Pregnancy dating, labs, ultrasound reports, prenatal testing, and problem list; prior records and results; and available outside records were reviewed and updated in EMR.  Pertinent findings were noted below.    Reason for Visit   Routine Prenatal Visit    HPI   24 y.o., at 27w2d by Estimated Date of Delivery: 24    Did not  iron Rx yet    Contractions: No   Bleeding: No   Loss of fluid: No   Fetal movement: Yes   Nausea: Yes-due to heartburn   Vomiting: Yes but rarely, due to heartburn   Headache: Yes - resolves on own     Exam   /64   Wt 66 kg (145 lb 8.1 oz)   LMP 2024   BMI 25.77 kg/m²     TW#  GENERAL: No acute distress  ABD: Gravid    Assessment and Plan   Supervision of other normal pregnancy, antepartum  -     Tdap (BOOSTRIX) vaccine injection 0.5 mL      Discussed weight gain recommendations and walking 5x/week for 30 min  Will try milk when having GERD sxs, since we dont want PPI to reduce stomach acid for iron absorption  Tdap given today     labor, Pain and bleeding, and fetal movement count precautions given  Follow-up: 2 weeks

## 2024-09-29 ENCOUNTER — PATIENT MESSAGE (OUTPATIENT)
Dept: OTHER | Facility: OTHER | Age: 24
End: 2024-09-29
Payer: MEDICAID

## 2024-10-01 ENCOUNTER — PATIENT MESSAGE (OUTPATIENT)
Dept: PSYCHIATRY | Facility: CLINIC | Age: 24
End: 2024-10-01
Payer: MEDICAID

## 2024-10-08 ENCOUNTER — ROUTINE PRENATAL (OUTPATIENT)
Dept: OBSTETRICS AND GYNECOLOGY | Facility: CLINIC | Age: 24
End: 2024-10-08
Attending: OBSTETRICS & GYNECOLOGY
Payer: MEDICAID

## 2024-10-08 VITALS
WEIGHT: 149.94 LBS | SYSTOLIC BLOOD PRESSURE: 116 MMHG | DIASTOLIC BLOOD PRESSURE: 68 MMHG | BODY MASS INDEX: 26.56 KG/M2

## 2024-10-08 DIAGNOSIS — O99.343 MENTAL DISORDER AFFECTING PREGNANCY IN THIRD TRIMESTER: ICD-10-CM

## 2024-10-08 DIAGNOSIS — O09.93 SUPERVISION OF HIGH RISK PREGNANCY IN THIRD TRIMESTER: Primary | ICD-10-CM

## 2024-10-08 DIAGNOSIS — A60.09 GENITAL HERPES SIMPLEX VIRUS (HSV) INFECTION IN MOTHER AFFECTING PREGNANCY: ICD-10-CM

## 2024-10-08 DIAGNOSIS — O99.013 ANEMIA AFFECTING PREGNANCY IN THIRD TRIMESTER: ICD-10-CM

## 2024-10-08 DIAGNOSIS — O98.319 GENITAL HERPES SIMPLEX VIRUS (HSV) INFECTION IN MOTHER AFFECTING PREGNANCY: ICD-10-CM

## 2024-10-08 DIAGNOSIS — L29.3 PRURITUS OF GENITAL ORGANS: ICD-10-CM

## 2024-10-08 DIAGNOSIS — O99.519 MATERNAL ASTHMA COMPLICATING PREGNANCY: ICD-10-CM

## 2024-10-08 DIAGNOSIS — J45.909 MATERNAL ASTHMA COMPLICATING PREGNANCY: ICD-10-CM

## 2024-10-08 DIAGNOSIS — L29.9 PRURITUS: ICD-10-CM

## 2024-10-08 PROBLEM — Z3A.25 25 WEEKS GESTATION OF PREGNANCY: Status: RESOLVED | Noted: 2024-09-14 | Resolved: 2024-10-08

## 2024-10-08 PROBLEM — Z87.59 HISTORY OF ECTOPIC PREGNANCY: Status: RESOLVED | Noted: 2024-08-27 | Resolved: 2024-10-08

## 2024-10-08 LAB
ALBUMIN SERPL BCP-MCNC: 3 G/DL (ref 3.5–5.2)
ALP SERPL-CCNC: 64 U/L (ref 55–135)
ALT SERPL W/O P-5'-P-CCNC: 11 U/L (ref 10–44)
ANION GAP SERPL CALC-SCNC: 9 MMOL/L (ref 8–16)
AST SERPL-CCNC: 14 U/L (ref 10–40)
BASOPHILS # BLD AUTO: 0.03 K/UL (ref 0–0.2)
BASOPHILS NFR BLD: 0.3 % (ref 0–1.9)
BILIRUB SERPL-MCNC: 0.1 MG/DL (ref 0.1–1)
BUN SERPL-MCNC: 9 MG/DL (ref 6–20)
CALCIUM SERPL-MCNC: 8.7 MG/DL (ref 8.7–10.5)
CHLORIDE SERPL-SCNC: 108 MMOL/L (ref 95–110)
CO2 SERPL-SCNC: 23 MMOL/L (ref 23–29)
CREAT SERPL-MCNC: 0.7 MG/DL (ref 0.5–1.4)
DIFFERENTIAL METHOD BLD: ABNORMAL
EOSINOPHIL # BLD AUTO: 0.1 K/UL (ref 0–0.5)
EOSINOPHIL NFR BLD: 1.2 % (ref 0–8)
ERYTHROCYTE [DISTWIDTH] IN BLOOD BY AUTOMATED COUNT: 13.3 % (ref 11.5–14.5)
EST. GFR  (NO RACE VARIABLE): >60 ML/MIN/1.73 M^2
FERRITIN SERPL-MCNC: 20 NG/ML (ref 20–300)
GLUCOSE SERPL-MCNC: 116 MG/DL (ref 70–110)
HAV IGM SERPL QL IA: NORMAL
HBV CORE IGM SERPL QL IA: NORMAL
HBV SURFACE AG SERPL QL IA: NORMAL
HCT VFR BLD AUTO: 31.8 % (ref 37–48.5)
HCV AB SERPL QL IA: NEGATIVE
HGB BLD-MCNC: 10.3 G/DL (ref 12–16)
IMM GRANULOCYTES # BLD AUTO: 0.13 K/UL (ref 0–0.04)
IMM GRANULOCYTES NFR BLD AUTO: 1.1 % (ref 0–0.5)
IRON SERPL-MCNC: 77 UG/DL (ref 30–160)
LYMPHOCYTES # BLD AUTO: 1.6 K/UL (ref 1–4.8)
LYMPHOCYTES NFR BLD: 13.8 % (ref 18–48)
MCH RBC QN AUTO: 29.8 PG (ref 27–31)
MCHC RBC AUTO-ENTMCNC: 32.4 G/DL (ref 32–36)
MCV RBC AUTO: 92 FL (ref 82–98)
MONOCYTES # BLD AUTO: 0.7 K/UL (ref 0.3–1)
MONOCYTES NFR BLD: 6.3 % (ref 4–15)
NEUTROPHILS # BLD AUTO: 9.1 K/UL (ref 1.8–7.7)
NEUTROPHILS NFR BLD: 77.3 % (ref 38–73)
NRBC BLD-RTO: 0 /100 WBC
PLATELET # BLD AUTO: 218 K/UL (ref 150–450)
PMV BLD AUTO: 11.4 FL (ref 9.2–12.9)
POTASSIUM SERPL-SCNC: 4 MMOL/L (ref 3.5–5.1)
PROT SERPL-MCNC: 6.3 G/DL (ref 6–8.4)
RBC # BLD AUTO: 3.46 M/UL (ref 4–5.4)
SATURATED IRON: 14 % (ref 20–50)
SODIUM SERPL-SCNC: 140 MMOL/L (ref 136–145)
TOTAL IRON BINDING CAPACITY: 551 UG/DL (ref 250–450)
TRANSFERRIN SERPL-MCNC: 372 MG/DL (ref 200–375)
WBC # BLD AUTO: 11.76 K/UL (ref 3.9–12.7)

## 2024-10-08 PROCEDURE — 99213 OFFICE O/P EST LOW 20 MIN: CPT | Mod: PBBFAC,TH | Performed by: OBSTETRICS & GYNECOLOGY

## 2024-10-08 PROCEDURE — 99999 PR PBB SHADOW E&M-EST. PATIENT-LVL III: CPT | Mod: PBBFAC,,, | Performed by: OBSTETRICS & GYNECOLOGY

## 2024-10-08 PROCEDURE — 80074 ACUTE HEPATITIS PANEL: CPT | Performed by: OBSTETRICS & GYNECOLOGY

## 2024-10-08 PROCEDURE — 99213 OFFICE O/P EST LOW 20 MIN: CPT | Mod: TH,S$PBB,, | Performed by: OBSTETRICS & GYNECOLOGY

## 2024-10-08 PROCEDURE — 82728 ASSAY OF FERRITIN: CPT | Performed by: OBSTETRICS & GYNECOLOGY

## 2024-10-08 PROCEDURE — 80053 COMPREHEN METABOLIC PANEL: CPT | Performed by: OBSTETRICS & GYNECOLOGY

## 2024-10-08 PROCEDURE — 99999PBSHW FLU VACCINE (QUAD) GREATER THAN OR EQUAL TO 3YO PRESERVATIVE FREE IM: Mod: PBBFAC,,,

## 2024-10-08 PROCEDURE — 85025 COMPLETE CBC W/AUTO DIFF WBC: CPT | Performed by: OBSTETRICS & GYNECOLOGY

## 2024-10-08 PROCEDURE — 90471 IMMUNIZATION ADMIN: CPT | Mod: PBBFAC

## 2024-10-08 PROCEDURE — 90686 IIV4 VACC NO PRSV 0.5 ML IM: CPT | Mod: PBBFAC

## 2024-10-08 PROCEDURE — 84466 ASSAY OF TRANSFERRIN: CPT | Performed by: OBSTETRICS & GYNECOLOGY

## 2024-10-08 PROCEDURE — 82239 BILE ACIDS TOTAL: CPT | Performed by: OBSTETRICS & GYNECOLOGY

## 2024-10-08 NOTE — PROGRESS NOTES
Complaints today:n/v/diarrhea/itching all over.  She complains of headaches.  She is taking tylenol sporadically.  She has used imitrex in the past, Good fetal movements reported. No Bleeding or pains    /68   Wt 68 kg (149 lb 14.6 oz)   LMP 2024   BMI 26.56 kg/m²     24 y.o., at 29w2d by Estimated Date of Delivery: 24  Patient Active Problem List   Diagnosis    Depression    Sexually transmitted disease (STD)-GC and chlam treated  24 outside clinic    Anxiety    Eczema    Gastroesophageal reflux disease without esophagitis    Generalized anxiety disorder    Herpes simplex viral infection    Refractory migraine with aura    Pregnancy, supervision, high-risk    Genital herpes simplex virus (HSV) infection in mother affecting pregnancy    Mental disorder affecting pregnancy    Maternal asthma complicating pregnancy    Single pelvic kidney    Anemia affecting pregnancy in third trimester     OB History    Para Term  AB Living   3       2 0   SAB IAB Ectopic Multiple Live Births       2          # Outcome Date GA Lbr Callum/2nd Weight Sex Type Anes PTL Lv   3 Current            2 Ectopic            1 Ectopic                Dating reviewed    Allergies and problem list reviewed and updated    Medical and surgical history reviewed    Prenatal labs reviewed and updated    Physical Exam:  ABD: soft, gravid, nontender,     Assessment:  Ellen was seen today for routine prenatal visit.    Diagnoses and all orders for this visit:    Supervision of high risk pregnancy in third trimester  -     Influenza - Quadrivalent (PF)    Maternal asthma complicating pregnancy    Mental disorder affecting pregnancy in third trimester    Genital herpes simplex virus (HSV) infection in mother affecting pregnancy    Pruritus of genital organs  -     Hepatitis Panel, Acute; Future  -     BILE ACIDS, CHOLYLGLYCINE; Future    Anemia affecting pregnancy in third trimester  -     Iron and TIBC; Future  -      Ferritin; Future  -     CBC Auto Differential; Future         Plan:   follow up labs.  Recommend Tylenol and imitrex as needed.  BP normal.  She is taking pnv but not extra iron.  Needs WIC form.  Discussed NFP and Healthy start.  She is doing well off prozac.   follow up 2. Weeks, bleeding/pain precautions  kick counts, labor precautions

## 2024-10-10 LAB — BILE AC SERPL-SCNC: 3 MCMOL/L

## 2024-10-13 ENCOUNTER — PATIENT MESSAGE (OUTPATIENT)
Dept: OTHER | Facility: OTHER | Age: 24
End: 2024-10-13
Payer: MEDICAID

## 2024-10-18 ENCOUNTER — HOSPITAL ENCOUNTER (EMERGENCY)
Facility: OTHER | Age: 24
Discharge: HOME | End: 2024-10-18
Attending: OBSTETRICS & GYNECOLOGY
Payer: MEDICAID

## 2024-10-18 VITALS
HEART RATE: 82 BPM | DIASTOLIC BLOOD PRESSURE: 70 MMHG | TEMPERATURE: 98 F | SYSTOLIC BLOOD PRESSURE: 97 MMHG | OXYGEN SATURATION: 99 % | RESPIRATION RATE: 18 BRPM

## 2024-10-18 DIAGNOSIS — R10.9 ABDOMINAL PAIN AFFECTING PREGNANCY: Primary | ICD-10-CM

## 2024-10-18 DIAGNOSIS — R04.0 EPISTAXIS: ICD-10-CM

## 2024-10-18 DIAGNOSIS — O26.899 ABDOMINAL PAIN AFFECTING PREGNANCY: Primary | ICD-10-CM

## 2024-10-18 DIAGNOSIS — Z3A.30 30 WEEKS GESTATION OF PREGNANCY: ICD-10-CM

## 2024-10-18 LAB
ALBUMIN SERPL BCP-MCNC: 3.2 G/DL (ref 3.5–5.2)
ALP SERPL-CCNC: 78 U/L (ref 40–150)
ALT SERPL W/O P-5'-P-CCNC: 8 U/L (ref 10–44)
ANION GAP SERPL CALC-SCNC: 9 MMOL/L (ref 8–16)
AST SERPL-CCNC: 12 U/L (ref 10–40)
BASOPHILS # BLD AUTO: 0.03 K/UL (ref 0–0.2)
BASOPHILS NFR BLD: 0.2 % (ref 0–1.9)
BILIRUB SERPL-MCNC: 0.3 MG/DL (ref 0.1–1)
BILIRUB SERPL-MCNC: NEGATIVE MG/DL
BLOOD URINE, POC: NEGATIVE
BUN SERPL-MCNC: 7 MG/DL (ref 6–20)
CALCIUM SERPL-MCNC: 8.9 MG/DL (ref 8.7–10.5)
CHLORIDE SERPL-SCNC: 108 MMOL/L (ref 95–110)
CO2 SERPL-SCNC: 21 MMOL/L (ref 23–29)
COLOR, POC UA: YELLOW
CREAT SERPL-MCNC: 0.7 MG/DL (ref 0.5–1.4)
DIFFERENTIAL METHOD BLD: ABNORMAL
EOSINOPHIL # BLD AUTO: 0.1 K/UL (ref 0–0.5)
EOSINOPHIL NFR BLD: 0.8 % (ref 0–8)
ERYTHROCYTE [DISTWIDTH] IN BLOOD BY AUTOMATED COUNT: 13.2 % (ref 11.5–14.5)
EST. GFR  (NO RACE VARIABLE): >60 ML/MIN/1.73 M^2
GLUCOSE SERPL-MCNC: 72 MG/DL (ref 70–110)
GLUCOSE UR QL STRIP: NORMAL
HCT VFR BLD AUTO: 33.7 % (ref 37–48.5)
HGB BLD-MCNC: 10.7 G/DL (ref 12–16)
IMM GRANULOCYTES # BLD AUTO: 0.11 K/UL (ref 0–0.04)
IMM GRANULOCYTES NFR BLD AUTO: 0.8 % (ref 0–0.5)
KETONES UR QL STRIP: NORMAL
LEUKOCYTE ESTERASE URINE, POC: NEGATIVE
LYMPHOCYTES # BLD AUTO: 1.6 K/UL (ref 1–4.8)
LYMPHOCYTES NFR BLD: 12.2 % (ref 18–48)
MCH RBC QN AUTO: 29.4 PG (ref 27–31)
MCHC RBC AUTO-ENTMCNC: 31.8 G/DL (ref 32–36)
MCV RBC AUTO: 93 FL (ref 82–98)
MONOCYTES # BLD AUTO: 1 K/UL (ref 0.3–1)
MONOCYTES NFR BLD: 8 % (ref 4–15)
NEUTROPHILS # BLD AUTO: 10.1 K/UL (ref 1.8–7.7)
NEUTROPHILS NFR BLD: 78 % (ref 38–73)
NITRITE, POC UA: NEGATIVE
NRBC BLD-RTO: 0 /100 WBC
PH, POC UA: 7
PLATELET # BLD AUTO: 214 K/UL (ref 150–450)
PMV BLD AUTO: 10.8 FL (ref 9.2–12.9)
POTASSIUM SERPL-SCNC: 3.9 MMOL/L (ref 3.5–5.1)
PROT SERPL-MCNC: 6.6 G/DL (ref 6–8.4)
PROTEIN, POC: NORMAL
RBC # BLD AUTO: 3.64 M/UL (ref 4–5.4)
SODIUM SERPL-SCNC: 138 MMOL/L (ref 136–145)
SPECIFIC GRAVITY, POC UA: 1
UROBILINOGEN, POC UA: 1
WBC # BLD AUTO: 12.99 K/UL (ref 3.9–12.7)

## 2024-10-18 PROCEDURE — 81002 URINALYSIS NONAUTO W/O SCOPE: CPT

## 2024-10-18 PROCEDURE — 85025 COMPLETE CBC W/AUTO DIFF WBC: CPT

## 2024-10-18 PROCEDURE — 80053 COMPREHEN METABOLIC PANEL: CPT

## 2024-10-18 PROCEDURE — 25000003 PHARM REV CODE 250: Performed by: OBSTETRICS & GYNECOLOGY

## 2024-10-18 PROCEDURE — 99284 EMERGENCY DEPT VISIT MOD MDM: CPT | Mod: 25

## 2024-10-18 PROCEDURE — 99284 EMERGENCY DEPT VISIT MOD MDM: CPT | Mod: ,,, | Performed by: OBSTETRICS & GYNECOLOGY

## 2024-10-18 PROCEDURE — 59025 FETAL NON-STRESS TEST: CPT | Mod: 26,,, | Performed by: OBSTETRICS & GYNECOLOGY

## 2024-10-18 PROCEDURE — 59025 FETAL NON-STRESS TEST: CPT

## 2024-10-18 RX ORDER — DIPHENHYDRAMINE HCL 25 MG
25 CAPSULE ORAL EVERY 6 HOURS PRN
Status: DISCONTINUED | OUTPATIENT
Start: 2024-10-18 | End: 2024-10-18

## 2024-10-18 RX ORDER — METOCLOPRAMIDE 5 MG/1
10 TABLET ORAL
Status: DISCONTINUED | OUTPATIENT
Start: 2024-10-18 | End: 2024-10-18

## 2024-10-18 RX ORDER — METOCLOPRAMIDE 5 MG/1
10 TABLET ORAL ONCE
Status: COMPLETED | OUTPATIENT
Start: 2024-10-18 | End: 2024-10-18

## 2024-10-18 RX ORDER — DIPHENHYDRAMINE HCL 25 MG
25 CAPSULE ORAL ONCE
Status: COMPLETED | OUTPATIENT
Start: 2024-10-18 | End: 2024-10-18

## 2024-10-18 RX ADMIN — DIPHENHYDRAMINE HYDROCHLORIDE 25 MG: 25 CAPSULE ORAL at 11:10

## 2024-10-18 RX ADMIN — METOCLOPRAMIDE 10 MG: 5 TABLET ORAL at 11:10

## 2024-10-22 ENCOUNTER — ROUTINE PRENATAL (OUTPATIENT)
Dept: OBSTETRICS AND GYNECOLOGY | Facility: CLINIC | Age: 24
End: 2024-10-22
Attending: OBSTETRICS & GYNECOLOGY
Payer: MEDICAID

## 2024-10-22 VITALS
WEIGHT: 158.94 LBS | BODY MASS INDEX: 28.16 KG/M2 | SYSTOLIC BLOOD PRESSURE: 110 MMHG | DIASTOLIC BLOOD PRESSURE: 70 MMHG

## 2024-10-22 DIAGNOSIS — R19.7 DIARRHEA, UNSPECIFIED TYPE: ICD-10-CM

## 2024-10-22 DIAGNOSIS — O99.343 MENTAL DISORDER AFFECTING PREGNANCY IN THIRD TRIMESTER: ICD-10-CM

## 2024-10-22 DIAGNOSIS — O09.93 SUPERVISION OF HIGH RISK PREGNANCY IN THIRD TRIMESTER: Primary | ICD-10-CM

## 2024-10-22 DIAGNOSIS — J45.909 MATERNAL ASTHMA COMPLICATING PREGNANCY: ICD-10-CM

## 2024-10-22 DIAGNOSIS — O99.013 ANEMIA AFFECTING PREGNANCY IN THIRD TRIMESTER: ICD-10-CM

## 2024-10-22 DIAGNOSIS — O99.519 MATERNAL ASTHMA COMPLICATING PREGNANCY: ICD-10-CM

## 2024-10-22 DIAGNOSIS — O99.019 ANTEPARTUM ANEMIA: ICD-10-CM

## 2024-10-22 PROCEDURE — 99999 PR PBB SHADOW E&M-EST. PATIENT-LVL III: CPT | Mod: PBBFAC,,, | Performed by: OBSTETRICS & GYNECOLOGY

## 2024-10-22 PROCEDURE — 99213 OFFICE O/P EST LOW 20 MIN: CPT | Mod: PBBFAC,TH | Performed by: OBSTETRICS & GYNECOLOGY

## 2024-10-22 PROCEDURE — 99213 OFFICE O/P EST LOW 20 MIN: CPT | Mod: TH,S$PBB,, | Performed by: OBSTETRICS & GYNECOLOGY

## 2024-10-22 RX ORDER — FERROUS SULFATE 325(65) MG
325 TABLET, DELAYED RELEASE (ENTERIC COATED) ORAL DAILY
Qty: 60 TABLET | Refills: 2 | Status: SHIPPED | OUTPATIENT
Start: 2024-10-22

## 2024-10-22 NOTE — PROGRESS NOTES
Complaints today:hand numbness/swelling.  She is having diarrhea for >7 days, Good fetal movements reported. No Bleeding or pains    /70   Wt 72.1 kg (158 lb 15.2 oz)   LMP 2024   BMI 28.16 kg/m²     24 y.o., at 31w2d by Estimated Date of Delivery: 24  Patient Active Problem List   Diagnosis    Depression    Sexually transmitted disease (STD)-GC and chlam treated  24 outside clinic    Anxiety    Eczema    Gastroesophageal reflux disease without esophagitis    Generalized anxiety disorder    Herpes simplex viral infection    Refractory migraine with aura    Pregnancy, supervision, high-risk    Genital herpes simplex virus (HSV) infection in mother affecting pregnancy    Mental disorder affecting pregnancy    Maternal asthma complicating pregnancy    Single pelvic kidney    Anemia affecting pregnancy in third trimester     OB History    Para Term  AB Living   3       2 0   SAB IAB Ectopic Multiple Live Births       2          # Outcome Date GA Lbr Callum/2nd Weight Sex Type Anes PTL Lv   3 Current            2 Ectopic            1 Ectopic                Dating reviewed    Allergies and problem list reviewed and updated    Medical and surgical history reviewed    Prenatal labs reviewed and updated    Physical Exam:  ABD: soft, gravid, nontender,     Assessment:  Ellen was seen today for routine prenatal visit.    Diagnoses and all orders for this visit:    Supervision of high risk pregnancy in third trimester  -     CBC Auto Differential; Future  -     Cancel: HIV 1/2 Ag/Ab (4th Gen); Future  -     Cancel: Treponema Pallidium Antibodies IgG, IgM; Future    Maternal asthma complicating pregnancy    Anemia affecting pregnancy in third trimester  -     CBC Auto Differential; Future  -     Iron and TIBC; Future  -     Cancel: Ferritin; Future    Mental disorder affecting pregnancy in third trimester    Diarrhea, unspecified type  -     Cancel: H. pylori antigen, stool; Future  -      Cancel: Pancreatic elastase, fecal; Future  -     Cancel: Fecal fat, qualitative; Future  -     Cancel: Occult blood x 1, stool; Future  -     Cancel: WBC, Stool; Future  -     Cancel: Rotavirus antigen, stool; Future  -     Cancel: Adenovirus Molecular Detection, PCR, Non-Blood Stool; Future  -     Cancel: Calprotectin, Stool; Future  -     Cancel: Giardia / Cryptosporidum, EIA; Future  -     Cancel: Stool Exam-Ova,Cysts,Parasites; Future  -     Cancel: Clostridium difficile EIA; Future  -     Cancel: Stool culture; Future  -     HIV 1/2 Ag/Ab (4th Gen); Future  -     H. pylori antigen, stool; Future  -     Pancreatic elastase, fecal; Future  -     Fecal fat, qualitative; Future  -     Occult blood x 1, stool; Future  -     WBC, Stool; Future  -     Rotavirus antigen, stool; Future  -     Adenovirus Molecular Detection, PCR, Non-Blood Stool; Future  -     Calprotectin, Stool; Future  -     Giardia / Cryptosporidum, EIA; Future  -     Stool Exam-Ova,Cysts,Parasites; Future  -     Clostridium difficile EIA; Future  -     Stool culture; Future    Antepartum anemia  -     ferrous sulfate 325 (65 FE) MG EC tablet; Take 1 tablet (325 mg total) by mouth once daily.         Plan:   follow up stool studies.  Resent iron.  Will check labs in a week   follow up 2 Weeks, bleeding/pain precautions  kick counts, labor precautions.  Recommend wrist splints.      [employer] Counseling:  I discussed with the patient the risks of mycophenolate mofetil including but not limited to infection/immunosuppression, GI upset, hypokalemia, hypercholesterolemia, bone marrow suppression, lymphoproliferative disorders, malignancy, GI ulceration/bleed/perforation, colitis, interstitial lung disease, kidney failure, progressive multifocal leukoencephalopathy, and birth defects.  The patient understands that monitoring is required including a baseline creatinine and regular CBC testing. In addition, patient must alert us immediately if symptoms of infection or other concerning signs are noted.

## 2024-10-25 ENCOUNTER — LAB VISIT (OUTPATIENT)
Dept: LAB | Facility: OTHER | Age: 24
End: 2024-10-25
Attending: OBSTETRICS & GYNECOLOGY
Payer: MEDICAID

## 2024-10-25 DIAGNOSIS — O99.013 ANEMIA AFFECTING PREGNANCY IN THIRD TRIMESTER: ICD-10-CM

## 2024-10-25 DIAGNOSIS — R19.7 DIARRHEA, UNSPECIFIED TYPE: ICD-10-CM

## 2024-10-25 DIAGNOSIS — O09.93 SUPERVISION OF HIGH RISK PREGNANCY IN THIRD TRIMESTER: ICD-10-CM

## 2024-10-25 LAB
BASOPHILS # BLD AUTO: 0.04 K/UL (ref 0–0.2)
BASOPHILS NFR BLD: 0.3 % (ref 0–1.9)
DIFFERENTIAL METHOD BLD: ABNORMAL
EOSINOPHIL # BLD AUTO: 0.1 K/UL (ref 0–0.5)
EOSINOPHIL NFR BLD: 1.1 % (ref 0–8)
ERYTHROCYTE [DISTWIDTH] IN BLOOD BY AUTOMATED COUNT: 13.5 % (ref 11.5–14.5)
HCT VFR BLD AUTO: 33.6 % (ref 37–48.5)
HGB BLD-MCNC: 10.8 G/DL (ref 12–16)
HIV 1+2 AB+HIV1 P24 AG SERPL QL IA: NEGATIVE
IMM GRANULOCYTES # BLD AUTO: 0.11 K/UL (ref 0–0.04)
IMM GRANULOCYTES NFR BLD AUTO: 0.9 % (ref 0–0.5)
IRON SERPL-MCNC: 122 UG/DL (ref 30–160)
LYMPHOCYTES # BLD AUTO: 1.6 K/UL (ref 1–4.8)
LYMPHOCYTES NFR BLD: 14 % (ref 18–48)
MCH RBC QN AUTO: 29.8 PG (ref 27–31)
MCHC RBC AUTO-ENTMCNC: 32.1 G/DL (ref 32–36)
MCV RBC AUTO: 93 FL (ref 82–98)
MONOCYTES # BLD AUTO: 0.9 K/UL (ref 0.3–1)
MONOCYTES NFR BLD: 7.3 % (ref 4–15)
NEUTROPHILS # BLD AUTO: 8.9 K/UL (ref 1.8–7.7)
NEUTROPHILS NFR BLD: 76.4 % (ref 38–73)
NRBC BLD-RTO: 0 /100 WBC
OB PNL STL: NEGATIVE
PLATELET # BLD AUTO: 205 K/UL (ref 150–450)
PMV BLD AUTO: 11 FL (ref 9.2–12.9)
RBC # BLD AUTO: 3.63 M/UL (ref 4–5.4)
SATURATED IRON: 19 % (ref 20–50)
TOTAL IRON BINDING CAPACITY: 639 UG/DL (ref 250–450)
TRANSFERRIN SERPL-MCNC: 432 MG/DL (ref 200–375)
WBC # BLD AUTO: 11.7 K/UL (ref 3.9–12.7)

## 2024-10-25 PROCEDURE — 87045 FECES CULTURE AEROBIC BACT: CPT | Performed by: OBSTETRICS & GYNECOLOGY

## 2024-10-25 PROCEDURE — 87328 CRYPTOSPORIDIUM AG IA: CPT | Performed by: OBSTETRICS & GYNECOLOGY

## 2024-10-25 PROCEDURE — 83993 ASSAY FOR CALPROTECTIN FECAL: CPT | Performed by: OBSTETRICS & GYNECOLOGY

## 2024-10-25 PROCEDURE — 87425 ROTAVIRUS AG IA: CPT | Performed by: OBSTETRICS & GYNECOLOGY

## 2024-10-25 PROCEDURE — 36415 COLL VENOUS BLD VENIPUNCTURE: CPT | Performed by: OBSTETRICS & GYNECOLOGY

## 2024-10-25 PROCEDURE — 85025 COMPLETE CBC W/AUTO DIFF WBC: CPT | Performed by: OBSTETRICS & GYNECOLOGY

## 2024-10-25 PROCEDURE — 82653 EL-1 FECAL QUANTITATIVE: CPT | Performed by: OBSTETRICS & GYNECOLOGY

## 2024-10-25 PROCEDURE — 87449 NOS EACH ORGANISM AG IA: CPT | Performed by: OBSTETRICS & GYNECOLOGY

## 2024-10-25 PROCEDURE — 87389 HIV-1 AG W/HIV-1&-2 AB AG IA: CPT | Performed by: OBSTETRICS & GYNECOLOGY

## 2024-10-25 PROCEDURE — 83540 ASSAY OF IRON: CPT | Performed by: OBSTETRICS & GYNECOLOGY

## 2024-10-25 PROCEDURE — 87329 GIARDIA AG IA: CPT | Performed by: OBSTETRICS & GYNECOLOGY

## 2024-10-25 PROCEDURE — 87427 SHIGA-LIKE TOXIN AG IA: CPT | Mod: 59 | Performed by: OBSTETRICS & GYNECOLOGY

## 2024-10-25 PROCEDURE — 87798 DETECT AGENT NOS DNA AMP: CPT | Performed by: OBSTETRICS & GYNECOLOGY

## 2024-10-25 PROCEDURE — 82272 OCCULT BLD FECES 1-3 TESTS: CPT | Performed by: OBSTETRICS & GYNECOLOGY

## 2024-10-25 PROCEDURE — 87046 STOOL CULTR AEROBIC BACT EA: CPT | Performed by: OBSTETRICS & GYNECOLOGY

## 2024-10-25 PROCEDURE — 89055 LEUKOCYTE ASSESSMENT FECAL: CPT | Performed by: OBSTETRICS & GYNECOLOGY

## 2024-10-25 PROCEDURE — 84466 ASSAY OF TRANSFERRIN: CPT | Performed by: OBSTETRICS & GYNECOLOGY

## 2024-10-25 PROCEDURE — 87209 SMEAR COMPLEX STAIN: CPT | Performed by: OBSTETRICS & GYNECOLOGY

## 2024-10-25 PROCEDURE — 87338 HPYLORI STOOL AG IA: CPT | Performed by: OBSTETRICS & GYNECOLOGY

## 2024-10-25 PROCEDURE — 82705 FATS/LIPIDS FECES QUAL: CPT | Performed by: OBSTETRICS & GYNECOLOGY

## 2024-10-26 LAB
RV AG STL QL IA.RAPID: NEGATIVE
WBC #/AREA STL HPF: ABNORMAL /[HPF]

## 2024-10-27 ENCOUNTER — PATIENT MESSAGE (OUTPATIENT)
Dept: OTHER | Facility: OTHER | Age: 24
End: 2024-10-27
Payer: MEDICAID

## 2024-10-27 LAB
E COLI SXT1 STL QL IA: NEGATIVE
E COLI SXT2 STL QL IA: NEGATIVE

## 2024-10-28 LAB
BACTERIA STL CULT: NORMAL
CALPROTECTIN STL-MCNT: 47.2 MCG/G
CRYPTOSP AG STL QL IA: NEGATIVE
FAT STL QL: NORMAL
G LAMBLIA AG STL QL IA: NEGATIVE
NEUTRAL FAT STL QL: NORMAL

## 2024-10-29 LAB — ELASTASE 1, FECAL: 334 MCG/G

## 2024-10-30 LAB
HADV DNA SERPL QL NAA+PROBE: NEGATIVE
SPECIMEN SOURCE: NORMAL

## 2024-11-01 LAB — O+P STL MICRO: NORMAL

## 2024-11-05 ENCOUNTER — PROCEDURE VISIT (OUTPATIENT)
Dept: OBSTETRICS AND GYNECOLOGY | Facility: CLINIC | Age: 24
End: 2024-11-05
Payer: MEDICAID

## 2024-11-05 ENCOUNTER — ROUTINE PRENATAL (OUTPATIENT)
Dept: OBSTETRICS AND GYNECOLOGY | Facility: CLINIC | Age: 24
End: 2024-11-05
Attending: OBSTETRICS & GYNECOLOGY
Payer: MEDICAID

## 2024-11-05 VITALS
DIASTOLIC BLOOD PRESSURE: 60 MMHG | WEIGHT: 159.81 LBS | BODY MASS INDEX: 28.31 KG/M2 | SYSTOLIC BLOOD PRESSURE: 124 MMHG

## 2024-11-05 DIAGNOSIS — O99.343 MENTAL DISORDER AFFECTING PREGNANCY IN THIRD TRIMESTER: ICD-10-CM

## 2024-11-05 DIAGNOSIS — A04.8 H. PYLORI INFECTION: ICD-10-CM

## 2024-11-05 DIAGNOSIS — O09.93 SUPERVISION OF HIGH RISK PREGNANCY IN THIRD TRIMESTER: Primary | ICD-10-CM

## 2024-11-05 DIAGNOSIS — A60.09 GENITAL HERPES SIMPLEX VIRUS (HSV) INFECTION IN MOTHER AFFECTING PREGNANCY: ICD-10-CM

## 2024-11-05 DIAGNOSIS — J45.909 MATERNAL ASTHMA COMPLICATING PREGNANCY: ICD-10-CM

## 2024-11-05 DIAGNOSIS — O09.93 SUPERVISION OF HIGH RISK PREGNANCY IN THIRD TRIMESTER: ICD-10-CM

## 2024-11-05 DIAGNOSIS — O99.013 ANEMIA AFFECTING PREGNANCY IN THIRD TRIMESTER: ICD-10-CM

## 2024-11-05 DIAGNOSIS — O99.519 MATERNAL ASTHMA COMPLICATING PREGNANCY: ICD-10-CM

## 2024-11-05 DIAGNOSIS — O98.319 GENITAL HERPES SIMPLEX VIRUS (HSV) INFECTION IN MOTHER AFFECTING PREGNANCY: ICD-10-CM

## 2024-11-05 LAB
BASOPHILS # BLD AUTO: 0.04 K/UL (ref 0–0.2)
BASOPHILS NFR BLD: 0.3 % (ref 0–1.9)
DIFFERENTIAL METHOD BLD: ABNORMAL
EOSINOPHIL # BLD AUTO: 0.1 K/UL (ref 0–0.5)
EOSINOPHIL NFR BLD: 1.2 % (ref 0–8)
ERYTHROCYTE [DISTWIDTH] IN BLOOD BY AUTOMATED COUNT: 13.9 % (ref 11.5–14.5)
HCT VFR BLD AUTO: 31.1 % (ref 37–48.5)
HGB BLD-MCNC: 10.1 G/DL (ref 12–16)
IMM GRANULOCYTES # BLD AUTO: 0.17 K/UL (ref 0–0.04)
IMM GRANULOCYTES NFR BLD AUTO: 1.5 % (ref 0–0.5)
LYMPHOCYTES # BLD AUTO: 1.7 K/UL (ref 1–4.8)
LYMPHOCYTES NFR BLD: 14.9 % (ref 18–48)
MCH RBC QN AUTO: 30.1 PG (ref 27–31)
MCHC RBC AUTO-ENTMCNC: 32.5 G/DL (ref 32–36)
MCV RBC AUTO: 93 FL (ref 82–98)
MONOCYTES # BLD AUTO: 1.1 K/UL (ref 0.3–1)
MONOCYTES NFR BLD: 9.7 % (ref 4–15)
NEUTROPHILS # BLD AUTO: 8.3 K/UL (ref 1.8–7.7)
NEUTROPHILS NFR BLD: 72.4 % (ref 38–73)
NRBC BLD-RTO: 0 /100 WBC
PLATELET # BLD AUTO: 214 K/UL (ref 150–450)
PMV BLD AUTO: 11.6 FL (ref 9.2–12.9)
RBC # BLD AUTO: 3.36 M/UL (ref 4–5.4)
TREPONEMA PALLIDUM IGG+IGM AB [PRESENCE] IN SERUM OR PLASMA BY IMMUNOASSAY: NONREACTIVE
WBC # BLD AUTO: 11.51 K/UL (ref 3.9–12.7)

## 2024-11-05 PROCEDURE — 99999 PR PBB SHADOW E&M-EST. PATIENT-LVL III: CPT | Mod: PBBFAC,,, | Performed by: OBSTETRICS & GYNECOLOGY

## 2024-11-05 PROCEDURE — 99213 OFFICE O/P EST LOW 20 MIN: CPT | Mod: PBBFAC,TH | Performed by: OBSTETRICS & GYNECOLOGY

## 2024-11-05 PROCEDURE — 99213 OFFICE O/P EST LOW 20 MIN: CPT | Mod: TH,S$PBB,, | Performed by: OBSTETRICS & GYNECOLOGY

## 2024-11-05 PROCEDURE — 86593 SYPHILIS TEST NON-TREP QUANT: CPT

## 2024-11-05 PROCEDURE — 85025 COMPLETE CBC W/AUTO DIFF WBC: CPT

## 2024-11-05 RX ORDER — OMEPRAZOLE 20 MG/1
20 CAPSULE, DELAYED RELEASE ORAL DAILY
Qty: 90 CAPSULE | Refills: 3 | Status: SHIPPED | OUTPATIENT
Start: 2024-11-05 | End: 2025-11-05

## 2024-11-05 RX ORDER — FAMOTIDINE 20 MG/1
20 TABLET, FILM COATED ORAL 2 TIMES DAILY
Qty: 60 TABLET | Refills: 1 | Status: SHIPPED | OUTPATIENT
Start: 2024-11-05 | End: 2025-11-05

## 2024-11-05 NOTE — PROGRESS NOTES
Lab Documentation:    Order Type: Written Order placed in [facility]    Patient in for lab visit only per provider treatment plan.

## 2024-11-05 NOTE — PROGRESS NOTES
Pregnancy dating, labs, ultrasound reports, prenatal testing, and problem list; prior records and results; and available outside records were reviewed and updated in EMR.  Pertinent findings were noted below.    Reason for Visit   Routine Prenatal Visit    HPI   24 y.o., at 33w2d by Estimated Date of Delivery: 24    Complaining of continued acid reflux today. Reports occasional back and hip pain that improves with stretches and heating packs. Complaining of carpal tunnel pain and tingling.     Contractions: No   Bleeding: No   Loss of fluid: No   Fetal movement: Yes   Nausea: Yes- occasional    Vomiting: No   Headache: No     Exam   /60   Wt 72.5 kg (159 lb 13.3 oz)   LMP 2024   BMI 28.31 kg/m²     TW  GENERAL: No acute distress  ABD: Gravid    Assessment and Plan   Supervision of high risk pregnancy in third trimester  -     CBC W/ AUTO DIFFERENTIAL; Future; Expected date: 2024  -     Treponema Pallidium Antibodies IgG, IgM; Future; Expected date: 2024  -     RSV, preF A and preF B(PF) (Abrysvo) vaccine 120 mcg    Genital herpes simplex virus (HSV) infection in mother affecting pregnancy    Mental disorder affecting pregnancy in third trimester    Maternal asthma complicating pregnancy    Anemia affecting pregnancy in third trimester    Care and examination of lactating mother  -     BREAST PUMP FOR HOME USE    Other orders  -     omeprazole (PRILOSEC) 20 MG capsule; Take 1 capsule (20 mg total) by mouth once daily.  Dispense: 90 capsule; Refill: 3  -     famotidine (PEPCID) 20 MG tablet; Take 1 tablet (20 mg total) by mouth 2 (two) times daily.  Dispense: 60 tablet; Refill: 1        Counseled on H. Pylori diagnosis today. Pepcid and PPI prescription sent.   3 T labs ordered   Breast pump prescription ordered   Counseled on pp IUD   RSV vaccine given     labor precautions given  Follow-up: 2 weeks

## 2024-11-07 ENCOUNTER — TELEPHONE (OUTPATIENT)
Dept: OBSTETRICS AND GYNECOLOGY | Facility: CLINIC | Age: 24
End: 2024-11-07
Payer: MEDICAID

## 2024-11-07 RX ORDER — DIPHENHYDRAMINE HYDROCHLORIDE 50 MG/ML
50 INJECTION INTRAMUSCULAR; INTRAVENOUS ONCE AS NEEDED
OUTPATIENT
Start: 2024-11-11

## 2024-11-07 RX ORDER — EPINEPHRINE 0.3 MG/.3ML
0.3 INJECTION SUBCUTANEOUS ONCE AS NEEDED
OUTPATIENT
Start: 2024-11-11

## 2024-11-07 RX ORDER — HEPARIN 100 UNIT/ML
500 SYRINGE INTRAVENOUS
OUTPATIENT
Start: 2024-11-11

## 2024-11-07 RX ORDER — SODIUM CHLORIDE 0.9 % (FLUSH) 0.9 %
10 SYRINGE (ML) INJECTION
OUTPATIENT
Start: 2024-11-11

## 2024-11-08 NOTE — TELEPHONE ENCOUNTER
Called the patient to verify if she was going to be able to attend todays follow up session. Patient informed the clinician about an emergency appointment that she had today and asked for her follow up to be rescheduled for Tuesday, 6/18/24, at 2 PM.    No

## 2024-11-08 NOTE — TELEPHONE ENCOUNTER
Called patient to notify about need for IV iron. Patient questions answered. Therapy plan initiated. Chemo nurse messaged.     Trish Adrian MD PGY-3  Obstetrics and Gynecology  [facility]

## 2024-11-10 ENCOUNTER — HOSPITAL ENCOUNTER (EMERGENCY)
Facility: OTHER | Age: 24
Discharge: HOME | End: 2024-11-10
Attending: OBSTETRICS & GYNECOLOGY
Payer: MEDICAID

## 2024-11-10 VITALS
TEMPERATURE: 99 F | SYSTOLIC BLOOD PRESSURE: 98 MMHG | HEART RATE: 79 BPM | DIASTOLIC BLOOD PRESSURE: 53 MMHG | RESPIRATION RATE: 16 BRPM | OXYGEN SATURATION: 99 %

## 2024-11-10 DIAGNOSIS — O23.599 BACTERIAL VAGINOSIS IN PREGNANCY: Primary | ICD-10-CM

## 2024-11-10 DIAGNOSIS — O47.9 UTERINE CONTRACTIONS: ICD-10-CM

## 2024-11-10 DIAGNOSIS — Z3A.34 34 WEEKS GESTATION OF PREGNANCY: ICD-10-CM

## 2024-11-10 DIAGNOSIS — B96.89 BACTERIAL VAGINOSIS IN PREGNANCY: Primary | ICD-10-CM

## 2024-11-10 LAB
BILIRUBIN, POC UA: NEGATIVE
BLOOD, POC UA: NEGATIVE
CLARITY, UA: CLEAR
COLOR, UA: YELLOW
GLUCOSE, POC UA: NEGATIVE
KETONES, POC UA: NEGATIVE
LEUKOCYTE EST, POC UA: NEGATIVE
NITRITE, POC UA: NEGATIVE
PH UR STRIP: 7 [PH] (ref 5–8)
PROTEIN, POC UA: ABNORMAL
SPECIFIC GRAVITY, POC UA: 1.02 (ref 1–1.03)
UROBILINOGEN, POC UA: 0.2 E.U./DL

## 2024-11-10 PROCEDURE — 99284 EMERGENCY DEPT VISIT MOD MDM: CPT | Mod: 25

## 2024-11-10 PROCEDURE — 99284 EMERGENCY DEPT VISIT MOD MDM: CPT | Mod: ,,, | Performed by: OBSTETRICS & GYNECOLOGY

## 2024-11-10 PROCEDURE — 59025 FETAL NON-STRESS TEST: CPT | Mod: 26,,, | Performed by: OBSTETRICS & GYNECOLOGY

## 2024-11-10 PROCEDURE — 59025 FETAL NON-STRESS TEST: CPT

## 2024-11-10 RX ORDER — METRONIDAZOLE 500 MG/1
500 TABLET ORAL EVERY 12 HOURS
Qty: 14 TABLET | Refills: 0 | Status: SHIPPED | OUTPATIENT
Start: 2024-11-10 | End: 2024-11-10

## 2024-11-10 RX ORDER — METRONIDAZOLE 500 MG/1
500 TABLET ORAL EVERY 12 HOURS
Qty: 14 TABLET | Refills: 0 | Status: SHIPPED | OUTPATIENT
Start: 2024-11-10 | End: 2024-11-17

## 2024-11-10 NOTE — DISCHARGE INSTRUCTIONS
Stay hydrated by drinking 2-3 liters of water per day. Call or come to the OB Emergency Department for signs of labor such as painful contractions, leaking of fluid, or bleeding. You may take Tylenol (2 regular strength or 1 extra strength) for discomforts of pregnancy such as mild cramping, soreness, and back pain. If you have a history of elevated blood pressures, call us for increased blood pressure readings at home, a headache that persists after taking Tylenol, blurred vision, shortness of breath, or sharp upper abdominal pain. Monitor your baby's movements. If you are concerned that your baby's movements are decreased, call the [facility] for further evaluation.     OB Emergency Department: [phone]

## 2024-11-10 NOTE — ED PROVIDER NOTES
Encounter Date: 11/10/2024       History     Chief Complaint   Patient presents with    ruptured bag    ruptured bag of water       Ellen Cardenas is a 24 y.o.  at 34w0d who presents to the OB ED today (11/10/2024) with CC of ROM.    Patient reports to [facility] with concerns of ROM. States that she urinated around midnight, at which time she noticed continued discharge, prompting her to come in to be checked. Pt denies having to change underwear, wear pads, or dribbling with coughing or walking. Denies vaginal bleeding. PT endorses that she has been curb walking and drinking okra juice to bring on labor at 34wga. Pt has a hc of +GC/CT, negative LINDA. Denies sexual activity since treatment. Endorses occasional nausea, denies vomiting, urinary frequency, urinary urgency, or burning. Endorses occasional headaches that go away with rest. Pt endorses contractions, q 15 or so minutes and 5/10 in pain     She reports no vaginal bleeding, + leakage of fluid, and + contractions.   She reports good fetal movement.  This pregnancy is complicated by GCCT+ (- LINDA), maternal pelvic kidney, hx of exlap (ectopic). .          Review of patient's allergies indicates:   Allergen Reactions    Latex, natural rubber Itching    Opioids - morphine analogues Hives     Past Medical History:   Diagnosis Date    Asthma     childhood    Constipation     Depression     Ectopic pregnancy      Past Surgical History:   Procedure Laterality Date    EXPLORATORY LAPAROTOMY      ECTOPIC    SALPINGECTOMY Left 2019    Procedure: SALPINGECTOMY;  Surgeon: Felecia Duran MD;  Location: [facility] OR;  Service: OB/GYN;  Laterality: Left;     Family History   Problem Relation Name Age of Onset    Breast cancer Neg Hx      Colon cancer Neg Hx      Ovarian cancer Neg Hx       Social History     Tobacco Use    Smoking status: Never    Smokeless tobacco: Never   Substance Use Topics    Alcohol use: Not Currently     Comment: OCCASIONAL    Drug use: Not  Currently     Types: Marijuana     Review of Systems   Constitutional:  Negative for chills and fever.   HENT:  Negative for congestion.    Eyes:  Negative for visual disturbance.   Respiratory:  Negative for chest tightness.    Gastrointestinal:  Positive for nausea. Negative for abdominal pain, constipation, diarrhea and vomiting.   Genitourinary:  Positive for pelvic pain and vaginal discharge. Negative for dysuria, urgency, vaginal bleeding and vaginal pain.   Neurological:  Positive for headaches.       Physical Exam     Initial Vitals [11/10/24 0156]   BP Pulse Resp Temp SpO2   106/62 84 16 98.6 °F (37 °C) 98 %      MAP       --         Physical Exam    Vitals reviewed.  Constitutional: She appears well-developed and well-nourished. No distress.   HENT:   Head: Normocephalic and atraumatic.   Eyes: Conjunctivae and EOM are normal.   Neck:   Normal range of motion.  Cardiovascular:  Normal rate and regular rhythm.           Pulmonary/Chest: Breath sounds normal. No respiratory distress.   Abdominal: Abdomen is soft. She exhibits no distension. There is no abdominal tenderness.   Musculoskeletal:         General: Normal range of motion.      Cervical back: Normal range of motion.     Neurological: She is alert and oriented to person, place, and time.   Psychiatric: She has a normal mood and affect. Thought content normal.     OB LABOR EXAM:                   Amniotic Fluid Color: no fluid.     Comments: SVE: Cl/th/hi   SSE: - pooling, - nitrazine, - ferning. OS visually closed   Wet prep: + clue cells        ED Course   Obtain Fetal nonstress test (NST)    Date/Time: 11/10/2024 2:17 AM    Performed by: Alla Kaur MD  Authorized by: Alla Kaur MD    Nonstress Test:     Variability:  6-25 BPM    Decelerations:  None    Accelerations:  15 bpm    Baseline:  135    Uterine Irritability: Yes      Contractions:  Regular  Biophysical Profile:     Nonstress Test Interpretation: reactive      Overall Impression:   Reassuring    Labs Reviewed   POCT URINALYSIS W/O SCOPE - Abnormal       Result Value    Spec Grav UA 1.025      PH, UA 7.0      Protein, UA Trace (*)     Glucose, UA Negative      Ketones, UA Negative      Bilirubin, UA Negative      Blood, UA Negative      Leukocytes, UA Negative      Nitrite, UA Negative      Urobilinogen, UA 0.2      Color, UA POC Yellow      Clarity, UA, POC Clear            Imaging Results    None          Medications - No data to display  Medical Decision Making  Ellen Cardenas is a 24 y.o.  at 34w0d who presents to the OB ED today (11/10/2024) with CC of ROM.    Temp:  [98.6 °F (37 °C)] 98.6 °F (37 °C)  Pulse:  [84] 84  Resp:  [16] 16  BP: (106)/(62) 106/62    -VSS  -NST reactive and reassuring   -toco with irregular contractions     ROM  -pt endorses gush of fluid on toilet around midnight   -PE significant for + discharge on pelvic exam  -pt declined sti testing   - POC UA WNL    -SSE - nitrazine, - ferning, + clue cells on wet prep   -SVE cl, th, hi. Repeat exam 1 hr unchanged   -metronidazole sent     Contractions   -pt reports irregular contractions   -pt endorses curb walking and drinking okra juice to elicit labor   -pt counseled regarding  status, and that labor at this time is not recommended. Recommended halt in methods trying to elicit  labor. Pt endorsed understating     Pt deemed stable for discharge with strict return to ed precautions discussed at bedside     Kandace Kaur MD (Mary)   Obstetrics and Gynecology, PGY1      Amount and/or Complexity of Data Reviewed  Labs: ordered.    Risk  Prescription drug management.              Attending Attestation:   Physician Attestation Statement for Resident:  As the supervising MD   Physician Attestation Statement: I have personally seen and examined this patient.   I agree with the above history.  -:   As the supervising MD I agree with the above PE.     As the supervising MD I agree with the above  treatment, course, plan, and disposition.   I was personally present during the critical portions of the procedure(s) performed by the resident and was immediately available in the ED to provide services and assistance as needed during the entire procedure.  I have reviewed and agree with the residents interpretation of the following: lab data.  I have reviewed the following: old records at this facility.            Attending ED Notes:   I saw and examined the patient myself along with the resident. I have personally reviewed pertinent prior records, labs, imaging, and procedures. I have reviewed the documentation and agree with the findings and plan as documented.      at 34w0d presenting with leakage of fluid and irregular contractions. Rupture of membranes ruled out on exam. Wet prep consistent with BV, will treat with Metronidazole. Cvx closed/thick/high and unchanged after monitoring, labor ruled out. NST reactive and reassuring. Discharge home in stable condition. Return precautions discussed.    Lisbeth Galvez MD FACOG  OB Hospitalist                                 Clinical Impression:  Final diagnoses:  [O23.599, B96.89] Bacterial vaginosis in pregnancy (Primary)  [Z3A.34] 34 weeks gestation of pregnancy  [O47.9] Uterine contractions          ED Disposition Condition    Discharge Stable          ED Prescriptions       Medication Sig Dispense Start Date End Date Auth. Provider    metroNIDAZOLE (FLAGYL) 500 MG tablet  (Status: Discontinued) Take 1 tablet (500 mg total) by mouth every 12 (twelve) hours. for 7 days 14 tablet 11/10/2024 11/10/2024 Alla Kaur MD    metroNIDAZOLE (FLAGYL) 500 MG tablet Take 1 tablet (500 mg total) by mouth every 12 (twelve) hours. for 7 days 14 tablet 11/10/2024 2024 Alla Kaur MD          Follow-up Information       Follow up With Specialties Details Why Contact Info    [facility], [place], operated by [facility] (Obstetrics) Emergency Medicine  If symptoms worsen [address]  [facility] [address]  [phone]    Concepcion Melo MD Obstetrics, Obstetrics and Gynecology  As needed, As scheduled [address]  [facility] [address]  [phone]               Lisbeth Galvez MD  11/14/24 2404

## 2024-11-13 ENCOUNTER — TELEPHONE (OUTPATIENT)
Dept: OBSTETRICS AND GYNECOLOGY | Facility: CLINIC | Age: 24
End: 2024-11-13
Payer: MEDICAID

## 2024-11-13 NOTE — TELEPHONE ENCOUNTER
----- Message from Anthony sent at 11/13/2024  9:01 AM CST -----  Regarding: Self  [phone]  Type: Patient Call Back    Who called: Self     What is the request in detail: called in regards to following up, stating she was supposed to receive a call about her iron infusions but never got a call back. Would like a call back.     Can the clinic reply by [facility]? No     Would the patient rather a call back or a response via [facility]? Call back     Best call back number: [phone]     Additional Information:    Thank you.

## 2024-11-17 ENCOUNTER — PATIENT MESSAGE (OUTPATIENT)
Dept: OTHER | Facility: OTHER | Age: 24
End: 2024-11-17
Payer: MEDICAID

## 2024-11-18 NOTE — PROGRESS NOTES
Pregnancy dating, labs, ultrasound reports, prenatal testing, and problem list; prior records and results; and available outside records were reviewed and updated in EMR.  Pertinent findings were noted below.    Reason for Visit   Routine Prenatal Visit    HPI   24 y.o., at 35w1d by Estimated Date of Delivery: 24    Going to first Venofer infusion today  Wants IOL  Unsure where HSV dx came from    Contractions: Yes   Bleeding: No   Loss of fluid: No   Fetal movement: Yes   Nausea: Yes-has Hpylori   Vomiting: Yes-intermittently   Headache: Yes-resolve     Exam   /68   Wt 74 kg (163 lb 2.3 oz)   LMP 2024   BMI 28.90 kg/m²     TW#  GENERAL: No acute distress  ABD: Gravid    Assessment and Plan   Supervision of high risk pregnancy in third trimester  -     IP OB Labor Induction; Future; Expected date: 12/15/2024  -     Vaginosis Screen by DNA Probe  -     C. trachomatis/N. gonorrhoeae by AMP DNA [facility]; Cervicovaginal  -     Group B Streptococcus, PCR    Genital herpes simplex virus (HSV) infection in mother affecting pregnancy  -     valACYclovir (VALTREX) 500 MG tablet; Take 1 tablet (500 mg total) by mouth 2 (two) times daily.  Dispense: 60 tablet; Refill: 0    Maternal asthma complicating pregnancy    Anemia affecting pregnancy in third trimester    Excessive weight gain during pregnancy, antepartum        Start IV infusions today  GBS/consents today  GC/CT repeat testing today  Patient has Care Everywhere dx 10/2021 with HSV. No labs able to be seen. Counseled patient on recommendation to start Valtrex despite never having outbreak as it is low risk and provides a lot of benefit. Patient in agreement with plan  IOL requested     labor, Pain and bleeding, preE, and fetal movement count precautions given  Follow-up: 1 week

## 2024-11-19 ENCOUNTER — ROUTINE PRENATAL (OUTPATIENT)
Dept: OBSTETRICS AND GYNECOLOGY | Facility: CLINIC | Age: 24
End: 2024-11-19
Attending: OBSTETRICS & GYNECOLOGY
Payer: MEDICAID

## 2024-11-19 ENCOUNTER — INFUSION (OUTPATIENT)
Dept: INFUSION THERAPY | Facility: OTHER | Age: 24
End: 2024-11-19
Attending: NURSE PRACTITIONER
Payer: MEDICAID

## 2024-11-19 VITALS
DIASTOLIC BLOOD PRESSURE: 67 MMHG | OXYGEN SATURATION: 99 % | HEART RATE: 84 BPM | RESPIRATION RATE: 17 BRPM | SYSTOLIC BLOOD PRESSURE: 115 MMHG

## 2024-11-19 VITALS — BODY MASS INDEX: 28.9 KG/M2 | SYSTOLIC BLOOD PRESSURE: 122 MMHG | DIASTOLIC BLOOD PRESSURE: 68 MMHG | WEIGHT: 163.13 LBS

## 2024-11-19 DIAGNOSIS — O99.013 ANEMIA AFFECTING PREGNANCY IN THIRD TRIMESTER: Primary | ICD-10-CM

## 2024-11-19 DIAGNOSIS — O09.93 SUPERVISION OF HIGH RISK PREGNANCY IN THIRD TRIMESTER: Primary | ICD-10-CM

## 2024-11-19 DIAGNOSIS — O99.519 MATERNAL ASTHMA COMPLICATING PREGNANCY: ICD-10-CM

## 2024-11-19 DIAGNOSIS — O26.00 EXCESSIVE WEIGHT GAIN DURING PREGNANCY, ANTEPARTUM: ICD-10-CM

## 2024-11-19 DIAGNOSIS — J45.909 MATERNAL ASTHMA COMPLICATING PREGNANCY: ICD-10-CM

## 2024-11-19 DIAGNOSIS — O98.319 GENITAL HERPES SIMPLEX VIRUS (HSV) INFECTION IN MOTHER AFFECTING PREGNANCY: ICD-10-CM

## 2024-11-19 DIAGNOSIS — O99.013 ANEMIA AFFECTING PREGNANCY IN THIRD TRIMESTER: ICD-10-CM

## 2024-11-19 DIAGNOSIS — A60.09 GENITAL HERPES SIMPLEX VIRUS (HSV) INFECTION IN MOTHER AFFECTING PREGNANCY: ICD-10-CM

## 2024-11-19 PROCEDURE — 99213 OFFICE O/P EST LOW 20 MIN: CPT | Mod: TH,S$PBB,, | Performed by: OBSTETRICS & GYNECOLOGY

## 2024-11-19 PROCEDURE — 0352U VAGINOSIS SCREEN BY DNA PROBE: CPT | Performed by: OBSTETRICS & GYNECOLOGY

## 2024-11-19 PROCEDURE — 99999 PR PBB SHADOW E&M-EST. PATIENT-LVL III: CPT | Mod: PBBFAC,,, | Performed by: OBSTETRICS & GYNECOLOGY

## 2024-11-19 PROCEDURE — 87653 STREP B DNA AMP PROBE: CPT | Performed by: OBSTETRICS & GYNECOLOGY

## 2024-11-19 PROCEDURE — 25000003 PHARM REV CODE 250

## 2024-11-19 PROCEDURE — 99213 OFFICE O/P EST LOW 20 MIN: CPT | Mod: PBBFAC,TH | Performed by: OBSTETRICS & GYNECOLOGY

## 2024-11-19 PROCEDURE — 96374 THER/PROPH/DIAG INJ IV PUSH: CPT

## 2024-11-19 PROCEDURE — 87591 N.GONORRHOEAE DNA AMP PROB: CPT | Performed by: OBSTETRICS & GYNECOLOGY

## 2024-11-19 PROCEDURE — 63600175 PHARM REV CODE 636 W HCPCS

## 2024-11-19 RX ORDER — EPINEPHRINE 0.3 MG/.3ML
0.3 INJECTION SUBCUTANEOUS ONCE AS NEEDED
OUTPATIENT
Start: 2024-11-26

## 2024-11-19 RX ORDER — HEPARIN 100 UNIT/ML
500 SYRINGE INTRAVENOUS
Status: DISCONTINUED | OUTPATIENT
Start: 2024-11-19 | End: 2024-11-19 | Stop reason: HOSPADM

## 2024-11-19 RX ORDER — SODIUM CHLORIDE 0.9 % (FLUSH) 0.9 %
10 SYRINGE (ML) INJECTION
OUTPATIENT
Start: 2024-11-26

## 2024-11-19 RX ORDER — DIPHENHYDRAMINE HYDROCHLORIDE 50 MG/ML
50 INJECTION INTRAMUSCULAR; INTRAVENOUS ONCE AS NEEDED
OUTPATIENT
Start: 2024-11-26

## 2024-11-19 RX ORDER — VALACYCLOVIR HYDROCHLORIDE 500 MG/1
500 TABLET, FILM COATED ORAL 2 TIMES DAILY
Qty: 60 TABLET | Refills: 0 | Status: SHIPPED | OUTPATIENT
Start: 2024-11-19 | End: 2024-12-19

## 2024-11-19 RX ORDER — SODIUM CHLORIDE 0.9 % (FLUSH) 0.9 %
10 SYRINGE (ML) INJECTION
Status: DISCONTINUED | OUTPATIENT
Start: 2024-11-19 | End: 2024-11-19 | Stop reason: HOSPADM

## 2024-11-19 RX ORDER — HEPARIN 100 UNIT/ML
500 SYRINGE INTRAVENOUS
OUTPATIENT
Start: 2024-11-26

## 2024-11-19 RX ADMIN — IRON SUCROSE 200 MG: 20 INJECTION, SOLUTION INTRAVENOUS at 11:11

## 2024-11-19 RX ADMIN — SODIUM CHLORIDE: 9 INJECTION, SOLUTION INTRAVENOUS at 10:11

## 2024-11-19 NOTE — PLAN OF CARE
Problem: Anemia  Goal: Anemia Symptom Improvement  Outcome: Progressing       Patient presented today for venofer infusion. PIV started and infusion given with no issues. VS remained stable throughout visit and assessment provided no issues. PIV removed w/o complications and all questions answered. Patient scheduled for next appt and left clinic in no acute distress.

## 2024-11-20 ENCOUNTER — PATIENT MESSAGE (OUTPATIENT)
Dept: OBSTETRICS AND GYNECOLOGY | Facility: CLINIC | Age: 24
End: 2024-11-20
Payer: MEDICAID

## 2024-11-21 PROBLEM — B95.1 POSITIVE GBS TEST: Status: ACTIVE | Noted: 2024-11-21

## 2024-11-21 LAB
C TRACH DNA SPEC QL NAA+PROBE: NOT DETECTED
GROUP B STREPTOCOCCUS, PCR: POSITIVE
N GONORRHOEA DNA SPEC QL NAA+PROBE: NOT DETECTED

## 2024-11-22 LAB
BACTERIAL VAGINOSIS DNA: DETECTED
CANDIDA GLABRATA/KRUSEI: NOT DETECTED
CANDIDA RRNA VAG QL PROBE: NOT DETECTED
TRICHOMONAS VAGINALIS: NOT DETECTED

## 2024-11-23 ENCOUNTER — PATIENT MESSAGE (OUTPATIENT)
Dept: OBSTETRICS AND GYNECOLOGY | Facility: CLINIC | Age: 24
End: 2024-11-23
Payer: MEDICAID

## 2024-11-25 RX ORDER — METRONIDAZOLE 500 MG/1
500 TABLET ORAL EVERY 12 HOURS
Qty: 14 TABLET | Refills: 0 | Status: SHIPPED | OUTPATIENT
Start: 2024-11-25 | End: 2024-11-26 | Stop reason: SDUPTHER

## 2024-11-26 ENCOUNTER — HOSPITAL ENCOUNTER (OUTPATIENT)
Facility: OTHER | Age: 24
Discharge: HOME | End: 2024-11-27
Attending: OBSTETRICS & GYNECOLOGY | Admitting: OBSTETRICS & GYNECOLOGY
Payer: MEDICAID

## 2024-11-26 DIAGNOSIS — Z3A.36 36 WEEKS GESTATION OF PREGNANCY: ICD-10-CM

## 2024-11-26 DIAGNOSIS — O23.599 BACTERIAL VAGINOSIS IN PREGNANCY: ICD-10-CM

## 2024-11-26 DIAGNOSIS — R10.2 PELVIC PAIN AFFECTING PREGNANCY IN THIRD TRIMESTER, ANTEPARTUM: ICD-10-CM

## 2024-11-26 DIAGNOSIS — B96.89 BACTERIAL VAGINOSIS IN PREGNANCY: ICD-10-CM

## 2024-11-26 DIAGNOSIS — N85.8 ALTERATION IN COMFORT ASSOCIATED WITH UTERINE CONTRACTIONS: Primary | ICD-10-CM

## 2024-11-26 DIAGNOSIS — O47.9 UTERINE CONTRACTIONS DURING PREGNANCY: ICD-10-CM

## 2024-11-26 DIAGNOSIS — O26.893 PELVIC PAIN AFFECTING PREGNANCY IN THIRD TRIMESTER, ANTEPARTUM: ICD-10-CM

## 2024-11-26 DIAGNOSIS — O09.213 CURRENT PREGNANCY WITH HISTORY OF PRE-TERM LABOR IN THIRD TRIMESTER: ICD-10-CM

## 2024-11-26 LAB
ABO + RH BLD: NORMAL
BASOPHILS # BLD AUTO: 0.03 K/UL (ref 0–0.2)
BASOPHILS NFR BLD: 0.2 % (ref 0–1.9)
BILIRUBIN, POC UA: NEGATIVE
BLD GP AB SCN CELLS X3 SERPL QL: NORMAL
BLOOD, POC UA: NEGATIVE
CLARITY, UA: CLEAR
COLOR, UA: YELLOW
DIFFERENTIAL METHOD BLD: ABNORMAL
EOSINOPHIL # BLD AUTO: 0 K/UL (ref 0–0.5)
EOSINOPHIL NFR BLD: 0.2 % (ref 0–8)
ERYTHROCYTE [DISTWIDTH] IN BLOOD BY AUTOMATED COUNT: 14.2 % (ref 11.5–14.5)
GLUCOSE, POC UA: NEGATIVE
HCT VFR BLD AUTO: 31.3 % (ref 37–48.5)
HGB BLD-MCNC: 10.5 G/DL (ref 12–16)
IMM GRANULOCYTES # BLD AUTO: 0.14 K/UL (ref 0–0.04)
IMM GRANULOCYTES NFR BLD AUTO: 1.1 % (ref 0–0.5)
KETONES, POC UA: ABNORMAL
LEUKOCYTE EST, POC UA: NEGATIVE
LYMPHOCYTES # BLD AUTO: 1.2 K/UL (ref 1–4.8)
LYMPHOCYTES NFR BLD: 9.3 % (ref 18–48)
MCH RBC QN AUTO: 30.6 PG (ref 27–31)
MCHC RBC AUTO-ENTMCNC: 33.5 G/DL (ref 32–36)
MCV RBC AUTO: 91 FL (ref 82–98)
MONOCYTES # BLD AUTO: 1 K/UL (ref 0.3–1)
MONOCYTES NFR BLD: 7.4 % (ref 4–15)
NEUTROPHILS # BLD AUTO: 10.9 K/UL (ref 1.8–7.7)
NEUTROPHILS NFR BLD: 81.8 % (ref 38–73)
NITRITE, POC UA: NEGATIVE
NRBC BLD-RTO: 0 /100 WBC
PH UR STRIP: 7 [PH] (ref 5–8)
PLATELET # BLD AUTO: 213 K/UL (ref 150–450)
PMV BLD AUTO: 11.7 FL (ref 9.2–12.9)
PROTEIN, POC UA: NEGATIVE
RBC # BLD AUTO: 3.43 M/UL (ref 4–5.4)
SPECIFIC GRAVITY, POC UA: 1.02 (ref 1–1.03)
SPECIMEN OUTDATE: NORMAL
TREPONEMA PALLIDUM IGG+IGM AB [PRESENCE] IN SERUM OR PLASMA BY IMMUNOASSAY: NONREACTIVE
UROBILINOGEN, POC UA: 1 E.U./DL
WBC # BLD AUTO: 13.33 K/UL (ref 3.9–12.7)

## 2024-11-26 PROCEDURE — 96372 THER/PROPH/DIAG INJ SC/IM: CPT | Performed by: OBSTETRICS & GYNECOLOGY

## 2024-11-26 PROCEDURE — G0378 HOSPITAL OBSERVATION PER HR: HCPCS

## 2024-11-26 PROCEDURE — 63600175 PHARM REV CODE 636 W HCPCS

## 2024-11-26 PROCEDURE — 76815 OB US LIMITED FETUS(S): CPT | Mod: 26,,, | Performed by: OBSTETRICS & GYNECOLOGY

## 2024-11-26 PROCEDURE — 25000003 PHARM REV CODE 250

## 2024-11-26 PROCEDURE — 63600175 PHARM REV CODE 636 W HCPCS: Performed by: OBSTETRICS & GYNECOLOGY

## 2024-11-26 PROCEDURE — 86593 SYPHILIS TEST NON-TREP QUANT: CPT

## 2024-11-26 PROCEDURE — 96361 HYDRATE IV INFUSION ADD-ON: CPT

## 2024-11-26 PROCEDURE — 25000003 PHARM REV CODE 250: Performed by: OBSTETRICS & GYNECOLOGY

## 2024-11-26 PROCEDURE — 59025 FETAL NON-STRESS TEST: CPT

## 2024-11-26 PROCEDURE — 86850 RBC ANTIBODY SCREEN: CPT

## 2024-11-26 PROCEDURE — 99285 EMERGENCY DEPT VISIT HI MDM: CPT | Mod: 25

## 2024-11-26 PROCEDURE — 85025 COMPLETE CBC W/AUTO DIFF WBC: CPT

## 2024-11-26 PROCEDURE — 96374 THER/PROPH/DIAG INJ IV PUSH: CPT

## 2024-11-26 PROCEDURE — 59025 FETAL NON-STRESS TEST: CPT | Mod: 26,,, | Performed by: OBSTETRICS & GYNECOLOGY

## 2024-11-26 PROCEDURE — 99284 EMERGENCY DEPT VISIT MOD MDM: CPT | Mod: ,,, | Performed by: OBSTETRICS & GYNECOLOGY

## 2024-11-26 RX ORDER — DIPHENHYDRAMINE HCL 25 MG
25 CAPSULE ORAL EVERY 4 HOURS PRN
Status: DISCONTINUED | OUTPATIENT
Start: 2024-11-26 | End: 2024-11-27 | Stop reason: HOSPADM

## 2024-11-26 RX ORDER — METRONIDAZOLE 500 MG/1
500 TABLET ORAL ONCE
Status: COMPLETED | OUTPATIENT
Start: 2024-11-26 | End: 2024-11-26

## 2024-11-26 RX ORDER — ACETAMINOPHEN 500 MG
1000 TABLET ORAL EVERY 8 HOURS
Status: DISCONTINUED | OUTPATIENT
Start: 2024-11-26 | End: 2024-11-26

## 2024-11-26 RX ORDER — SIMETHICONE 80 MG
1 TABLET,CHEWABLE ORAL EVERY 6 HOURS PRN
Status: DISCONTINUED | OUTPATIENT
Start: 2024-11-26 | End: 2024-11-27 | Stop reason: HOSPADM

## 2024-11-26 RX ORDER — ONDANSETRON 8 MG/1
8 TABLET, ORALLY DISINTEGRATING ORAL ONCE
Status: COMPLETED | OUTPATIENT
Start: 2024-11-26 | End: 2024-11-26

## 2024-11-26 RX ORDER — HYDROXYZINE PAMOATE 25 MG/1
25 CAPSULE ORAL ONCE
Status: COMPLETED | OUTPATIENT
Start: 2024-11-26 | End: 2024-11-26

## 2024-11-26 RX ORDER — ACETAMINOPHEN 325 MG/1
650 TABLET ORAL EVERY 6 HOURS PRN
Status: DISCONTINUED | OUTPATIENT
Start: 2024-11-26 | End: 2024-11-27 | Stop reason: HOSPADM

## 2024-11-26 RX ORDER — SODIUM CHLORIDE 0.9 % (FLUSH) 0.9 %
10 SYRINGE (ML) INJECTION
Status: DISCONTINUED | OUTPATIENT
Start: 2024-11-26 | End: 2024-11-27 | Stop reason: HOSPADM

## 2024-11-26 RX ORDER — ONDANSETRON 8 MG/1
8 TABLET, ORALLY DISINTEGRATING ORAL EVERY 8 HOURS PRN
Status: DISCONTINUED | OUTPATIENT
Start: 2024-11-26 | End: 2024-11-27 | Stop reason: HOSPADM

## 2024-11-26 RX ORDER — ACETAMINOPHEN 500 MG
1000 TABLET ORAL ONCE
Status: COMPLETED | OUTPATIENT
Start: 2024-11-26 | End: 2024-11-26

## 2024-11-26 RX ORDER — DIPHENHYDRAMINE HYDROCHLORIDE 50 MG/ML
25 INJECTION INTRAMUSCULAR; INTRAVENOUS EVERY 4 HOURS PRN
Status: DISCONTINUED | OUTPATIENT
Start: 2024-11-26 | End: 2024-11-27 | Stop reason: HOSPADM

## 2024-11-26 RX ORDER — TRAMADOL HYDROCHLORIDE 50 MG/1
50 TABLET ORAL ONCE
Status: COMPLETED | OUTPATIENT
Start: 2024-11-26 | End: 2024-11-26

## 2024-11-26 RX ORDER — PRENATAL WITH FERROUS FUM AND FOLIC ACID 3080; 920; 120; 400; 22; 1.84; 3; 20; 10; 1; 12; 200; 27; 25; 2 [IU]/1; [IU]/1; MG/1; [IU]/1; MG/1; MG/1; MG/1; MG/1; MG/1; MG/1; UG/1; MG/1; MG/1; MG/1; MG/1
1 TABLET ORAL DAILY
Status: DISCONTINUED | OUTPATIENT
Start: 2024-11-26 | End: 2024-11-27 | Stop reason: HOSPADM

## 2024-11-26 RX ORDER — ALBUTEROL SULFATE 90 UG/1
2 INHALANT RESPIRATORY (INHALATION) EVERY 4 HOURS PRN
Status: DISCONTINUED | OUTPATIENT
Start: 2024-11-26 | End: 2024-11-27 | Stop reason: HOSPADM

## 2024-11-26 RX ORDER — METRONIDAZOLE 500 MG/1
500 TABLET ORAL 3 TIMES DAILY
Qty: 21 TABLET | Refills: 0 | Status: SHIPPED | OUTPATIENT
Start: 2024-11-26 | End: 2024-11-27

## 2024-11-26 RX ORDER — DIPHENHYDRAMINE HYDROCHLORIDE 50 MG/ML
25 INJECTION INTRAMUSCULAR; INTRAVENOUS ONCE
Status: COMPLETED | OUTPATIENT
Start: 2024-11-26 | End: 2024-11-26

## 2024-11-26 RX ORDER — TERBUTALINE SULFATE 1 MG/ML
0.25 INJECTION SUBCUTANEOUS ONCE
Status: COMPLETED | OUTPATIENT
Start: 2024-11-26 | End: 2024-11-26

## 2024-11-26 RX ORDER — AMOXICILLIN 250 MG
1 CAPSULE ORAL NIGHTLY PRN
Status: DISCONTINUED | OUTPATIENT
Start: 2024-11-26 | End: 2024-11-27 | Stop reason: HOSPADM

## 2024-11-26 RX ADMIN — TERBUTALINE SULFATE 0.25 MG: 1 INJECTION SUBCUTANEOUS at 12:11

## 2024-11-26 RX ADMIN — METRONIDAZOLE 500 MG: 500 TABLET ORAL at 06:11

## 2024-11-26 RX ADMIN — ONDANSETRON 8 MG: 8 TABLET, ORALLY DISINTEGRATING ORAL at 06:11

## 2024-11-26 RX ADMIN — ACETAMINOPHEN 1000 MG: 500 TABLET, FILM COATED ORAL at 07:11

## 2024-11-26 RX ADMIN — HYDROXYZINE PAMOATE 25 MG: 25 CAPSULE ORAL at 10:11

## 2024-11-26 RX ADMIN — DIPHENHYDRAMINE HYDROCHLORIDE 25 MG: 25 CAPSULE ORAL at 09:11

## 2024-11-26 RX ADMIN — ACETAMINOPHEN 1000 MG: 500 TABLET, FILM COATED ORAL at 06:11

## 2024-11-26 RX ADMIN — SODIUM CHLORIDE, SODIUM LACTATE, POTASSIUM CHLORIDE, AND CALCIUM CHLORIDE 1000 ML: .6; .31; .03; .02 INJECTION, SOLUTION INTRAVENOUS at 08:11

## 2024-11-26 RX ADMIN — DIPHENHYDRAMINE HYDROCHLORIDE 25 MG: 50 INJECTION, SOLUTION INTRAMUSCULAR; INTRAVENOUS at 10:11

## 2024-11-26 RX ADMIN — TRAMADOL HYDROCHLORIDE 50 MG: 50 TABLET, COATED ORAL at 03:11

## 2024-11-26 NOTE — H&P
HISTORY AND PHYSICAL                                                OBSTETRICS          Subjective:      Ellen Cardenas is a 24 y.o. female  @ 36w2d presenting for painful contractions. Pt reports contractions started just after midnight , along with a mild headache. She has not tried any medications for discomfort and has not been drinking water during this time. She reports contractions have increased in frequency. She denies VB, LOF, endorses good fetal movement.      During stay in [facility], patient reports contractions increased in intensity & she reported new vaginal pressure. SVE unchanged over multiple checks. Pain unrelieved with tylenol & IV benadryl, patient has morphine allergy so morphine rest not an option.     Decision made to admit to observation for threatened pre term labor pending improvement in contraction frequency and pain. Consents signed.      Review of Systems   Constitutional:  Negative for chills and fever.   HENT:  Negative for nasal congestion and mouth sores.    Eyes:  Negative for visual disturbance.   Respiratory:  Negative for cough and shortness of breath.    Cardiovascular:  Negative for chest pain, palpitations and leg swelling.   Gastrointestinal:  Positive for abdominal pain (contractions). Negative for constipation, diarrhea, nausea and vomiting.   Genitourinary:  Positive for vaginal pain (pressure). Negative for dysuria, pelvic pain, vaginal bleeding and vaginal discharge.   Musculoskeletal:  Negative for joint swelling.   Integumentary:  Negative for rash.   Neurological:  Negative for syncope, numbness and headaches.   Psychiatric/Behavioral:  The patient is not nervous/anxious.        PMHx:   Past Medical History:   Diagnosis Date    Asthma     childhood    Constipation     Depression     Ectopic pregnancy        PSHx:   Past Surgical History:   Procedure Laterality Date    EXPLORATORY LAPAROTOMY      ECTOPIC    SALPINGECTOMY Left 2019    Procedure:  SALPINGECTOMY;  Surgeon: Felecia Duran MD;  Location: [facility] OR;  Service: OB/GYN;  Laterality: Left;       All:   Review of patient's allergies indicates:   Allergen Reactions    Latex, natural rubber Itching    Opioids - morphine analogues Hives       Meds:   Medications Prior to Admission   Medication Sig Dispense Refill Last Dose/Taking    albuterol (PROVENTIL/VENTOLIN HFA) 90 mcg/actuation inhaler        cetirizine (ZYRTEC) 10 MG tablet Take 1 tablet (10 mg total) by mouth once daily. 30 tablet 0     docusate sodium (COLACE) 100 MG capsule Take 1 capsule (100 mg total) by mouth 2 (two) times daily. 60 capsule 2     famotidine (PEPCID) 20 MG tablet Take 1 tablet (20 mg total) by mouth 2 (two) times daily. 60 tablet 1     ferrous sulfate 325 (65 FE) MG EC tablet Take 1 tablet (325 mg total) by mouth once daily. 60 tablet 2     FLUoxetine 10 MG Tab Take 1 tablet (10 mg total) by mouth once daily. 30 tablet 0     fluticasone propionate (FLONASE) 50 mcg/actuation nasal spray 1 spray (50 mcg total) by Each Nostril route 2 (two) times daily as needed for Rhinitis. 15 g 0     omeprazole (PRILOSEC) 20 MG capsule Take 1 capsule (20 mg total) by mouth once daily. 90 capsule 3     ondansetron (ZOFRAN-ODT) 4 MG TbDL Take 1 tablet (4 mg total) by mouth every 6 (six) hours as needed (for nausea). 20 tablet 0     prenatal 26-iron ps-folic-dha (VITAFOL-ONE) 29 mg iron- 1 mg-200 mg Cap Take 1 capsule by mouth once daily. 30 capsule 12     promethazine (PHENERGAN) 25 MG tablet Take 1 tablet (25 mg total) by mouth every 6 (six) hours as needed for Nausea. 15 tablet 0     sumatriptan (IMITREX) 50 MG tablet Take 50 mg by mouth.       valACYclovir (VALTREX) 500 MG tablet Take 1 tablet (500 mg total) by mouth 2 (two) times daily. 60 tablet 0        SH:   Social History     Socioeconomic History    Marital status: Single   Tobacco Use    Smoking status: Never    Smokeless tobacco: Never   Substance and Sexual Activity    Alcohol use:  "Not Currently     Comment: OCCASIONAL    Drug use: Not Currently     Types: Marijuana    Sexual activity: Yes     Partners: Male     Social Drivers of Health     Financial Resource Strain: Medium Risk (6/10/2024)    Overall Financial Resource Strain (CARDIA)     Difficulty of Paying Living Expenses: Somewhat hard   Food Insecurity: Food Insecurity Present (6/10/2024)    Hunger Vital Sign     Worried About Running Out of Food in the Last Year: Sometimes true     Ran Out of Food in the Last Year: Sometimes true   Transportation Needs: Unknown (2023)    PRAPARE - Transportation     Lack of Transportation (Medical): No   Physical Activity: Insufficiently Active (6/10/2024)    Exercise Vital Sign     Days of Exercise per Week: 2 days     Minutes of Exercise per Session: 60 min   Stress: Stress Concern Present (6/10/2024)    [employer] [place] [employer] - Occupational Stress Questionnaire     Feeling of Stress : To some extent   Housing Stability: High Risk (2023)    Housing Stability Vital Sign     Unable to Pay for Housing in the Last Year: Yes     Unstable Housing in the Last Year: No       FH:   Family History   Problem Relation Name Age of Onset    Breast cancer Neg Hx      Colon cancer Neg Hx      Ovarian cancer Neg Hx         OBHx:   OB History    Para Term  AB Living   3 0 0 0 2 0   SAB IAB Ectopic Multiple Live Births   0 0 2 0 0      # Outcome Date GA Lbr [place]um/2nd Weight Sex Type Anes PTL Lv   3 Current            2 Ectopic            1 Ectopic                Objective:       BP (!) 91/54   Pulse 90   Temp 98.6 °F (37 °C) (Oral)   Resp 18   Ht 5' 3" (1.6 m)   Wt 74 kg (163 lb 2.3 oz)   LMP 2024   SpO2 98%   BMI 28.90 kg/m²     Vitals:    24 1537 24 1539 24 1542 24 1543   BP:       Pulse: 81 85 88 90   Resp:       Temp:       TempSrc:       SpO2: 100%  98%    Weight:       Height:           General: NAD, alert, oriented, cooperative  HEENT: " NCAT, EOM grossly intact  Lungs: Normal WOB  Heart: regular rate  Abdomen: gravid, soft, nondistended, nontender, no rebound or guarding  Extremities: no edema    FHT: 130 bpm, moderate BTBV, +accels, -decels; Cat 1 (reassuring)  [place]: q 1-2mins  Presentation: cephalic by ultrasound    Cervix: ft/50/-3    EFW by Leopold's: 6    Maternal pelvis not yet proven    Lab Review  Blood Type O POS  GBBS: positive  Rubella: Immune  Treponemal Ab: negative  HIV: negative  HepB: negative       Assessment:       36w2d weeks gestation admitted to observation for painful contractions.    Active Hospital Problems    Diagnosis  POA    *Current pregnancy with history of pre-term labor in third trimester [O09.213]  Not Applicable    Positive GBS test [B95.1]  Yes    Anemia affecting pregnancy in third trimester [O99.013]  Yes    Single pelvic kidney [Q63.2]  Not Applicable    Genital herpes simplex virus (HSV) infection in mother affecting pregnancy [O98.319, A60.09]  Yes    Maternal asthma complicating pregnancy [O99.519, J45.909]  Yes    Gastroesophageal reflux disease without esophagitis [K21.9]  Yes    Anxiety [F41.9]  Yes    Sexually transmitted disease (STD)-GC and chlam treated  03/04/24 outside clinic [A64]  Yes    Depression [F32.A]  Yes      Resolved Hospital Problems   No resolved problems to display.          Plan:     Painful contractions   Risks, benefits, alternatives and possible complications have been discussed in detail with the patient.   - Consents signed and to chart  - Admit to Observation  - Administer terbutaline for tocolysis   - Draw CBC, T&S, Trep  - Staff notified  - Recheck in 2-4 hrs or PRN  - Continuous monitoring  - Contraception: ppIUD if laboring     2. HSV  - denies any oral or genital lesions ever  - 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    3. GBS positive   - start PCN if cervical change     Deb Edmond MD  OB/GYN PGY-2

## 2024-11-26 NOTE — ED PROVIDER NOTES
Encounter Date: 2024       History     Chief Complaint   Patient presents with    Contractions     Ellen Cardenas is a 24 y.o. female  @ 36w2d presenting for contractions. Pt reports contractions started at 1am, along with a mild headache. She has not tried any medications for discomfort and has not been drinking water during this time. She reports contractions have increased in frequency. She denies VB, LOF, reports decreased fetal movement prior to presentation that has now improved.     Patient denies dizziness, visual changes, chest pain, shortness of breath, nausea or vomiting and right upper quadrant pain      Patient reports she has been diagnosed with BV in clinic, however has not picked up Rx. She is amenable to starting a dose now, then agrees to  Rx at desired prescription        Review of patient's allergies indicates:   Allergen Reactions    Latex, natural rubber Itching    Opioids - morphine analogues Hives     Past Medical History:   Diagnosis Date    Asthma     childhood    Constipation     Depression     Ectopic pregnancy      Past Surgical History:   Procedure Laterality Date    EXPLORATORY LAPAROTOMY      ECTOPIC    SALPINGECTOMY Left 2019    Procedure: SALPINGECTOMY;  Surgeon: Felecia Duran MD;  Location: [facility] OR;  Service: OB/GYN;  Laterality: Left;     Family History   Problem Relation Name Age of Onset    Breast cancer Neg Hx      Colon cancer Neg Hx      Ovarian cancer Neg Hx       Social History     Tobacco Use    Smoking status: Never    Smokeless tobacco: Never   Substance Use Topics    Alcohol use: Not Currently     Comment: OCCASIONAL    Drug use: Not Currently     Types: Marijuana     Review of Systems   Constitutional:  Negative for fever.   HENT:  Negative for sore throat.    Respiratory:  Negative for shortness of breath.    Cardiovascular:  Negative for chest pain.   Gastrointestinal:  Negative for nausea.   Genitourinary:  Negative for dysuria.    Musculoskeletal:  Negative for back pain.   Skin:  Negative for rash.   Neurological:  Negative for weakness.   Hematological:  Does not bruise/bleed easily.       Physical Exam     Initial Vitals [11/26/24 0551]   BP Pulse Resp Temp SpO2   124/72 80 18 97.3 °F (36.3 °C) 100 %      MAP       --         Physical Exam    Vitals reviewed.  Constitutional: She appears well-developed and well-nourished.   HENT:   Head: Normocephalic.   Eyes: Conjunctivae are normal. Pupils are equal, round, and reactive to light.   Neck:   Normal range of motion.  Cardiovascular:  Normal rate and intact distal pulses.           Pulmonary/Chest: Breath sounds normal. No respiratory distress. She exhibits no tenderness.   Abdominal: Abdomen is soft. Bowel sounds are normal.   Musculoskeletal:         General: No tenderness or edema. Normal range of motion.      Cervical back: Normal range of motion.     Neurological: She is alert and oriented to person, place, and time.   Skin: Skin is warm and dry.   Psychiatric: She has a normal mood and affect. Her behavior is normal. Thought content normal.     OB LABOR EXAM:       Method: Sterile vaginal exam per MD.   Vaginal Bleeding: none present.           Amniotic Fluid Color: no fluid.     Comments: Ft/th/hi/anterior/soft  Unchanged on repeat exam 2 hrs later       ED Course   Fetal non-stress test    Date/Time: 11/26/2024 6:13 AM    Performed by: Anna Grullon MD  Authorized by: Anna Grullon MD    Nonstress Test:     Variability:  6-25 BPM    Decelerations:  None    Accelerations:  15 bpm    Baseline:  130    Contractions:  Regular    Contraction Frequency:  Q1-2 min  Biophysical Profile:     Nonstress Test Interpretation: reactive      Overall Impression:  Reassuring  Post-procedure:     Patient tolerance:  Patient tolerated the procedure well with no immediate complications    Labs Reviewed   POCT URINALYSIS W/O SCOPE - Abnormal       Result Value    Spec Grav UA  1.025      PH, UA 7.0      Protein, UA Negative      Glucose, UA Negative      Ketones, UA Trace (*)     Bilirubin, UA Negative      Blood, UA Negative      Leukocytes, UA Negative      Nitrite, UA Negative      Urobilinogen, UA 1.0      Color, UA POC Yellow      Clarity, UA, POC Clear            Imaging Results    None          Medications   albuterol inhaler 2 puff (has no administration in time range)   sodium chloride 0.9% flush 10 mL (has no administration in time range)   acetaminophen tablet 650 mg (has no administration in time range)   ondansetron disintegrating tablet 8 mg (has no administration in time range)   senna-docusate 8.6-50 mg per tablet 1 tablet (has no administration in time range)   simethicone chewable tablet 80 mg (has no administration in time range)   diphenhydrAMINE capsule 25 mg (has no administration in time range)   diphenhydrAMINE injection 25 mg (has no administration in time range)   prenatal vitamin oral tablet (1 tablet Oral Not Given 24 1215)   traMADoL tablet 50 mg (has no administration in time range)   metroNIDAZOLE tablet 500 mg (500 mg Oral Given 24 0634)   ondansetron disintegrating tablet 8 mg (8 mg Oral Given 24 0618)   lactated ringers bolus 1,000 mL (0 mLs Intravenous Stopped 24 1002)   diphenhydrAMINE injection 25 mg (25 mg Intravenous Given 24 1006)   terbutaline injection 0.25 mg (0.25 mg Subcutaneous Given 24 1234)     Medical Decision Making  Ellen Cardenas is a 24 y.o. female  @ 36w2d presenting for contractions    Contractions  - SVE @ 0600: //hi/ant/soft  - [place]: q1-2 min  - plan for tylenol, hydration, dose of flagyl  - Repeat SVE 2 hrs later unchanged, but pt reports pain worse and ctx remain Q 1-2 mins  - Benadryl and IV hydration provided w/o relief  - Pt with allergy to morphine, discussed dose of terb and observation and pt is amenable  - 0.25mg terb given IM    Headache  - tylenol 1000mg given at  0615  - normotensive    Bacterial vaginosis  - diagnosed in clinic, did not  Rx  - Prescription sent to desired pharmacy    HSV  - denies any oral or genital lesions ever  - 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     Pt placed in observation pending improvement in contraction frequency and pain. Consents signed.     Camilla Parish MD  OB Hospitalist    Risk  OTC drugs.  Prescription drug management.                                      Clinical Impression:  Final diagnoses:  [Z3A.36] 36 weeks gestation of pregnancy  [O23.599, B96.89] Bacterial vaginosis in pregnancy  [O47.9] Uterine contractions during pregnancy (Primary)          ED Disposition Condition    Send to AKI&Camilla Bennett MD  11/26/24 1678

## 2024-11-26 NOTE — ED NOTES
24 y.o.  36w2d by 8wk US  HD: 2  Dx: tPTL, GBS Pos, Anemia, Depression/Anxiety, CT in 1T (Neg LINDA), H/o HSV (Neg SSE?), Asthma, Pelvic Kidney, H/o Ex Lap (Ectopic)     PPHRisk: low  SVE: ft/50/-3 @ 1000   Mem: I  US: vtx, **g @ **w**d  Meds: Tylenol, Flagyl, Terb (6960), PNV    BC: ppIUD      Labs:O POS/RI/HIVneg/Trepneg/GBBSpos  10/1/214  Diet: Regular    Mon: Continuous        [ ] OB - [facility]  [ ] PCN if laboring  [ ] Order/ IUD if laboring  [ ] AW Growth, H&P

## 2024-11-27 ENCOUNTER — TELEPHONE (OUTPATIENT)
Dept: OBSTETRICS AND GYNECOLOGY | Facility: OTHER | Age: 24
End: 2024-11-27
Payer: MEDICAID

## 2024-11-27 ENCOUNTER — INFUSION (OUTPATIENT)
Dept: INFUSION THERAPY | Facility: OTHER | Age: 24
End: 2024-11-27
Attending: NURSE PRACTITIONER
Payer: MEDICAID

## 2024-11-27 ENCOUNTER — HOSPITAL ENCOUNTER (EMERGENCY)
Facility: OTHER | Age: 24
Discharge: HOME | End: 2024-11-28
Attending: OBSTETRICS & GYNECOLOGY
Payer: MEDICAID

## 2024-11-27 VITALS
HEIGHT: 63 IN | TEMPERATURE: 98 F | SYSTOLIC BLOOD PRESSURE: 109 MMHG | OXYGEN SATURATION: 98 % | BODY MASS INDEX: 28.9 KG/M2 | WEIGHT: 163.13 LBS | HEART RATE: 71 BPM | DIASTOLIC BLOOD PRESSURE: 62 MMHG | RESPIRATION RATE: 12 BRPM

## 2024-11-27 VITALS
SYSTOLIC BLOOD PRESSURE: 109 MMHG | OXYGEN SATURATION: 99 % | RESPIRATION RATE: 16 BRPM | HEART RATE: 69 BPM | DIASTOLIC BLOOD PRESSURE: 63 MMHG

## 2024-11-27 DIAGNOSIS — O47.9 UTERINE CONTRACTIONS DURING PREGNANCY: Primary | ICD-10-CM

## 2024-11-27 DIAGNOSIS — O99.013 ANEMIA AFFECTING PREGNANCY IN THIRD TRIMESTER: Primary | ICD-10-CM

## 2024-11-27 DIAGNOSIS — Z3A.36 36 WEEKS GESTATION OF PREGNANCY: ICD-10-CM

## 2024-11-27 PROBLEM — N85.8 ALTERATION IN COMFORT ASSOCIATED WITH UTERINE CONTRACTIONS: Status: ACTIVE | Noted: 2024-11-27

## 2024-11-27 PROCEDURE — 25000003 PHARM REV CODE 250

## 2024-11-27 PROCEDURE — G0378 HOSPITAL OBSERVATION PER HR: HCPCS

## 2024-11-27 PROCEDURE — 99238 HOSP IP/OBS DSCHRG MGMT 30/<: CPT | Mod: ,,, | Performed by: OBSTETRICS & GYNECOLOGY

## 2024-11-27 PROCEDURE — 96374 THER/PROPH/DIAG INJ IV PUSH: CPT

## 2024-11-27 PROCEDURE — 99284 EMERGENCY DEPT VISIT MOD MDM: CPT | Mod: 25

## 2024-11-27 PROCEDURE — 63600175 PHARM REV CODE 636 W HCPCS

## 2024-11-27 RX ORDER — DIPHENHYDRAMINE HYDROCHLORIDE 50 MG/ML
50 INJECTION INTRAMUSCULAR; INTRAVENOUS ONCE AS NEEDED
Status: DISCONTINUED | OUTPATIENT
Start: 2024-11-27 | End: 2024-11-27 | Stop reason: HOSPADM

## 2024-11-27 RX ORDER — SODIUM CHLORIDE 0.9 % (FLUSH) 0.9 %
10 SYRINGE (ML) INJECTION
Status: DISCONTINUED | OUTPATIENT
Start: 2024-11-27 | End: 2024-11-27 | Stop reason: HOSPADM

## 2024-11-27 RX ORDER — ACETAMINOPHEN 500 MG
1000 TABLET ORAL EVERY 8 HOURS PRN
Qty: 60 TABLET | Refills: 2 | Status: SHIPPED | OUTPATIENT
Start: 2024-11-27

## 2024-11-27 RX ORDER — HEPARIN 100 UNIT/ML
500 SYRINGE INTRAVENOUS
Status: DISCONTINUED | OUTPATIENT
Start: 2024-11-27 | End: 2024-11-27 | Stop reason: HOSPADM

## 2024-11-27 RX ORDER — DIPHENHYDRAMINE HYDROCHLORIDE 50 MG/ML
50 INJECTION INTRAMUSCULAR; INTRAVENOUS ONCE AS NEEDED
OUTPATIENT
Start: 2024-12-03

## 2024-11-27 RX ORDER — TRAMADOL HYDROCHLORIDE 50 MG/1
50 TABLET ORAL ONCE
Status: COMPLETED | OUTPATIENT
Start: 2024-11-27 | End: 2024-11-27

## 2024-11-27 RX ORDER — EPINEPHRINE 0.3 MG/.3ML
0.3 INJECTION SUBCUTANEOUS ONCE AS NEEDED
OUTPATIENT
Start: 2024-12-03

## 2024-11-27 RX ORDER — METRONIDAZOLE 500 MG/1
500 TABLET ORAL EVERY 12 HOURS
Qty: 14 TABLET | Refills: 0 | Status: SHIPPED | OUTPATIENT
Start: 2024-11-27 | End: 2024-12-04

## 2024-11-27 RX ORDER — SODIUM CHLORIDE 0.9 % (FLUSH) 0.9 %
10 SYRINGE (ML) INJECTION
OUTPATIENT
Start: 2024-12-03

## 2024-11-27 RX ORDER — EPINEPHRINE 0.3 MG/.3ML
0.3 INJECTION SUBCUTANEOUS ONCE AS NEEDED
Status: DISCONTINUED | OUTPATIENT
Start: 2024-11-27 | End: 2024-11-27 | Stop reason: HOSPADM

## 2024-11-27 RX ORDER — HEPARIN 100 UNIT/ML
500 SYRINGE INTRAVENOUS
OUTPATIENT
Start: 2024-12-03

## 2024-11-27 RX ADMIN — PRENATAL VIT W/ FE FUMARATE-FA TAB 27-0.8 MG 1 TABLET: 27-0.8 TAB at 09:11

## 2024-11-27 RX ADMIN — IRON SUCROSE 200 MG: 20 INJECTION, SOLUTION INTRAVENOUS at 02:11

## 2024-11-27 RX ADMIN — TRAMADOL HYDROCHLORIDE 50 MG: 50 TABLET, COATED ORAL at 10:11

## 2024-11-27 RX ADMIN — TRAMADOL HYDROCHLORIDE 50 MG: 50 TABLET, COATED ORAL at 07:11

## 2024-11-27 NOTE — PROGRESS NOTES
Pt reports feeling contractions again. TOCO and EFM adjusted. Pt requested to stay on a little longer. Dr Morales notified of contractions and mild vaginal pressure. Gave pt 1g of tylenol and will continue to monitor pt.

## 2024-11-27 NOTE — PLAN OF CARE
Md notified of pt complaints for abdominal pain. Per MD put pt back onto CEFm for now, SVE still unchanged. Pt given heat packs, offered tylenol but refused. Will continue to monitor pt.     Problem:  Fall Injury Risk  Goal: Absence of Fall, Infant Drop and Related Injury  Outcome: Progressing     Problem: Adult Inpatient Plan of Care  Goal: Plan of Care Review  Outcome: Progressing  Goal: Patient-Specific Goal (Individualized)  Outcome: Progressing  Goal: Absence of Hospital-Acquired Illness or Injury  Outcome: Progressing  Goal: Optimal Comfort and Wellbeing  Outcome: Progressing  Goal: Readiness for Transition of Care  Outcome: Progressing     Problem: Pain Acute  Goal: Optimal Pain Control and Function  Outcome: Progressing     Problem: Hospitalized  Patient  Goal: Optimal Patient-Fetal Wellbeing  Outcome: Progressing

## 2024-11-27 NOTE — PLAN OF CARE
Vital Signs Stable, No apparent distress noted; Pt tolerated __Venofer w/o difficulty.  24g piv removed;No bleeding,swelling or drainage noted to the site.  AVS/Discharge instructions reviewed;all questions answered ;Pt verbalizes understanding. Next appointments scheduled;ambulatory from clinic in [place]

## 2024-11-27 NOTE — PROGRESS NOTES
SW received a consult for limited resources and discharge planning. SW was notified by provider that pt is aware that SW consult was placed.SW met with pt at bedside to inquire. SW met with pt at bedside. Pt appeared to be alert and well oriented. SW informed pt of the role as the SW and the services/resources that can be provided. Pt had questions regarding resources. SW provided pt with a mother/baby resource Packet which includes: Family Connects, Healthy Start, [place] Parent Line, [employer] Post Partum Depression, [facility] Family [facility], Parent Handout: Adverse Childhood Experiences, Parenting Tips Birth to 1 Year, Parents as Teachers, Women's [facility] (maternity and infant supplies, [facility] Pediatrician list, SNAP, WIC, Family Connects, and more. SW informed pt that majority of these resources (more specifically the agencies that will provide them with diapers, wipes, formula etc.) each require a birth letter or birth certificate. Therefore, we wait until after the baby is born to receive a birth letter. SW informed pt that a referral was placed for Healthy Start and Family [employer]. SW notified pt to contact her when she is induced. Pt expressed understanding. SW will follow up.      SW Completed Home/Discharge Assessment:  Do you have a car seat: Yes  Do you have a bassinet: Yes  Do you have bottles: No  DO you have wipes: Yes  Do you have diapers: No  Do you have clothes:  No  How are you getting home: Sister    How will the baby get to pediatric visits: Family member   Pediatrician: No  Nutrition Plan: Breat feeding & Formula   Do you have WIC: Appointment on Dec 11  Do you have SNAP: Yes  Support Person:  Nicki Gama  [phone] (mother)  Support Person: Osvaldo Cardenas [phone] (FOB)

## 2024-11-27 NOTE — DISCHARGE SUMMARY
[facility] - Antepartum  Obstetrics & Gynecology  Discharge Summary    Patient Name: Ellen Cardenas  MRN: [id number]  Admission Date: 2024  Hospital Length of Stay: 0 days  Discharge Date and Time:  2024  Attending Physician: Justin Disla MD   Discharging Provider: Deb Edmond MD  Primary Care Provider: Main King MD (Inactive)    HPI: Ellen Cardenas is a 24 y.o. female  @ 36w2d presenting for painful contractions. Pt reports contractions started just after midnight , along with a mild headache. She has not tried any medications for discomfort and has not been drinking water during this time. She reports contractions have increased in frequency. She denies VB, LOF, endorses good fetal movement.      During stay in [place], patient reports contractions increased in intensity & she reported new vaginal pressure. SVE unchanged over multiple checks. Pain unrelieved with tylenol & IV benadryl, patient has morphine allergy so morphine rest not an option.      Decision made to admit to observation for threatened pre term labor pending improvement in contraction frequency and pain.    Hospital Course:   24: Admitted to observation for painful uterine contractions. Administered terbutalines with some pain relief. Patient allergic to morphine (hives) and cannot do morphine rest. Discussed with anesthesia who recommended tramadol, patient experienced pain relief. SVE unchanged: ft/50/-3  24: Patient called out several times overnight with increasing cramping. SVE unchanged. Administered AM dose of tramadol with good pain relief. Discussed supportive care (belly support band & PT). SW met with patient and assessed needs & completed DC planning. Patient stable for DC.     * No surgery found *     Consults:   Consults (From admission, onward)          Status Ordering Provider     Inpatient consult to Social Work  Once        Provider:  (Not yet assigned)    Acknowledged SARAHY  BENJIE            Significant Diagnostic Studies: N/A    Pending Diagnostic Studies:       None          Final Active Diagnoses:    Diagnosis Date Noted POA    PRINCIPAL PROBLEM:  Alteration in comfort associated with uterine contractions [N85.8] 11/27/2024 Yes    Current pregnancy with history of pre-term labor in third trimester [O09.213] 11/26/2024 Not Applicable    Positive GBS test [B95.1] 11/21/2024 Yes    Anemia affecting pregnancy in third trimester [O99.013] 10/08/2024 Yes    Single pelvic kidney [Q63.2] 08/27/2024 Not Applicable    Genital herpes simplex virus (HSV) infection in mother affecting pregnancy [O98.319, A60.09] 06/04/2024 Yes    Maternal asthma complicating pregnancy [O99.519, J45.909] 06/04/2024 Yes    Gastroesophageal reflux disease without esophagitis [K21.9] 05/13/2020 Yes    Anxiety [F41.9] 02/11/2020 Yes    Sexually transmitted disease (STD)-GC and chlam treated  03/04/24 outside clinic [A64] 09/26/2016 Yes    Depression [F32.A] 08/04/2015 Yes      Problems Resolved During this Admission:        Discharged Condition: good     Follow-up Information       Concepcion Melo MD Follow up on 12/3/2024.    Specialties: Obstetrics, Obstetrics and Gynecology  Why: Hospital follow up/routine prenatal visit  Contact information:  [address] [place] [address]  [phone]                           Patient Instructions:      Ambulatory referral/consult to Physical/Occupational Therapy   Standing Status: Future   Referral Priority: Routine Referral Type: Physical Medicine   Referral Reason: Specialty Services Required   Number of Visits Requested: 1     Diet Adult Regular     Pelvic Rest   Order Comments: Pelvic rest until 6 weeks after delivery. Nothing in vagina -no sex, tampons, douching, etc.     Notify your health care provider if you experience any of the following:  temperature >100.4     Notify your health care provider if you experience any of the following:  persistent  nausea and vomiting or diarrhea     Notify your health care provider if you experience any of the following:  severe uncontrolled pain     Notify your health care provider if you experience any of the following:  redness, tenderness, or signs of infection (pain, swelling, redness, odor or green/yellow discharge around incision site)     Notify your health care provider if you experience any of the following:  difficulty breathing or increased cough     Notify your health care provider if you experience any of the following:  severe persistent headache     Notify your health care provider if you experience any of the following:  worsening rash     Notify your health care provider if you experience any of the following:  persistent dizziness, light-headedness, or visual disturbances     Notify your health care provider if you experience any of the following:  increased confusion or weakness     Notify your health care provider if you experience any of the following:   Order Comments: Heavy vaginal bleeding saturating more than 1 pad per hr for at least consecutive 2 hrs.     No driving until:   Order Comments: No driving until not taking narcotic pain medication.     Activity as tolerated     Medications:  Reconciled Home Medications:      Medication List        START taking these medications      acetaminophen 500 MG tablet  Commonly known as: TYLENOL  Take 2 tablets (1,000 mg total) by mouth every 8 (eight) hours as needed for Pain.            CONTINUE taking these medications      albuterol 90 mcg/actuation inhaler  Commonly known as: PROVENTIL/VENTOLIN HFA     cetirizine 10 MG tablet  Commonly known as: ZYRTEC  Take 1 tablet (10 mg total) by mouth once daily.     docusate sodium 100 MG capsule  Commonly known as: COLACE  Take 1 capsule (100 mg total) by mouth 2 (two) times daily.     famotidine 20 MG tablet  Commonly known as: PEPCID  Take 1 tablet (20 mg total) by mouth 2 (two) times daily.     ferrous sulfate  325 (65 FE) MG EC tablet  Take 1 tablet (325 mg total) by mouth once daily.     FLUoxetine 10 MG Tab  Take 1 tablet (10 mg total) by mouth once daily.     fluticasone propionate 50 mcg/actuation nasal spray  Commonly known as: FLONASE  1 spray (50 mcg total) by Each Nostril route 2 (two) times daily as needed for Rhinitis.     metroNIDAZOLE 500 MG tablet  Commonly known as: FLAGYL  Take 1 tablet (500 mg total) by mouth every 12 (twelve) hours. for 7 days     omeprazole 20 MG capsule  Commonly known as: PRILOSEC  Take 1 capsule (20 mg total) by mouth once daily.     ondansetron 4 MG Tbdl  Commonly known as: ZOFRAN-ODT  Take 1 tablet (4 mg total) by mouth every 6 (six) hours as needed (for nausea).     prenatal 26-iron ps-folic-dha 29 mg iron- 1 mg-200 mg Cap  Commonly known as: VITAFOL-ONE  Take 1 capsule by mouth once daily.     promethazine 25 MG tablet  Commonly known as: PHENERGAN  Take 1 tablet (25 mg total) by mouth every 6 (six) hours as needed for Nausea.     sumatriptan 50 MG tablet  Commonly known as: IMITREX  Take 50 mg by mouth.     valACYclovir 500 MG tablet  Commonly known as: VALTREX  Take 1 tablet (500 mg total) by mouth 2 (two) times daily.              Deb Edmond MD  Obstetrics & Gynecology  [facility] - Antepartum

## 2024-11-27 NOTE — CARE UPDATE
MD to bedside to assess patient after receiving tramadol at 1515. Upon entry to room, patient resting comfortably in bed eating dinner. She reports pain is much improved from this AM. She states she feels irregular cramping pain and vaginal pressure has improved as well. She denies LOF, VB, and endorses good fetal movement.     FHT: 120 bpm, mod BTBV, + accels, - decels, R&R  TOCO: uterine irritability     - VSS & WNL  - Discussed with patient that she is stable for DC given pain is improved & she is no longer yoko on TOCO   - Patient reports she lives alone and is concerned discharging tonight   - Offered to let patient stay in the hospital overnight, patient in agreement  - Plan to continue continuous monitoring until 1930 then will transition to NST BID   - Plan to assess patients pain & repeat SVE in AM   - Instructed patient to call out with any increase in pain, vaginal bleeding or concern for LOF  - Discussed increasing PO hydration, 1g tylenol PRN, warm baths/hot showers, and belly support band for supportive care after DC    - All questions answered, patient verbalized understanding     Deb Edmond MD  OB/GYN PGY-2

## 2024-11-27 NOTE — CARE UPDATE
MD to bedside to perform growth US. Terbutaline given at 1230. Upon entry to room, patient resting in bed. She states pain is less severe than previous, but is still present. She is not sure how often she is feeling contractions.     FHT: 130 bpm, mod BTBV, + accels, - decels, R&R  TOCO: irregular contractions, difficult to discern on TOCO    SVE unchanged, ft/50/-3.     US: 2379g, subjectively adequate fluid    - VSS & WNL  - SVE unchanged  - Patient endorses some relief s/p terbutaline  - Discussed patients morphine allergy with anesthesia, they recommend synthetic opioids as opposed to natural (morphine)  - Upon anesthesia chart review, patient has received fentanyl without issue   - Decision made to administer Tramadol 50mg PO  - Continue to monitor       Deb Edmond MD  OB/GYN PGY-2

## 2024-11-27 NOTE — PROGRESS NOTES
Dr. Edmond states patient to remain on CEFM and toco until 1930. If tracing looks reactive and reassuring, patient ok to come off of monitor and will become NST BID. Next NST on 11/27 at 0800.

## 2024-11-27 NOTE — PLAN OF CARE
Problem: Adult Inpatient Plan of Care  Goal: Plan of Care Review  Outcome: Progressing     Problem: Adult Inpatient Plan of Care  Goal: Patient-Specific Goal (Individualized)  Outcome: Progressing     Problem: Adult Inpatient Plan of Care  Goal: Absence of Hospital-Acquired Illness or Injury  Outcome: Progressing     Problem: Adult Inpatient Plan of Care  Goal: Optimal Comfort and Wellbeing  Outcome: Progressing     Problem: Pain Acute  Goal: Optimal Pain Control and Function  Outcome: Progressing     Problem: Hospitalized  Patient  Goal: Optimal Patient-Fetal Wellbeing  Outcome: Progressing     Problem: Adult Inpatient Plan of Care  Goal: Readiness for Transition of Care  Outcome: Progressing

## 2024-11-27 NOTE — PROGRESS NOTES
Pt called out again reporting abdominal pain 9/10. Per pt tylenol given earlier did not help much. Offered pt heating packs and notified MD of pt complaints. Per Dr Morales she will exam pt again for any changes. Will continue to monitor pt.

## 2024-11-27 NOTE — CARE UPDATE
Resident to bedside as reports contractions are more painful 7/10. Not anything like when first admitted. Reports are about 7 minutes apart. Difficult to discern on [place]. Tylenol did not help. Monitoring reassuring.     SVE: ft/50/-3    Will do Rx of atarax to see if pain subsides after rest.    Sada Daniels MD  Obstetrics and Gynecology, PGY-2

## 2024-11-27 NOTE — CARE UPDATE
Resident to bedside after reported continued cramping. No contractions on monitor. FHTS RR. SVE: ft/50/-3. Will discuss at rounds as patient reporting in pain but low suspicion of PTL.    Sada Daniels MD  Obstetrics and Gynecology, PGY-2

## 2024-11-27 NOTE — DISCHARGE INSTRUCTIONS
Contact us at clinic [phone] or after hours at [phone] if you experience any of the following: contractions every 3-5 minutes for 2 or more hours, leakage of fluid, heavy vaginal discharge, or you are not feeling your baby move.  Continue to do kick counts, 10 kicks within a 2 hour period.  Maintain proper hydration.  Drink 8-10 bottles of water a day.    Contact us if you have a persistent headache that is unrelieved by Tylenol, blurry vision, pain in your abdomen (other than a contraction); persistent nausea and vomiting; or a fever 100.4 or greater    Maintain all follow-up appointments.

## 2024-11-27 NOTE — PLAN OF CARE
Sw met at pt beside to complete assessment. Pt was alert and oriented. Pt denied taking coumadin and dialysis. Pt will be discharged and taken home by Arianna Cardenas.       11/27/24 1241   Discharge Assessment   Assessment Type Discharge Planning Assessment   Confirmed/corrected address, phone number and insurance Yes   Confirmed Demographics Correct on Facesheet   Source of Information patient   People in Home child(liliane), dependent;sibling(s)  (Sister has 2 kids in home)   Do you expect to return to your current living situation? Yes   Do you have help at home or someone to help you manage your care at home? Yes   Who are your caregiver(s) and their phone number(s)? Arianna Cardenas [phone]   Prior to hospitilization cognitive status: Alert/Oriented   Current cognitive status: Alert/Oriented   Walking or Climbing Stairs Difficulty no   Dressing/Bathing Difficulty no   Equipment Currently Used at Home none   Readmission within 30 days? No   Patient currently being followed by outpatient case management? No   Do you currently have service(s) that help you manage your care at home? No   Do you take prescription medications? Yes   Do you have prescription coverage? Yes   Coverage MEDICAID/S COMMUNITY PLAN [facility]   Do you have any problems affording any of your prescribed medications? No   Is the patient taking medications as prescribed? yes   Who is going to help you get home at discharge? Arianna Cardenas [phone]   How do you get to doctors appointments? family or friend will provide   Are you on dialysis? No   Do you take coumadin? No   Discharge Plan A Home   DME Needed Upon Discharge  none   Discharge Plan discussed with: Patient   Transition of Care Barriers None

## 2024-11-27 NOTE — PROGRESS NOTES
[facility], [place] - Antepartum  Obstetrics & Gynecology  Progress Note    Patient Name: Ellen Cardenas  MRN: [id number]  Admission Date: 11/26/2024  Primary Care Provider: Main King MD (Inactive)  Principal Problem: Current pregnancy with history of pre-term labor in third trimester    Subjective:     Interval History: Patient doing well this morning. She reports pain has improved from yesterday but she is still felling painful cramps that will wake her up from sleep. She states they feel similar to period cramps. She is unsure how often she is feeling contractions. She also reports vaginal pressure and states that is the most uncomfortable thing she is experiencing. She denies vaginal bleeding, LOF & endorses good fetal  movement.     Scheduled Meds:   prenatal vitamin  1 tablet Oral Daily    traMADoL  50 mg Oral Once     Continuous Infusions:  PRN Meds:  Current Facility-Administered Medications:     acetaminophen, 650 mg, Oral, Q6H PRN    albuterol, 2 puff, Inhalation, Q4H PRN    diphenhydrAMINE, 25 mg, Oral, Q4H PRN    diphenhydrAMINE, 25 mg, Intravenous, Q4H PRN    ondansetron, 8 mg, Oral, Q8H PRN    senna-docusate 8.6-50 mg, 1 tablet, Oral, Nightly PRN    simethicone, 1 tablet, Oral, Q6H PRN    sodium chloride 0.9%, 10 mL, Intravenous, PRN    Review of patient's allergies indicates:   Allergen Reactions    Latex, natural rubber Itching    Opioids - morphine analogues Hives       Objective:     Vital Signs (Most Recent):  Temp: 98.3 °F (36.8 °C) (11/27/24 0500)  Pulse: 68 (11/27/24 0500)  Resp: 17 (11/27/24 0500)  BP: 108/62 (11/27/24 0500)  SpO2: 98 % (11/27/24 0500) Vital Signs (24h Range):  Temp:  [97.8 °F (36.6 °C)-98.6 °F (37 °C)] 98.3 °F (36.8 °C)  Pulse:  [67-94] 68  Resp:  [16-18] 17  SpO2:  [96 %-100 %] 98 %  BP: ()/(54-73) 108/62     Weight: 74 kg (163 lb 2.3 oz)  Body mass index is 28.9 kg/m².  Patient's last menstrual period was 03/19/2024.    I&O (Last 24H):    Intake/Output Summary (Last  24 hours) at 11/27/2024 0702  Last data filed at 11/26/2024 1002  Gross per 24 hour   Intake 1000 ml   Output --   Net 1000 ml       Physical Exam:   Constitutional: She is oriented to person, place, and time. She appears well-developed and well-nourished. No distress.    HENT:   Head: Normocephalic and atraumatic.    Eyes: EOM are normal.     Cardiovascular:  Normal rate.             Pulmonary/Chest: Effort normal. No respiratory distress.        Abdominal: Soft.             Musculoskeletal: Normal range of motion and moves all extremeties.       Neurological: She is alert and oriented to person, place, and time.    Skin: Skin is warm and dry.    Psychiatric: She has a normal mood and affect. Her behavior is normal.     SVE: ft/50/-3, unchanged from yesterday AM       FHT: 130 bpm, mod BTBV, + accels, - decels, R&R  TOCO: some uterine irritability     Assessment/Plan:     Active Diagnoses:    Diagnosis Date Noted POA    PRINCIPAL PROBLEM:  Current pregnancy with history of pre-term labor in third trimester [O09.213] 11/26/2024 Not Applicable    Positive GBS test [B95.1] 11/21/2024 Yes    Anemia affecting pregnancy in third trimester [O99.013] 10/08/2024 Yes    Single pelvic kidney [Q63.2] 08/27/2024 Not Applicable    Genital herpes simplex virus (HSV) infection in mother affecting pregnancy [O98.319, A60.09] 06/04/2024 Yes    Maternal asthma complicating pregnancy [O99.519, J45.909] 06/04/2024 Yes    Gastroesophageal reflux disease without esophagitis [K21.9] 05/13/2020 Yes    Anxiety [F41.9] 02/11/2020 Yes    Sexually transmitted disease (STD)-GC and chlam treated  03/04/24 outside clinic [A64] 09/26/2016 Yes    Depression [F32.A] 08/04/2015 Yes      Problems Resolved During this Admission:       Pelvic pain  - SVE unchanged overnight   - Patient reports relief from terbutaline & tramadol but endorses continued discomfort   - Requesting additional dose of tramadol for pain relief   - NST R&R  - TOCO with uterine  irritability   - Recommend belly support band  - PT referral sent for pelvic floor therapy   - Patient stable for DC today      2. HSV  - denies any oral or genital lesions ever  - 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     3. GBS positive   - start PCN if cervical change     Deb Edmond MD  Obstetrics & Gynecology  [facility] - Antepartum

## 2024-11-28 VITALS
RESPIRATION RATE: 16 BRPM | HEART RATE: 109 BPM | TEMPERATURE: 98 F | DIASTOLIC BLOOD PRESSURE: 55 MMHG | OXYGEN SATURATION: 97 % | SYSTOLIC BLOOD PRESSURE: 104 MMHG

## 2024-11-28 PROCEDURE — 99284 EMERGENCY DEPT VISIT MOD MDM: CPT | Mod: ,,, | Performed by: OBSTETRICS & GYNECOLOGY

## 2024-11-28 PROCEDURE — 59025 FETAL NON-STRESS TEST: CPT | Mod: 26,,, | Performed by: OBSTETRICS & GYNECOLOGY

## 2024-11-28 PROCEDURE — 59025 FETAL NON-STRESS TEST: CPT

## 2024-11-28 RX ORDER — TRAMADOL HYDROCHLORIDE 50 MG/1
50 TABLET ORAL EVERY 6 HOURS PRN
Qty: 5 TABLET | Refills: 0 | Status: ON HOLD | OUTPATIENT
Start: 2024-11-28 | End: 2024-12-13 | Stop reason: HOSPADM

## 2024-11-28 NOTE — ED PROVIDER NOTES
Encounter Date: 2024       History     Chief Complaint   Patient presents with    Contractions     Pt states contractions started about an hour ago     HPI    Ellen Cardenas is a 24 y.o. [id number] at 36w4d presents complaining of contractions.   This IUP is complicated by h/o HSV and GBS positive status.  Patient reports contractions, denies vaginal bleeding, denies LOF.   Fetal Movement: normal.     Patient was recently discharged this morning after improvement in symptoms from threatened  labor. She states contractions then picked up at 2000 and have been q 2-5 minutes.     Last SVE: /-3    No other symptoms or concerns reported at this time.       Review of patient's allergies indicates:   Allergen Reactions    Latex, natural rubber Itching    Opioids - morphine analogues Hives     Past Medical History:   Diagnosis Date    Asthma     childhood    Constipation     Depression     Ectopic pregnancy      Past Surgical History:   Procedure Laterality Date    EXPLORATORY LAPAROTOMY      ECTOPIC    SALPINGECTOMY Left 2019    Procedure: SALPINGECTOMY;  Surgeon: Felecia Duran MD;  Location: [facility] OR;  Service: OB/GYN;  Laterality: Left;     Family History   Problem Relation Name Age of Onset    Breast cancer Neg Hx      Colon cancer Neg Hx      Ovarian cancer Neg Hx       Social History     Tobacco Use    Smoking status: Never    Smokeless tobacco: Never   Substance Use Topics    Alcohol use: Not Currently     Comment: OCCASIONAL    Drug use: Not Currently     Types: Marijuana     Review of Systems   Constitutional:  Negative for chills and fever.   Respiratory:  Negative for shortness of breath.    Cardiovascular:  Negative for chest pain.   Gastrointestinal:  Positive for abdominal pain (contractions). Negative for nausea and vomiting.   Neurological:  Negative for light-headedness and headaches.   Psychiatric/Behavioral:  Negative for confusion. The patient is not nervous/anxious.         Physical Exam     Initial Vitals   BP Pulse Resp Temp SpO2   -- -- -- -- --      MAP       --         Physical Exam    Vitals reviewed.  Constitutional: She appears well-developed and well-nourished.   HENT:   Head: Normocephalic and atraumatic.   Eyes: Conjunctivae are normal.   Cardiovascular:  Normal rate.           Pulmonary/Chest: No respiratory distress.   Normal work of breathing   Abdominal:   Gravid abdomen     Neurological: She is alert and oriented to person, place, and time.   Skin: Skin is warm and dry.   Psychiatric: She has a normal mood and affect. Her behavior is normal. Thought content normal.     OB LABOR EXAM:       Method: Sterile vaginal exam per MD.       Dilatation: 1.   Station: -3.   Effacement: 70%.       Comments: Unchanged on repeat exam 2 hrs later       ED Course   Obtain Fetal nonstress test (NST)    Date/Time: 11/28/2024 2:01 AM    Performed by: Aj Almonte MD  Authorized by: Camilla Parish MD    Nonstress Test:     Variability:  6-25 BPM    Decelerations:  None    Accelerations:  15 bpm    Baseline:  130    Contractions:  Irregular  Biophysical Profile:     Nonstress Test Interpretation: reactive      Overall Impression:  Reassuring  Post-procedure:     Patient tolerance:  Patient tolerated the procedure well with no immediate complications    Labs Reviewed   POCT URINALYSIS W/O SCOPE          Imaging Results    None          Medications - No data to display  Medical Decision Making  Contractions  - SVE: 1/70/-3, unchanged on two repeated examinations  - Contractions irregular on the tocometer   - Patient is not in active labor   - Patient stable for discharge at this time  - ED return precautions given  - She voiced understanding and is in agreement with the plan      Aj Almonte MD PGY2  Obstetrics and Gynecology        Risk  Prescription drug management.              Attending Attestation:   Physician Attestation Statement for Resident:  As the supervising MD    Physician Attestation Statement: I have personally seen and examined this patient.   I agree with the above history.  -:   As the supervising MD I agree with the above PE.     As the supervising MD I agree with the above treatment, course, plan, and disposition.   -: I agree with the above edited resident note. Pt seen and examined, chart and labs reviewed.    Briefly, 23 yo  at 36w3d presenting with  contractions. Of note, pt was discharged this morning after overnight observation for same concern with no cervical change. Afebrile, VSS. SVE /-3. PO hydration and tramadol 50mg PO given as this had helped with ctx pain while inpatient. Repeat exam 2 hrs later unchanged. NST Cat I reactive. Discussed with pt lack of cervical change inconsistent with  labor. Labor precautions, kick counts reviewed.     All questions answered. Stable for d/c home with outpatient follow up.     Camilla Parish MD  OB Hospitalist  2024     I was personally present during the critical portions of the procedure(s) performed by the resident and was immediately available in the ED to provide services and assistance as needed during the entire procedure.  I have reviewed and agree with the residents interpretation of the following: lab data.  I have reviewed the following: old records at this facility.                                       Clinical Impression:  Final diagnoses:  [O47.9] Uterine contractions during pregnancy (Primary)  [Z3A.36] 36 weeks gestation of pregnancy                 Aj Almonte MD  Resident  24 0203       Camilla Parish MD  24 0247

## 2024-11-29 ENCOUNTER — NURSE TRIAGE (OUTPATIENT)
Dept: ADMINISTRATIVE | Facility: CLINIC | Age: 24
End: 2024-11-29
Payer: MEDICAID

## 2024-11-29 ENCOUNTER — PATIENT MESSAGE (OUTPATIENT)
Dept: OBSTETRICS AND GYNECOLOGY | Facility: CLINIC | Age: 24
End: 2024-11-29
Payer: MEDICAID

## 2024-11-29 NOTE — TELEPHONE ENCOUNTER
Pt calling in, having contractions. Went to ED but was discharged due to cervix closed. Still having contractions. States every 2 minutes. States abdominal pain is constant and severe. Dispo is call 911 now. Pt states she is just going to go to [place] and hung up.   Reason for Disposition   SEVERE constant abdominal pain (e.g., excruciating) and present > 1 hour    Additional Information   Negative: Passed out (e.g., fainted, lost consciousness, blacked out and was not responding)   Negative: Shock suspected (e.g., cold/pale/clammy skin, too weak to stand, low BP, rapid pulse)   Negative: Difficult to awaken or acting confused (e.g., disoriented, slurred speech)    Protocols used: Pregnancy - Labor - [id number]

## 2024-12-03 ENCOUNTER — ROUTINE PRENATAL (OUTPATIENT)
Dept: OBSTETRICS AND GYNECOLOGY | Facility: CLINIC | Age: 24
End: 2024-12-03
Attending: OBSTETRICS & GYNECOLOGY
Payer: MEDICAID

## 2024-12-03 ENCOUNTER — INFUSION (OUTPATIENT)
Dept: INFUSION THERAPY | Facility: OTHER | Age: 24
End: 2024-12-03
Attending: NURSE PRACTITIONER
Payer: MEDICAID

## 2024-12-03 VITALS
TEMPERATURE: 98 F | RESPIRATION RATE: 18 BRPM | SYSTOLIC BLOOD PRESSURE: 109 MMHG | HEART RATE: 82 BPM | DIASTOLIC BLOOD PRESSURE: 55 MMHG | OXYGEN SATURATION: 98 %

## 2024-12-03 VITALS
BODY MASS INDEX: 28.24 KG/M2 | SYSTOLIC BLOOD PRESSURE: 110 MMHG | DIASTOLIC BLOOD PRESSURE: 60 MMHG | WEIGHT: 159.38 LBS

## 2024-12-03 DIAGNOSIS — O09.93 SUPERVISION OF HIGH RISK PREGNANCY IN THIRD TRIMESTER: Primary | ICD-10-CM

## 2024-12-03 DIAGNOSIS — O99.519 MATERNAL ASTHMA COMPLICATING PREGNANCY: ICD-10-CM

## 2024-12-03 DIAGNOSIS — B95.1 POSITIVE GBS TEST: ICD-10-CM

## 2024-12-03 DIAGNOSIS — A60.09 GENITAL HERPES SIMPLEX VIRUS (HSV) INFECTION IN MOTHER AFFECTING PREGNANCY: ICD-10-CM

## 2024-12-03 DIAGNOSIS — O99.013 ANEMIA AFFECTING PREGNANCY IN THIRD TRIMESTER: Primary | ICD-10-CM

## 2024-12-03 DIAGNOSIS — O99.013 ANEMIA AFFECTING PREGNANCY IN THIRD TRIMESTER: ICD-10-CM

## 2024-12-03 DIAGNOSIS — O98.319 GENITAL HERPES SIMPLEX VIRUS (HSV) INFECTION IN MOTHER AFFECTING PREGNANCY: ICD-10-CM

## 2024-12-03 DIAGNOSIS — O99.343 MENTAL DISORDER AFFECTING PREGNANCY IN THIRD TRIMESTER: ICD-10-CM

## 2024-12-03 DIAGNOSIS — J45.909 MATERNAL ASTHMA COMPLICATING PREGNANCY: ICD-10-CM

## 2024-12-03 PROCEDURE — 96374 THER/PROPH/DIAG INJ IV PUSH: CPT

## 2024-12-03 PROCEDURE — 63600175 PHARM REV CODE 636 W HCPCS

## 2024-12-03 PROCEDURE — 99213 OFFICE O/P EST LOW 20 MIN: CPT | Mod: PBBFAC,TH | Performed by: OBSTETRICS & GYNECOLOGY

## 2024-12-03 PROCEDURE — 99999 PR PBB SHADOW E&M-EST. PATIENT-LVL III: CPT | Mod: PBBFAC,,, | Performed by: OBSTETRICS & GYNECOLOGY

## 2024-12-03 RX ORDER — SODIUM CHLORIDE 0.9 % (FLUSH) 0.9 %
10 SYRINGE (ML) INJECTION
Status: DISCONTINUED | OUTPATIENT
Start: 2024-12-03 | End: 2024-12-03 | Stop reason: HOSPADM

## 2024-12-03 RX ORDER — EPINEPHRINE 0.3 MG/.3ML
0.3 INJECTION SUBCUTANEOUS ONCE AS NEEDED
OUTPATIENT
Start: 2024-12-10

## 2024-12-03 RX ORDER — DIPHENHYDRAMINE HYDROCHLORIDE 50 MG/ML
50 INJECTION INTRAMUSCULAR; INTRAVENOUS ONCE AS NEEDED
OUTPATIENT
Start: 2024-12-10

## 2024-12-03 RX ORDER — HEPARIN 100 UNIT/ML
500 SYRINGE INTRAVENOUS
Status: DISCONTINUED | OUTPATIENT
Start: 2024-12-03 | End: 2024-12-03 | Stop reason: HOSPADM

## 2024-12-03 RX ORDER — SODIUM CHLORIDE 0.9 % (FLUSH) 0.9 %
10 SYRINGE (ML) INJECTION
OUTPATIENT
Start: 2024-12-10

## 2024-12-03 RX ORDER — HEPARIN 100 UNIT/ML
500 SYRINGE INTRAVENOUS
OUTPATIENT
Start: 2024-12-10

## 2024-12-03 RX ADMIN — IRON SUCROSE 200 MG: 20 INJECTION, SOLUTION INTRAVENOUS at 11:12

## 2024-12-03 NOTE — PROGRESS NOTES
Complaints today:contractions not as bad today, Good fetal movements reported. No Bleeding or pains    /60   Wt 72.3 kg (159 lb 6.3 oz)   LMP 2024   BMI 28.24 kg/m²     24 y.o., at 37w2d by Estimated Date of Delivery: 24  Patient Active Problem List   Diagnosis    Depression    Sexually transmitted disease (STD)-GC and chlam treated  24 outside clinic    Anxiety    Eczema    Gastroesophageal reflux disease without esophagitis    Generalized anxiety disorder    Herpes simplex viral infection    Refractory migraine with aura    Pregnancy, supervision, high-risk    Mental disorder affecting pregnancy    Maternal asthma complicating pregnancy    Single pelvic kidney    Anemia affecting pregnancy in third trimester    H. pylori infection    Excessive weight gain during pregnancy, antepartum    Positive GBS test    Current pregnancy with history of pre-term labor in third trimester    Alteration in comfort associated with uterine contractions     OB History    Para Term  AB Living   3       2 0   SAB IAB Ectopic Multiple Live Births       2          # Outcome Date GA Lbr Callum/2nd Weight Sex Type Anes PTL Lv   3 Current            2 Ectopic            1 Ectopic                Dating reviewed    Allergies and problem list reviewed and updated    Medical and surgical history reviewed    Prenatal labs reviewed and updated    Physical Exam:  ABD: soft, gravid, nontender,     Assessment:  Ellen was seen today for routine prenatal visit.    Diagnoses and all orders for this visit:    Supervision of high risk pregnancy in third trimester    Positive GBS test    Maternal asthma complicating pregnancy    Mental disorder affecting pregnancy in third trimester    Anemia affecting pregnancy in third trimester    Genital herpes simplex virus (HSV) infection in mother affecting pregnancy    Care and examination of lactating mother  -     BREAST PUMP FOR HOME USE         Plan:   Continue iron  infusions   follow up 1 Weeks, bleeding/pain precautions  kick counts, labor precautions

## 2024-12-03 NOTE — PLAN OF CARE
Venofer infusion administered no reaction. Patient tolerated well. Patient accompanied by self for discharge home. 24 gauge IV removed, catheter intact. No apparent distress noted. Discharge instructions given to patient. Patient understands instructions. Follow up appointment scheduled for 12/10/2024 and [facility].

## 2024-12-04 ENCOUNTER — TELEPHONE (OUTPATIENT)
Dept: OBSTETRICS AND GYNECOLOGY | Facility: CLINIC | Age: 24
End: 2024-12-04
Payer: MEDICAID

## 2024-12-04 ENCOUNTER — HOSPITAL ENCOUNTER (EMERGENCY)
Facility: OTHER | Age: 24
Discharge: HOME | End: 2024-12-04
Attending: OBSTETRICS & GYNECOLOGY
Payer: MEDICAID

## 2024-12-04 VITALS
OXYGEN SATURATION: 99 % | RESPIRATION RATE: 18 BRPM | HEART RATE: 81 BPM | DIASTOLIC BLOOD PRESSURE: 65 MMHG | TEMPERATURE: 98 F | SYSTOLIC BLOOD PRESSURE: 115 MMHG

## 2024-12-04 DIAGNOSIS — Z3A.37 37 WEEKS GESTATION OF PREGNANCY: ICD-10-CM

## 2024-12-04 DIAGNOSIS — O26.893 VAGINAL DISCHARGE DURING PREGNANCY IN THIRD TRIMESTER: ICD-10-CM

## 2024-12-04 DIAGNOSIS — N89.8 VAGINAL DISCHARGE DURING PREGNANCY IN THIRD TRIMESTER: ICD-10-CM

## 2024-12-04 DIAGNOSIS — Z03.71 ENCOUNTER FOR SUSPECTED PREMATURE RUPTURE OF AMNIOTIC MEMBRANES, WITH RUPTURE OF MEMBRANES NOT FOUND: Primary | ICD-10-CM

## 2024-12-04 LAB
FERN TEST: NEGATIVE
PH, BODY FLUID: NORMAL

## 2024-12-04 PROCEDURE — 25000003 PHARM REV CODE 250: Performed by: OBSTETRICS & GYNECOLOGY

## 2024-12-04 PROCEDURE — 99283 EMERGENCY DEPT VISIT LOW MDM: CPT | Mod: ,,, | Performed by: OBSTETRICS & GYNECOLOGY

## 2024-12-04 PROCEDURE — 59025 FETAL NON-STRESS TEST: CPT

## 2024-12-04 PROCEDURE — 99284 EMERGENCY DEPT VISIT MOD MDM: CPT | Mod: 25

## 2024-12-04 PROCEDURE — 59025 FETAL NON-STRESS TEST: CPT | Mod: 26,,, | Performed by: OBSTETRICS & GYNECOLOGY

## 2024-12-04 RX ORDER — ONDANSETRON 4 MG/1
4 TABLET, ORALLY DISINTEGRATING ORAL ONCE
Status: COMPLETED | OUTPATIENT
Start: 2024-12-04 | End: 2024-12-04

## 2024-12-04 RX ADMIN — ONDANSETRON 4 MG: 4 TABLET, ORALLY DISINTEGRATING ORAL at 10:12

## 2024-12-04 NOTE — DISCHARGE INSTRUCTIONS
Call clinic [phone] or L & D after hours at [phone] for vaginal bleeding, leakage of fluids, regular contractions every 5 mins for 2 hours, decreased fetal movements ( 10 kicks in 2 hours), headache not relieved by Tylenol, blurry vision, or temp of 100.4 or greater.  Begin doing fetal kick counts, at least 10 movements in 2 hours starting at 28 weeks gestation.  Keep next clinic appointment

## 2024-12-04 NOTE — TELEPHONE ENCOUNTER
----- Message from Drea sent at 12/4/2024  8:57 AM CST -----  Pt called said she been leaking fluid since 3 a.m. she is 37 wks pregnant not sure what she should do  she wanted to speak with someone in the office she can be reached at [phone]

## 2024-12-04 NOTE — ED PROVIDER NOTES
"Encounter Date: 2024       History     Chief Complaint   Patient presents with    Rupture of Membranes     Ellen Cardenas is a 24 y.o. [id number] at 37w3d presents complaining of possible ROM.    Patient reports she awoke at 3 am and felt as she was leaking, she went to the bathroom to void and reported she continued to leak. Patient went to bed and awoke this morning feeling "wet" and as if she continued to leak. Patient reports contractions that have been occurring on an infrequent basis, is unsure how often they occur but reports they are painful. Patient denies vaginal bleeding, reports decreased fetal movement compared to what she usually feels but denies doing formal kick counts, reports feeling more movement since arrival to [facility].      This IUP is complicated by h/o HSV and GBS positive status..                    Review of patient's allergies indicates:   Allergen Reactions    Latex, natural rubber Itching    Opioids - morphine analogues Hives     Past Medical History:   Diagnosis Date    Asthma     childhood    Constipation     Depression     Ectopic pregnancy      Past Surgical History:   Procedure Laterality Date    EXPLORATORY LAPAROTOMY      ECTOPIC    SALPINGECTOMY Left 2019    Procedure: SALPINGECTOMY;  Surgeon: Felecia Duran MD;  Location[facility];  Service: OB/GYN;  Laterality: Left;     Family History   Problem Relation Name Age of Onset    Breast cancer Neg Hx      Colon cancer Neg Hx      Ovarian cancer Neg Hx       Social History     Tobacco Use    Smoking status: Never    Smokeless tobacco: Never   Substance Use Topics    Alcohol use: Not Currently     Comment: OCCASIONAL    Drug use: Not Currently     Types: Marijuana     Review of Systems   Constitutional:  Negative for chills, fatigue and fever.   Eyes:  Negative for visual disturbance.   Respiratory:  Negative for shortness of breath.    Cardiovascular:  Negative for chest pain and palpitations.   Gastrointestinal:  " Negative for abdominal pain, diarrhea, nausea and vomiting.   Genitourinary:  Positive for pelvic pain. Negative for dyspareunia, dysuria, hematuria, vaginal bleeding and vaginal pain.   Neurological:  Negative for headaches.       Physical Exam     Initial Vitals [12/04/24 0955]   BP Pulse Resp Temp SpO2   115/65 78 18 98.2 °F (36.8 °C) 99 %      MAP       --         Physical Exam    Vitals reviewed.  Constitutional: She appears well-developed and well-nourished.   HENT:   Head: Normocephalic and atraumatic.   Nose: Nose normal.   Eyes: EOM are normal. Pupils are equal, round, and reactive to light.   Neck:   Normal range of motion.  Cardiovascular:  Normal rate and intact distal pulses.           Pulmonary/Chest: No respiratory distress.   Abdominal: Abdomen is soft. There is no abdominal tenderness. There is no rebound.   Musculoskeletal:         General: Normal range of motion.      Cervical back: Normal range of motion.     Neurological: She is alert and oriented to person, place, and time.   Skin: Skin is warm and dry. Capillary refill takes less than 2 seconds.   Psychiatric: She has a normal mood and affect. Her behavior is normal. Thought content normal.     OB LABOR EXAM:   Pre-Term Labor: No.   Membranes ruptured: No.   Method: Sterile vaginal exam per MD and Sterile speculum exam per MD.   Vaginal Bleeding: spotting.     Dilatation: 1.   Station: -3.   Effacement: 50%.       Comments: Negative Nitrazine and Negative Ferning   Membrane plug in place  Spotting of cervix with insertion of speculum exam        ED Course   Obtain Fetal nonstress test (NST)    Date/Time: 12/4/2024 10:24 AM    Performed by: Makayla Stern MD  Authorized by: Makayla Stern MD    Nonstress Test:     Variability:  6-25 BPM    Decelerations:  None    Accelerations:  15 bpm    Baseline:  120    Contractions:  Irregular  Biophysical Profile:     MVP (Maximal Vertical Pocket):  6.5    Nonstress Test Interpretation: reactive       Overall Impression:  Reassuring  Post-procedure:     Patient tolerance:  Patient tolerated the procedure well with no immediate complications    Labs Reviewed   POCT PH OF BODY FLUIDS       Result Value    pH, Body Fluid neg     POCT FERN TEST, AMNIO    Fern Test Negative            Imaging Results    None          Medications   ondansetron disintegrating tablet 4 mg (4 mg Oral Given 24 1008)     Medical Decision Making  Ellen Cardenas is a 24 y.o. [id number] at 37w3d presents complaining of ROM.     1. Leakage of fluid  - VSS, Afebrile   - PE: As stated above, SVE: /50/-3  - NST/[place]: 120 bpm, mod vinay, +accels/-decels, ctx irregular (R&R)  - BSUS: Cephalic, MVP: 6.50 CM  - Labs: negative nitrazine, negative ferning   - Meds: Zofran given for nausea    Patient Stable and Amendable to discharge. Strict ED return precautions given, patient expressed understanding and is in agreement with plan     Makayla Stern MD  [facility]   OBGYN PGY1        Risk  Prescription drug management.              Attending Attestation:   Physician Attestation Statement for Resident:  As the supervising MD   Physician Attestation Statement: I have personally seen and examined this patient.   I agree with the above history.  -:   As the supervising MD I agree with the above PE.     As the supervising MD I agree with the above treatment, course, plan, and disposition.   I was personally present during the critical portions of the procedure(s) performed by the resident and was immediately available in the ED to provide services and assistance as needed during the entire procedure.  I have reviewed and agree with the residents interpretation of the following: lab data.             Attending ED Notes:   I have personally seen and examined the patient. I agree with the resident's note . Questions welcomed and answered to patient satisfaction.      @ 37w3d  presenting for leakage of fluid  NST: reactive  and reassuring  Exam and ultrasound not consistent with leakage of amniotic fluid  Reassurance provided.     Agree with discharge to home, and given the following instructions: Resume normal activities, encouraged to hydrate well (3L or 100oz of fluids daily). Return to the OB ED for acute concerns such as vaginal bleeding, frequent or painful contractions, loss of fluid or decreased fetal movement.     Return to clinic as scheduled.     Anna Talavera MD  12/4/2024 12:07 PM                                 Clinical Impression:  Final diagnoses:  [Z03.71] Encounter for suspected premature rupture of amniotic membranes, with rupture of membranes not found (Primary)  [O26.893, N89.8] Vaginal discharge during pregnancy in third trimester  [Z3A.37] 37 weeks gestation of pregnancy                 Makayla Stern MD  Resident  12/04/24 1036       Anna Grullon MD  12/04/24 1200

## 2024-12-10 ENCOUNTER — ANESTHESIA EVENT (OUTPATIENT)
Dept: OBSTETRICS AND GYNECOLOGY | Facility: OTHER | Age: 24
End: 2024-12-10
Payer: MEDICAID

## 2024-12-10 ENCOUNTER — ANESTHESIA (OUTPATIENT)
Dept: OBSTETRICS AND GYNECOLOGY | Facility: OTHER | Age: 24
End: 2024-12-10
Payer: MEDICAID

## 2024-12-10 ENCOUNTER — ROUTINE PRENATAL (OUTPATIENT)
Dept: OBSTETRICS AND GYNECOLOGY | Facility: CLINIC | Age: 24
End: 2024-12-10
Attending: OBSTETRICS & GYNECOLOGY
Payer: MEDICAID

## 2024-12-10 ENCOUNTER — HOSPITAL ENCOUNTER (INPATIENT)
Facility: OTHER | Age: 24
LOS: 3 days | Discharge: HOME | End: 2024-12-13
Attending: OBSTETRICS & GYNECOLOGY | Admitting: OBSTETRICS & GYNECOLOGY
Payer: MEDICAID

## 2024-12-10 ENCOUNTER — INFUSION (OUTPATIENT)
Dept: INFUSION THERAPY | Facility: OTHER | Age: 24
End: 2024-12-10
Attending: NURSE PRACTITIONER
Payer: MEDICAID

## 2024-12-10 VITALS
SYSTOLIC BLOOD PRESSURE: 120 MMHG | HEART RATE: 80 BPM | OXYGEN SATURATION: 99 % | RESPIRATION RATE: 16 BRPM | DIASTOLIC BLOOD PRESSURE: 88 MMHG

## 2024-12-10 VITALS — WEIGHT: 163.13 LBS | BODY MASS INDEX: 28.9 KG/M2 | DIASTOLIC BLOOD PRESSURE: 88 MMHG | SYSTOLIC BLOOD PRESSURE: 126 MMHG

## 2024-12-10 DIAGNOSIS — B95.1 POSITIVE GBS TEST: ICD-10-CM

## 2024-12-10 DIAGNOSIS — O26.00 EXCESSIVE WEIGHT GAIN DURING PREGNANCY, ANTEPARTUM: ICD-10-CM

## 2024-12-10 DIAGNOSIS — O99.013 ANEMIA AFFECTING PREGNANCY IN THIRD TRIMESTER: ICD-10-CM

## 2024-12-10 DIAGNOSIS — O47.9 UTERINE CONTRACTIONS DURING PREGNANCY: Primary | ICD-10-CM

## 2024-12-10 DIAGNOSIS — O99.343 MENTAL DISORDER AFFECTING PREGNANCY IN THIRD TRIMESTER: ICD-10-CM

## 2024-12-10 DIAGNOSIS — O09.93 SUPERVISION OF HIGH RISK PREGNANCY IN THIRD TRIMESTER: Primary | ICD-10-CM

## 2024-12-10 DIAGNOSIS — O99.013 ANEMIA AFFECTING PREGNANCY IN THIRD TRIMESTER: Primary | ICD-10-CM

## 2024-12-10 DIAGNOSIS — Z3A.38 38 WEEKS GESTATION OF PREGNANCY: ICD-10-CM

## 2024-12-10 DIAGNOSIS — O09.213 CURRENT PREGNANCY WITH HISTORY OF PRE-TERM LABOR IN THIRD TRIMESTER: ICD-10-CM

## 2024-12-10 LAB
ABO + RH BLD: NORMAL
ALBUMIN SERPL BCP-MCNC: 3.1 G/DL (ref 3.5–5.2)
ALP SERPL-CCNC: 113 U/L (ref 40–150)
ALT SERPL W/O P-5'-P-CCNC: 11 U/L (ref 10–44)
ANION GAP SERPL CALC-SCNC: 11 MMOL/L (ref 8–16)
AST SERPL-CCNC: 14 U/L (ref 10–40)
BASOPHILS # BLD AUTO: 0.04 K/UL (ref 0–0.2)
BASOPHILS NFR BLD: 0.2 % (ref 0–1.9)
BILIRUB SERPL-MCNC: 0.3 MG/DL (ref 0.1–1)
BLD GP AB SCN CELLS X3 SERPL QL: NORMAL
BUN SERPL-MCNC: 7 MG/DL (ref 6–20)
CALCIUM SERPL-MCNC: 8.9 MG/DL (ref 8.7–10.5)
CHLORIDE SERPL-SCNC: 108 MMOL/L (ref 95–110)
CO2 SERPL-SCNC: 18 MMOL/L (ref 23–29)
CREAT SERPL-MCNC: 0.7 MG/DL (ref 0.5–1.4)
CREAT UR-MCNC: 157.9 MG/DL (ref 15–325)
DIFFERENTIAL METHOD BLD: ABNORMAL
EOSINOPHIL # BLD AUTO: 0.2 K/UL (ref 0–0.5)
EOSINOPHIL NFR BLD: 0.9 % (ref 0–8)
ERYTHROCYTE [DISTWIDTH] IN BLOOD BY AUTOMATED COUNT: 14.8 % (ref 11.5–14.5)
EST. GFR  (NO RACE VARIABLE): >60 ML/MIN/1.73 M^2
GLUCOSE SERPL-MCNC: 79 MG/DL (ref 70–110)
HCT VFR BLD AUTO: 34.8 % (ref 37–48.5)
HGB BLD-MCNC: 11.3 G/DL (ref 12–16)
IMM GRANULOCYTES # BLD AUTO: 0.1 K/UL (ref 0–0.04)
IMM GRANULOCYTES NFR BLD AUTO: 0.6 % (ref 0–0.5)
LYMPHOCYTES # BLD AUTO: 2.1 K/UL (ref 1–4.8)
LYMPHOCYTES NFR BLD: 11.9 % (ref 18–48)
MCH RBC QN AUTO: 30.3 PG (ref 27–31)
MCHC RBC AUTO-ENTMCNC: 32.5 G/DL (ref 32–36)
MCV RBC AUTO: 93 FL (ref 82–98)
MONOCYTES # BLD AUTO: 1.4 K/UL (ref 0.3–1)
MONOCYTES NFR BLD: 8.1 % (ref 4–15)
NEUTROPHILS # BLD AUTO: 13.6 K/UL (ref 1.8–7.7)
NEUTROPHILS NFR BLD: 78.3 % (ref 38–73)
NRBC BLD-RTO: 0 /100 WBC
PLATELET # BLD AUTO: 220 K/UL (ref 150–450)
PMV BLD AUTO: 11 FL (ref 9.2–12.9)
POTASSIUM SERPL-SCNC: 3.9 MMOL/L (ref 3.5–5.1)
PROT SERPL-MCNC: 6.4 G/DL (ref 6–8.4)
PROT UR-MCNC: 28 MG/DL (ref 0–15)
PROT/CREAT UR: 0.18 MG/G{CREAT} (ref 0–0.2)
RBC # BLD AUTO: 3.73 M/UL (ref 4–5.4)
SODIUM SERPL-SCNC: 137 MMOL/L (ref 136–145)
SPECIMEN OUTDATE: NORMAL
TREPONEMA PALLIDUM IGG+IGM AB [PRESENCE] IN SERUM OR PLASMA BY IMMUNOASSAY: NONREACTIVE
WBC # BLD AUTO: 17.36 K/UL (ref 3.9–12.7)

## 2024-12-10 PROCEDURE — 99999 PR PBB SHADOW E&M-EST. PATIENT-LVL III: CPT | Mod: PBBFAC,,, | Performed by: OBSTETRICS & GYNECOLOGY

## 2024-12-10 PROCEDURE — 63600175 PHARM REV CODE 636 W HCPCS

## 2024-12-10 PROCEDURE — 85025 COMPLETE CBC W/AUTO DIFF WBC: CPT | Performed by: OBSTETRICS & GYNECOLOGY

## 2024-12-10 PROCEDURE — 99285 EMERGENCY DEPT VISIT HI MDM: CPT | Mod: ,,, | Performed by: OBSTETRICS & GYNECOLOGY

## 2024-12-10 PROCEDURE — 25000003 PHARM REV CODE 250

## 2024-12-10 PROCEDURE — 59409 OBSTETRICAL CARE: CPT | Mod: ,,, | Performed by: ANESTHESIOLOGY

## 2024-12-10 PROCEDURE — 27200710 HC EPIDURAL INFUSION PUMP SET: Performed by: ANESTHESIOLOGY

## 2024-12-10 PROCEDURE — 96374 THER/PROPH/DIAG INJ IV PUSH: CPT

## 2024-12-10 PROCEDURE — 86593 SYPHILIS TEST NON-TREP QUANT: CPT | Performed by: OBSTETRICS & GYNECOLOGY

## 2024-12-10 PROCEDURE — 11000001 HC ACUTE MED/SURG PRIVATE ROOM

## 2024-12-10 PROCEDURE — 62326 NJX INTERLAMINAR LMBR/SAC: CPT | Performed by: ANESTHESIOLOGY

## 2024-12-10 PROCEDURE — 82570 ASSAY OF URINE CREATININE: CPT

## 2024-12-10 PROCEDURE — 72100002 HC LABOR CARE, 1ST 8 HOURS

## 2024-12-10 PROCEDURE — 80053 COMPREHEN METABOLIC PANEL: CPT

## 2024-12-10 PROCEDURE — 59025 FETAL NON-STRESS TEST: CPT | Mod: 26,,, | Performed by: OBSTETRICS & GYNECOLOGY

## 2024-12-10 PROCEDURE — C1751 CATH, INF, PER/CENT/MIDLINE: HCPCS | Performed by: ANESTHESIOLOGY

## 2024-12-10 PROCEDURE — 86901 BLOOD TYPING SEROLOGIC RH(D): CPT | Performed by: OBSTETRICS & GYNECOLOGY

## 2024-12-10 PROCEDURE — 51702 INSERT TEMP BLADDER CATH: CPT

## 2024-12-10 PROCEDURE — 99213 OFFICE O/P EST LOW 20 MIN: CPT | Mod: PBBFAC,TH | Performed by: OBSTETRICS & GYNECOLOGY

## 2024-12-10 RX ORDER — FLUOXETINE 10 MG/1
10 CAPSULE ORAL DAILY
Status: DISCONTINUED | OUTPATIENT
Start: 2024-12-11 | End: 2024-12-13 | Stop reason: HOSPADM

## 2024-12-10 RX ORDER — ALBUTEROL SULFATE 90 UG/1
2 INHALANT RESPIRATORY (INHALATION) EVERY 4 HOURS PRN
Status: DISCONTINUED | OUTPATIENT
Start: 2024-12-10 | End: 2024-12-11

## 2024-12-10 RX ORDER — SIMETHICONE 80 MG
1 TABLET,CHEWABLE ORAL 4 TIMES DAILY PRN
Status: DISCONTINUED | OUTPATIENT
Start: 2024-12-10 | End: 2024-12-11

## 2024-12-10 RX ORDER — HEPARIN 100 UNIT/ML
500 SYRINGE INTRAVENOUS
Status: DISCONTINUED | OUTPATIENT
Start: 2024-12-10 | End: 2024-12-10 | Stop reason: HOSPADM

## 2024-12-10 RX ORDER — ONDANSETRON 8 MG/1
8 TABLET, ORALLY DISINTEGRATING ORAL EVERY 8 HOURS PRN
Status: DISCONTINUED | OUTPATIENT
Start: 2024-12-10 | End: 2024-12-11

## 2024-12-10 RX ORDER — OXYTOCIN-SODIUM CHLORIDE 0.9% IV SOLN 30 UNIT/500ML 30-0.9/5 UT/ML-%
95 SOLUTION INTRAVENOUS ONCE AS NEEDED
Status: DISCONTINUED | OUTPATIENT
Start: 2024-12-10 | End: 2024-12-11

## 2024-12-10 RX ORDER — OXYTOCIN-SODIUM CHLORIDE 0.9% IV SOLN 30 UNIT/500ML 30-0.9/5 UT/ML-%
10 SOLUTION INTRAVENOUS ONCE AS NEEDED
Status: DISCONTINUED | OUTPATIENT
Start: 2024-12-10 | End: 2024-12-11

## 2024-12-10 RX ORDER — FENTANYL/BUPIVACAINE/NS/PF 2MCG/ML-.1
PLASTIC BAG, INJECTION (ML) INJECTION
Status: COMPLETED
Start: 2024-12-10 | End: 2024-12-10

## 2024-12-10 RX ORDER — FAMOTIDINE 10 MG/ML
20 INJECTION INTRAVENOUS ONCE
Status: DISCONTINUED | OUTPATIENT
Start: 2024-12-10 | End: 2024-12-11

## 2024-12-10 RX ORDER — ONDANSETRON HYDROCHLORIDE 2 MG/ML
8 INJECTION, SOLUTION INTRAVENOUS ONCE
Status: COMPLETED | OUTPATIENT
Start: 2024-12-10 | End: 2024-12-10

## 2024-12-10 RX ORDER — SODIUM CHLORIDE 0.9 % (FLUSH) 0.9 %
10 SYRINGE (ML) INJECTION
Status: DISCONTINUED | OUTPATIENT
Start: 2024-12-10 | End: 2024-12-10 | Stop reason: HOSPADM

## 2024-12-10 RX ORDER — MISOPROSTOL 200 UG/1
800 TABLET ORAL ONCE AS NEEDED
Status: DISCONTINUED | OUTPATIENT
Start: 2024-12-10 | End: 2024-12-11

## 2024-12-10 RX ORDER — EPINEPHRINE 0.3 MG/.3ML
0.3 INJECTION SUBCUTANEOUS ONCE AS NEEDED
OUTPATIENT
Start: 2024-12-17

## 2024-12-10 RX ORDER — SODIUM CITRATE AND CITRIC ACID MONOHYDRATE 334; 500 MG/5ML; MG/5ML
30 SOLUTION ORAL ONCE
Status: DISCONTINUED | OUTPATIENT
Start: 2024-12-10 | End: 2024-12-11

## 2024-12-10 RX ORDER — SODIUM CHLORIDE 0.9 % (FLUSH) 0.9 %
10 SYRINGE (ML) INJECTION
OUTPATIENT
Start: 2024-12-17

## 2024-12-10 RX ORDER — LIDOCAINE HYDROCHLORIDE AND EPINEPHRINE 15; 5 MG/ML; UG/ML
INJECTION, SOLUTION EPIDURAL
Status: DISCONTINUED | OUTPATIENT
Start: 2024-12-10 | End: 2024-12-11

## 2024-12-10 RX ORDER — SODIUM CHLORIDE 9 MG/ML
INJECTION, SOLUTION INTRAVENOUS
Status: DISCONTINUED | OUTPATIENT
Start: 2024-12-10 | End: 2024-12-11

## 2024-12-10 RX ORDER — DIPHENHYDRAMINE HYDROCHLORIDE 50 MG/ML
50 INJECTION INTRAMUSCULAR; INTRAVENOUS ONCE AS NEEDED
OUTPATIENT
Start: 2024-12-17

## 2024-12-10 RX ORDER — CEFAZOLIN 2 G/1
2 INJECTION, POWDER, FOR SOLUTION INTRAMUSCULAR; INTRAVENOUS ONCE AS NEEDED
Status: DISCONTINUED | OUTPATIENT
Start: 2024-12-10 | End: 2024-12-11

## 2024-12-10 RX ORDER — FENTANYL/BUPIVACAINE/NS/PF 2MCG/ML-.1
PLASTIC BAG, INJECTION (ML) INJECTION
Status: DISCONTINUED | OUTPATIENT
Start: 2024-12-10 | End: 2024-12-11

## 2024-12-10 RX ORDER — LIDOCAINE HYDROCHLORIDE 10 MG/ML
10 INJECTION, SOLUTION INFILTRATION; PERINEURAL ONCE AS NEEDED
Status: DISCONTINUED | OUTPATIENT
Start: 2024-12-10 | End: 2024-12-11

## 2024-12-10 RX ORDER — TRANEXAMIC ACID 10 MG/ML
1000 INJECTION, SOLUTION INTRAVENOUS EVERY 30 MIN PRN
Status: DISCONTINUED | OUTPATIENT
Start: 2024-12-10 | End: 2024-12-11

## 2024-12-10 RX ORDER — CARBOPROST TROMETHAMINE 250 UG/ML
250 INJECTION, SOLUTION INTRAMUSCULAR
Status: DISCONTINUED | OUTPATIENT
Start: 2024-12-10 | End: 2024-12-11

## 2024-12-10 RX ORDER — FENTANYL/BUPIVACAINE/NS/PF 2MCG/ML-.1
PLASTIC BAG, INJECTION (ML) INJECTION CONTINUOUS
Status: DISCONTINUED | OUTPATIENT
Start: 2024-12-10 | End: 2024-12-11

## 2024-12-10 RX ORDER — SODIUM CHLORIDE, SODIUM LACTATE, POTASSIUM CHLORIDE, CALCIUM CHLORIDE 600; 310; 30; 20 MG/100ML; MG/100ML; MG/100ML; MG/100ML
INJECTION, SOLUTION INTRAVENOUS CONTINUOUS
Status: DISCONTINUED | OUTPATIENT
Start: 2024-12-10 | End: 2024-12-11

## 2024-12-10 RX ORDER — PENICILLIN G POTASSIUM 5000000 [IU]/1
INJECTION, POWDER, FOR SOLUTION INTRAMUSCULAR; INTRAVENOUS
Status: COMPLETED
Start: 2024-12-10 | End: 2024-12-10

## 2024-12-10 RX ORDER — OXYTOCIN-SODIUM CHLORIDE 0.9% IV SOLN 30 UNIT/500ML 30-0.9/5 UT/ML-%
95 SOLUTION INTRAVENOUS CONTINUOUS PRN
Status: DISCONTINUED | OUTPATIENT
Start: 2024-12-10 | End: 2024-12-11

## 2024-12-10 RX ORDER — EPINEPHRINE 0.3 MG/.3ML
0.3 INJECTION SUBCUTANEOUS ONCE AS NEEDED
Status: DISCONTINUED | OUTPATIENT
Start: 2024-12-10 | End: 2024-12-10 | Stop reason: HOSPADM

## 2024-12-10 RX ORDER — OXYTOCIN 10 [USP'U]/ML
10 INJECTION, SOLUTION INTRAMUSCULAR; INTRAVENOUS ONCE AS NEEDED
Status: COMPLETED | OUTPATIENT
Start: 2024-12-10 | End: 2024-12-11

## 2024-12-10 RX ORDER — HEPARIN 100 UNIT/ML
500 SYRINGE INTRAVENOUS
OUTPATIENT
Start: 2024-12-17

## 2024-12-10 RX ORDER — DIPHENHYDRAMINE HYDROCHLORIDE 50 MG/ML
50 INJECTION INTRAMUSCULAR; INTRAVENOUS ONCE AS NEEDED
Status: DISCONTINUED | OUTPATIENT
Start: 2024-12-10 | End: 2024-12-10 | Stop reason: HOSPADM

## 2024-12-10 RX ORDER — CALCIUM CARBONATE 200(500)MG
500 TABLET,CHEWABLE ORAL 3 TIMES DAILY PRN
Status: DISCONTINUED | OUTPATIENT
Start: 2024-12-10 | End: 2024-12-11

## 2024-12-10 RX ORDER — DIPHENOXYLATE HYDROCHLORIDE AND ATROPINE SULFATE 2.5; .025 MG/1; MG/1
2 TABLET ORAL EVERY 6 HOURS PRN
Status: DISCONTINUED | OUTPATIENT
Start: 2024-12-10 | End: 2024-12-11

## 2024-12-10 RX ORDER — ACETAMINOPHEN 500 MG
1000 TABLET ORAL ONCE
Status: COMPLETED | OUTPATIENT
Start: 2024-12-10 | End: 2024-12-10

## 2024-12-10 RX ORDER — METHYLERGONOVINE MALEATE 0.2 MG/ML
200 INJECTION INTRAVENOUS ONCE AS NEEDED
Status: DISCONTINUED | OUTPATIENT
Start: 2024-12-10 | End: 2024-12-11

## 2024-12-10 RX ADMIN — DEXTROSE MONOHYDRATE 5 MILLION UNITS: 5 INJECTION INTRAVENOUS at 08:12

## 2024-12-10 RX ADMIN — IRON SUCROSE 200 MG: 20 INJECTION, SOLUTION INTRAVENOUS at 11:12

## 2024-12-10 RX ADMIN — LIDOCAINE HYDROCHLORIDE,EPINEPHRINE BITARTRATE 3 ML: 15; .005 INJECTION, SOLUTION EPIDURAL; INFILTRATION; INTRACAUDAL; PERINEURAL at 08:12

## 2024-12-10 RX ADMIN — FENTANYL CITRATE 5 ML: 50 INJECTION INTRAMUSCULAR; INTRAVENOUS at 08:12

## 2024-12-10 RX ADMIN — ONDANSETRON 8 MG: 2 INJECTION INTRAMUSCULAR; INTRAVENOUS at 08:12

## 2024-12-10 RX ADMIN — ACETAMINOPHEN 1000 MG: 500 TABLET, FILM COATED ORAL at 07:12

## 2024-12-10 RX ADMIN — FENTANYL CITRATE 8 ML/HR: 50 INJECTION INTRAMUSCULAR; INTRAVENOUS at 08:12

## 2024-12-10 RX ADMIN — DEXTROSE MONOHYDRATE 3 MILLION UNITS: 50 INJECTION, SOLUTION INTRAVENOUS at 11:12

## 2024-12-10 NOTE — PLAN OF CARE
Vital Signs Stable, No apparent distress noted; Pt tolerated _Venofer  w/o difficulty.  24g piv removed;No bleeding,swelling or drainage noted to the site.  AVS/Discharge instructions reviewed ;all questions answered ;Pt verbalizes understanding. Next appointments scheduled; ambulatory from clinic in [place]

## 2024-12-10 NOTE — H&P
HISTORY AND PHYSICAL                                                OBSTETRICS          Subjective:       Ellen Cardenas is a 24 y.o.  female who presents for IOL at 39w0d. Denies ctx, VB, LOF. Normal FM.     This IUP is complicated by maternal R pelvic kidney, h/o ex lap 2/2 ectopic pregnancy, GBS+, h/o gonorrhea/chlamydia (neg LINDA), LEIGH , depression/anxiety, migraines w/ aura, asthma, H. Pylori, excessive weight gain in pregnancy.     Review of Systems   Constitutional:  Negative for chills and fever.   Respiratory:  Negative for cough and shortness of breath.    Cardiovascular:  Negative for chest pain.   Gastrointestinal:  Negative for abdominal pain, constipation, diarrhea, nausea and vomiting.   Genitourinary:  Negative for bladder incontinence, pelvic pain, vaginal bleeding, vaginal discharge and vaginal odor.   Integumentary:  Negative for rash.   Psychiatric/Behavioral:  Negative for depression. The patient is not nervous/anxious.        PMHx:   Past Medical History:   Diagnosis Date    Asthma     childhood    Constipation     Depression     Ectopic pregnancy        PSHx:   Past Surgical History:   Procedure Laterality Date    EXPLORATORY LAPAROTOMY      ECTOPIC    SALPINGECTOMY Left 2019    Procedure: SALPINGECTOMY;  Surgeon: Felecia Duran MD;  Location: [facility];  Service: OB/GYN;  Laterality: Left;       All:   Review of patient's allergies indicates:   Allergen Reactions    Latex, natural rubber Itching    Opioids - morphine analogues Hives       Meds: (Not in a hospital admission)      SH:   Social History     Socioeconomic History    Marital status: Single   Tobacco Use    Smoking status: Never    Smokeless tobacco: Never   Substance and Sexual Activity    Alcohol use: Not Currently     Comment: OCCASIONAL    Drug use: Not Currently     Types: Marijuana    Sexual activity: Yes     Partners: Male     Social Drivers of Health     Financial Resource Strain: Medium Risk (6/10/2024)     Overall Financial Resource Strain (CARDIA)     Difficulty of Paying Living Expenses: Somewhat hard   Food Insecurity: Food Insecurity Present (6/10/2024)    Hunger Vital Sign     Worried About Running Out of Food in the Last Year: Sometimes true     Ran Out of Food in the Last Year: Sometimes true   Transportation Needs: Unknown (2023)    PRAPARE - Transportation     Lack of Transportation (Medical): No   Physical Activity: Insufficiently Active (6/10/2024)    Exercise Vital Sign     Days of Exercise per Week: 2 days     Minutes of Exercise per Session: 60 min   Stress: Stress Concern Present (6/10/2024)    [place] [employer] - Occupational Stress Questionnaire     Feeling of Stress : To some extent   Housing Stability: High Risk (2023)    Housing Stability Vital Sign     Unable to Pay for Housing in the Last Year: Yes     Unstable Housing in the Last Year: No       FH:   Family History   Problem Relation Name Age of Onset    Breast cancer Neg Hx      Colon cancer Neg Hx      Ovarian cancer Neg Hx         OBHx:   OB History    Para Term  AB Living   3 0 0 0 2 0   SAB IAB Ectopic Multiple Live Births   0 0 2 0 0      # Outcome Date GA Lbr Callum/2nd Weight Sex Type Anes PTL Lv   3 Current            2 Ectopic            1 Ectopic                Objective:       /88   Wt 74 kg (163 lb 2.3 oz)   LMP 2024   BMI 28.90 kg/m²     Vitals:    12/10/24 1036   BP: 126/88   Weight: 74 kg (163 lb 2.3 oz)       General: NAD, alert, oriented, cooperative  HEENT: NCAT, EOM grossly intact  Lungs: normal WOB  Heart: regular rate  Abdomen: gravid uterus, soft, nontender, no rebound or guarding  Extremities: no edema    FHT:  to be performed on day of admission  [employer]: to be performed on day of admission  Presentation: to be performed on day of admission    Cervix: to be performed on day of admission    EFW by Leopold's: to be performed on day of admission    Lab Review  Blood  Type O POS  GBBS: positive  Rubella: Immune  RPR/FTA: negative in 3T (collected on admit)  HIV: negative  HepB: negative       Assessment:     24 y.o.  for IOL at 39w0d    IOL  - Consents reviewed, signed and to chart  - Admit to Labor and Delivery unit  - Epidural per Anesthesia  - Draw CBC, T&S  - Notify Staff  - IOL method to be determined on day of admission  - Postpartum contraception: pp IUD  - Postpartum hemorrhage risk: low  - Postpartum lovenox: not indicated    GBS+   - plan for intrapartum PCN    h/o gonorrhea/chlamydia   - dx 2024, s/p tx   - neg testing 2024, 2024, 2024 and 2024    Iron deficiency anemia  - IV iron infusions    Maternal R pelvic kidney, h/o ex lap 2/2 ectopic pregnancy   - noted in case of need for surgery     Depression/anxiety  - no home meds  - mood stable  - recommend 2w pp mood check    Migraines w/ aura  - no home meds  - asymptomatic    Asthma  - no home meds  - asymptomatic    H. Pylori  - diagnosed , continues to have n/v, diarrhea  - continue pepcid, PPI    Excessive weight gain in pregnancy  - TWG 47lb   - current BMI 28  - encourage ambulation       Suma Hodge MD   OB/GYN PGY-3

## 2024-12-10 NOTE — PROGRESS NOTES
Pregnancy dating, labs, ultrasound reports, prenatal testing, and problem list; prior records and results; and available outside records were reviewed and updated in EMR.  Pertinent findings were noted below.    Reason for Visit   Routine Prenatal Visit    HPI   24 y.o., at 38w2d by Estimated Date of Delivery: 24    Patient tearful, reports painful contractions, seen in [facility] today for r/o labor, SVE /-3 s/p 2h monitoring. Patient denies VB, LOF. Normal FM. Denies depression, SI/HI. Reports n/v, diarrhea d/t H pylori, and has HA, heartburn. Reports medications have been minimally helpful.     Exam   /88   Wt 74 kg (163 lb 2.3 oz)   LMP 2024   BMI 28.90 kg/m²     TWlb  GENERAL: No acute distress  ABD: Gravid    Assessment and Plan   There are no diagnoses linked to this encounter.    MOD: IOL on 12/15  MOF: breast/pump  MOC: pp IUD  Consents: completed   H&P: completed today     Labor/[facility] precautions given  Follow-up: none, IOL on 12/15      Suma Hodge MD   OB/GYN PGY-3

## 2024-12-10 NOTE — H&P (VIEW-ONLY)
HISTORY AND PHYSICAL                                                OBSTETRICS          Subjective:       Ellen Cardenas is a 24 y.o.  female who presents for IOL at 39w0d. Denies ctx, VB, LOF. Normal FM.     This IUP is complicated by maternal R pelvic kidney, h/o ex lap 2/2 ectopic pregnancy, GBS+, h/o gonorrhea/chlamydia (neg LINDA), LEIGH , depression/anxiety, migraines w/ aura, asthma, H. Pylori, excessive weight gain in pregnancy.     Review of Systems   Constitutional:  Negative for chills and fever.   Respiratory:  Negative for cough and shortness of breath.    Cardiovascular:  Negative for chest pain.   Gastrointestinal:  Negative for abdominal pain, constipation, diarrhea, nausea and vomiting.   Genitourinary:  Negative for bladder incontinence, pelvic pain, vaginal bleeding, vaginal discharge and vaginal odor.   Integumentary:  Negative for rash.   Psychiatric/Behavioral:  Negative for depression. The patient is not nervous/anxious.        PMHx:   Past Medical History:   Diagnosis Date    Asthma     childhood    Constipation     Depression     Ectopic pregnancy        PSHx:   Past Surgical History:   Procedure Laterality Date    EXPLORATORY LAPAROTOMY      ECTOPIC    SALPINGECTOMY Left 2019    Procedure: SALPINGECTOMY;  Surgeon: Felecia Duran MD;  Location: [facility];  Service: OB/GYN;  Laterality: Left;       All:   Review of patient's allergies indicates:   Allergen Reactions    Latex, natural rubber Itching    Opioids - morphine analogues Hives       Meds: (Not in a hospital admission)      SH:   Social History     Socioeconomic History    Marital status: Single   Tobacco Use    Smoking status: Never    Smokeless tobacco: Never   Substance and Sexual Activity    Alcohol use: Not Currently     Comment: OCCASIONAL    Drug use: Not Currently     Types: Marijuana    Sexual activity: Yes     Partners: Male     Social Drivers of Health     Financial Resource Strain: Medium Risk (6/10/2024)     Overall Financial Resource Strain (CARDIA)     Difficulty of Paying Living Expenses: Somewhat hard   Food Insecurity: Food Insecurity Present (6/10/2024)    Hunger Vital Sign     Worried About Running Out of Food in the Last Year: Sometimes true     Ran Out of Food in the Last Year: Sometimes true   Transportation Needs: Unknown (2023)    PRAPARE - Transportation     Lack of Transportation (Medical): No   Physical Activity: Insufficiently Active (6/10/2024)    Exercise Vital Sign     Days of Exercise per Week: 2 days     Minutes of Exercise per Session: 60 min   Stress: Stress Concern Present (6/10/2024)    [place] [employer] - Occupational Stress Questionnaire     Feeling of Stress : To some extent   Housing Stability: High Risk (2023)    Housing Stability Vital Sign     Unable to Pay for Housing in the Last Year: Yes     Unstable Housing in the Last Year: No       FH:   Family History   Problem Relation Name Age of Onset    Breast cancer Neg Hx      Colon cancer Neg Hx      Ovarian cancer Neg Hx         OBHx:   OB History    Para Term  AB Living   3 0 0 0 2 0   SAB IAB Ectopic Multiple Live Births   0 0 2 0 0      # Outcome Date GA Lbr [place]um/2nd Weight Sex Type Anes PTL Lv   3 Current            2 Ectopic            1 Ectopic                Objective:       /88   Wt 74 kg (163 lb 2.3 oz)   LMP 2024   BMI 28.90 kg/m²     Vitals:    12/10/24 1036   BP: 126/88   Weight: 74 kg (163 lb 2.3 oz)       General: NAD, alert, oriented, cooperative  HEENT: NCAT, EOM grossly intact  Lungs: normal WOB  Heart: regular rate  Abdomen: gravid uterus, soft, nontender, no rebound or guarding  Extremities: no edema    FHT:  to be performed on day of admission  [place]: to be performed on day of admission  Presentation: to be performed on day of admission    Cervix: to be performed on day of admission    EFW by Leopold's: to be performed on day of admission    Lab Review  Blood  Type O POS  GBBS: positive  Rubella: Immune  RPR/FTA: negative in 3T (collected on admit)  HIV: negative  HepB: negative       Assessment:     24 y.o.  for IOL at 39w0d    IOL  - Consents reviewed, signed and to chart  - Admit to Labor and Delivery unit  - Epidural per Anesthesia  - Draw CBC, T&S  - Notify Staff  - IOL method to be determined on day of admission  - Postpartum contraception: pp IUD  - Postpartum hemorrhage risk: low  - Postpartum lovenox: not indicated    GBS+   - plan for intrapartum PCN    h/o gonorrhea/chlamydia   - dx 2024, s/p tx   - neg testing 2024, 2024, 2024 and 2024    Iron deficiency anemia  - IV iron infusions    Maternal R pelvic kidney, h/o ex lap 2/2 ectopic pregnancy   - noted in case of need for surgery     Depression/anxiety  - no home meds  - mood stable  - recommend 2w pp mood check    Migraines w/ aura  - no home meds  - asymptomatic    Asthma  - no home meds  - asymptomatic    H. Pylori  - diagnosed , continues to have n/v, diarrhea  - continue pepcid, PPI    Excessive weight gain in pregnancy  - TWG 47lb   - current BMI 28  - encourage ambulation       Suma Hodge MD   OB/GYN PGY-3

## 2024-12-11 LAB
BASOPHILS # BLD AUTO: 0.05 K/UL (ref 0–0.2)
BASOPHILS NFR BLD: 0.3 % (ref 0–1.9)
DIFFERENTIAL METHOD BLD: ABNORMAL
EOSINOPHIL # BLD AUTO: 0.1 K/UL (ref 0–0.5)
EOSINOPHIL NFR BLD: 0.7 % (ref 0–8)
ERYTHROCYTE [DISTWIDTH] IN BLOOD BY AUTOMATED COUNT: 14.6 % (ref 11.5–14.5)
HCT VFR BLD AUTO: 31.4 % (ref 37–48.5)
HGB BLD-MCNC: 10.1 G/DL (ref 12–16)
IMM GRANULOCYTES # BLD AUTO: 0.11 K/UL (ref 0–0.04)
IMM GRANULOCYTES NFR BLD AUTO: 0.6 % (ref 0–0.5)
LYMPHOCYTES # BLD AUTO: 1.5 K/UL (ref 1–4.8)
LYMPHOCYTES NFR BLD: 8.3 % (ref 18–48)
MCH RBC QN AUTO: 30.3 PG (ref 27–31)
MCHC RBC AUTO-ENTMCNC: 32.2 G/DL (ref 32–36)
MCV RBC AUTO: 94 FL (ref 82–98)
MONOCYTES # BLD AUTO: 1.4 K/UL (ref 0.3–1)
MONOCYTES NFR BLD: 8.2 % (ref 4–15)
NEUTROPHILS # BLD AUTO: 14.3 K/UL (ref 1.8–7.7)
NEUTROPHILS NFR BLD: 81.9 % (ref 38–73)
NRBC BLD-RTO: 0 /100 WBC
PLATELET # BLD AUTO: 184 K/UL (ref 150–450)
PMV BLD AUTO: 11.2 FL (ref 9.2–12.9)
RBC # BLD AUTO: 3.33 M/UL (ref 4–5.4)
WBC # BLD AUTO: 17.48 K/UL (ref 3.9–12.7)

## 2024-12-11 PROCEDURE — 72200004 HC VAGINAL DELIVERY LEVEL I

## 2024-12-11 PROCEDURE — 10907ZC DRAINAGE OF AMNIOTIC FLUID, THERAPEUTIC FROM PRODUCTS OF CONCEPTION, VIA NATURAL OR ARTIFICIAL OPENING: ICD-10-PCS | Performed by: OBSTETRICS & GYNECOLOGY

## 2024-12-11 PROCEDURE — 27000181 HC CABLE, IUPC

## 2024-12-11 PROCEDURE — 36415 COLL VENOUS BLD VENIPUNCTURE: CPT

## 2024-12-11 PROCEDURE — 59409 OBSTETRICAL CARE: CPT | Mod: AT,,, | Performed by: OBSTETRICS & GYNECOLOGY

## 2024-12-11 PROCEDURE — 85025 COMPLETE CBC W/AUTO DIFF WBC: CPT

## 2024-12-11 PROCEDURE — 4A1HXCZ MONITORING OF PRODUCTS OF CONCEPTION, CARDIAC RATE, EXTERNAL APPROACH: ICD-10-PCS | Performed by: OBSTETRICS & GYNECOLOGY

## 2024-12-11 PROCEDURE — 25000003 PHARM REV CODE 250

## 2024-12-11 PROCEDURE — 51798 US URINE CAPACITY MEASURE: CPT

## 2024-12-11 PROCEDURE — 72100003 HC LABOR CARE, EA. ADDL. 8 HRS

## 2024-12-11 PROCEDURE — 0UH97HZ INSERTION OF CONTRACEPTIVE DEVICE INTO UTERUS, VIA NATURAL OR ARTIFICIAL OPENING: ICD-10-PCS | Performed by: OBSTETRICS & GYNECOLOGY

## 2024-12-11 PROCEDURE — 0UQMXZZ REPAIR VULVA, EXTERNAL APPROACH: ICD-10-PCS | Performed by: OBSTETRICS & GYNECOLOGY

## 2024-12-11 PROCEDURE — 58300 INSERT INTRAUTERINE DEVICE: CPT | Mod: 59,,, | Performed by: OBSTETRICS & GYNECOLOGY

## 2024-12-11 PROCEDURE — 51701 INSERT BLADDER CATHETER: CPT

## 2024-12-11 PROCEDURE — 11000001 HC ACUTE MED/SURG PRIVATE ROOM

## 2024-12-11 PROCEDURE — 63600175 PHARM REV CODE 636 W HCPCS: Performed by: OBSTETRICS & GYNECOLOGY

## 2024-12-11 PROCEDURE — 63600175 PHARM REV CODE 636 W HCPCS

## 2024-12-11 PROCEDURE — 58300 INSERT INTRAUTERINE DEVICE: CPT

## 2024-12-11 RX ORDER — METHYLERGONOVINE MALEATE 0.2 MG/ML
200 INJECTION INTRAVENOUS ONCE AS NEEDED
Status: DISCONTINUED | OUTPATIENT
Start: 2024-12-11 | End: 2024-12-13 | Stop reason: HOSPADM

## 2024-12-11 RX ORDER — CARBOPROST TROMETHAMINE 250 UG/ML
250 INJECTION, SOLUTION INTRAMUSCULAR
Status: DISCONTINUED | OUTPATIENT
Start: 2024-12-11 | End: 2024-12-13 | Stop reason: HOSPADM

## 2024-12-11 RX ORDER — ALBUTEROL SULFATE 90 UG/1
2 INHALANT RESPIRATORY (INHALATION) EVERY 6 HOURS PRN
Status: DISCONTINUED | OUTPATIENT
Start: 2024-12-11 | End: 2024-12-13 | Stop reason: HOSPADM

## 2024-12-11 RX ORDER — SODIUM CHLORIDE 0.9 % (FLUSH) 0.9 %
10 SYRINGE (ML) INJECTION
Status: DISCONTINUED | OUTPATIENT
Start: 2024-12-11 | End: 2024-12-13 | Stop reason: HOSPADM

## 2024-12-11 RX ORDER — ONDANSETRON HYDROCHLORIDE 2 MG/ML
8 INJECTION, SOLUTION INTRAVENOUS ONCE
Status: COMPLETED | OUTPATIENT
Start: 2024-12-11 | End: 2024-12-11

## 2024-12-11 RX ORDER — TRANEXAMIC ACID 10 MG/ML
1000 INJECTION, SOLUTION INTRAVENOUS EVERY 30 MIN PRN
Status: DISCONTINUED | OUTPATIENT
Start: 2024-12-11 | End: 2024-12-13 | Stop reason: HOSPADM

## 2024-12-11 RX ORDER — SIMETHICONE 80 MG
1 TABLET,CHEWABLE ORAL EVERY 6 HOURS PRN
Status: DISCONTINUED | OUTPATIENT
Start: 2024-12-11 | End: 2024-12-13 | Stop reason: HOSPADM

## 2024-12-11 RX ORDER — TRAMADOL HYDROCHLORIDE 50 MG/1
50 TABLET ORAL EVERY 4 HOURS PRN
Status: DISCONTINUED | OUTPATIENT
Start: 2024-12-11 | End: 2024-12-13 | Stop reason: HOSPADM

## 2024-12-11 RX ORDER — OXYTOCIN-SODIUM CHLORIDE 0.9% IV SOLN 30 UNIT/500ML 30-0.9/5 UT/ML-%
95 SOLUTION INTRAVENOUS ONCE AS NEEDED
Status: DISCONTINUED | OUTPATIENT
Start: 2024-12-11 | End: 2024-12-13 | Stop reason: HOSPADM

## 2024-12-11 RX ORDER — MISOPROSTOL 200 UG/1
800 TABLET ORAL ONCE AS NEEDED
Status: DISCONTINUED | OUTPATIENT
Start: 2024-12-11 | End: 2024-12-13 | Stop reason: HOSPADM

## 2024-12-11 RX ORDER — OXYTOCIN 10 [USP'U]/ML
10 INJECTION, SOLUTION INTRAMUSCULAR; INTRAVENOUS ONCE AS NEEDED
Status: DISCONTINUED | OUTPATIENT
Start: 2024-12-11 | End: 2024-12-13 | Stop reason: HOSPADM

## 2024-12-11 RX ORDER — OXYTOCIN-SODIUM CHLORIDE 0.9% IV SOLN 30 UNIT/500ML 30-0.9/5 UT/ML-%
95 SOLUTION INTRAVENOUS CONTINUOUS PRN
Status: DISCONTINUED | OUTPATIENT
Start: 2024-12-11 | End: 2024-12-13 | Stop reason: HOSPADM

## 2024-12-11 RX ORDER — ACETAMINOPHEN 325 MG/1
650 TABLET ORAL EVERY 6 HOURS SCHEDULED
Status: DISCONTINUED | OUTPATIENT
Start: 2024-12-11 | End: 2024-12-13 | Stop reason: HOSPADM

## 2024-12-11 RX ORDER — DIPHENHYDRAMINE HYDROCHLORIDE 50 MG/ML
25 INJECTION INTRAMUSCULAR; INTRAVENOUS EVERY 4 HOURS PRN
Status: DISCONTINUED | OUTPATIENT
Start: 2024-12-11 | End: 2024-12-13 | Stop reason: HOSPADM

## 2024-12-11 RX ORDER — PRENATAL WITH FERROUS FUM AND FOLIC ACID 3080; 920; 120; 400; 22; 1.84; 3; 20; 10; 1; 12; 200; 27; 25; 2 [IU]/1; [IU]/1; MG/1; [IU]/1; MG/1; MG/1; MG/1; MG/1; MG/1; MG/1; UG/1; MG/1; MG/1; MG/1; MG/1
1 TABLET ORAL DAILY
Status: DISCONTINUED | OUTPATIENT
Start: 2024-12-11 | End: 2024-12-13 | Stop reason: HOSPADM

## 2024-12-11 RX ORDER — TERBUTALINE SULFATE 1 MG/ML
INJECTION SUBCUTANEOUS
Status: DISPENSED
Start: 2024-12-11 | End: 2024-12-11

## 2024-12-11 RX ORDER — TERBUTALINE SULFATE 1 MG/ML
0.25 INJECTION SUBCUTANEOUS ONCE
Status: DISCONTINUED | OUTPATIENT
Start: 2024-12-11 | End: 2024-12-11

## 2024-12-11 RX ORDER — HYDROCORTISONE 25 MG/G
CREAM TOPICAL 3 TIMES DAILY PRN
Status: DISCONTINUED | OUTPATIENT
Start: 2024-12-11 | End: 2024-12-13 | Stop reason: HOSPADM

## 2024-12-11 RX ORDER — DOCUSATE SODIUM 100 MG/1
200 CAPSULE, LIQUID FILLED ORAL 2 TIMES DAILY PRN
Status: DISCONTINUED | OUTPATIENT
Start: 2024-12-11 | End: 2024-12-12

## 2024-12-11 RX ORDER — OXYTOCIN-SODIUM CHLORIDE 0.9% IV SOLN 30 UNIT/500ML 30-0.9/5 UT/ML-%
10 SOLUTION INTRAVENOUS ONCE AS NEEDED
Status: DISCONTINUED | OUTPATIENT
Start: 2024-12-11 | End: 2024-12-13 | Stop reason: HOSPADM

## 2024-12-11 RX ORDER — ONDANSETRON 8 MG/1
8 TABLET, ORALLY DISINTEGRATING ORAL EVERY 8 HOURS PRN
Status: DISCONTINUED | OUTPATIENT
Start: 2024-12-11 | End: 2024-12-13 | Stop reason: HOSPADM

## 2024-12-11 RX ORDER — IBUPROFEN 600 MG/1
600 TABLET ORAL EVERY 6 HOURS
Status: DISCONTINUED | OUTPATIENT
Start: 2024-12-11 | End: 2024-12-13 | Stop reason: HOSPADM

## 2024-12-11 RX ORDER — OXYTOCIN-SODIUM CHLORIDE 0.9% IV SOLN 30 UNIT/500ML 30-0.9/5 UT/ML-%
0-32 SOLUTION INTRAVENOUS CONTINUOUS
Status: DISCONTINUED | OUTPATIENT
Start: 2024-12-11 | End: 2024-12-11

## 2024-12-11 RX ORDER — DIPHENHYDRAMINE HCL 25 MG
25 CAPSULE ORAL EVERY 4 HOURS PRN
Status: DISCONTINUED | OUTPATIENT
Start: 2024-12-11 | End: 2024-12-13 | Stop reason: HOSPADM

## 2024-12-11 RX ORDER — DIPHENOXYLATE HYDROCHLORIDE AND ATROPINE SULFATE 2.5; .025 MG/1; MG/1
2 TABLET ORAL EVERY 6 HOURS PRN
Status: DISCONTINUED | OUTPATIENT
Start: 2024-12-11 | End: 2024-12-13 | Stop reason: HOSPADM

## 2024-12-11 RX ADMIN — OXYTOCIN 10 UNITS: 10 INJECTION, SOLUTION INTRAMUSCULAR; INTRAVENOUS at 03:12

## 2024-12-11 RX ADMIN — DOCUSATE SODIUM 200 MG: 100 CAPSULE, LIQUID FILLED ORAL at 08:12

## 2024-12-11 RX ADMIN — ACETAMINOPHEN 650 MG: 325 TABLET, FILM COATED ORAL at 05:12

## 2024-12-11 RX ADMIN — TRAMADOL HYDROCHLORIDE 50 MG: 50 TABLET, COATED ORAL at 04:12

## 2024-12-11 RX ADMIN — PRENATAL VIT W/ FE FUMARATE-FA TAB 27-0.8 MG 1 TABLET: 27-0.8 TAB at 09:12

## 2024-12-11 RX ADMIN — ACETAMINOPHEN 650 MG: 325 TABLET, FILM COATED ORAL at 12:12

## 2024-12-11 RX ADMIN — IBUPROFEN 600 MG: 600 TABLET, FILM COATED ORAL at 07:12

## 2024-12-11 RX ADMIN — CALCIUM CARBONATE (ANTACID) CHEW TAB 500 MG 500 MG: 500 CHEW TAB at 01:12

## 2024-12-11 RX ADMIN — ONDANSETRON 8 MG: 2 INJECTION INTRAMUSCULAR; INTRAVENOUS at 06:12

## 2024-12-11 RX ADMIN — FLUOXETINE HYDROCHLORIDE 10 MG: 10 CAPSULE ORAL at 09:12

## 2024-12-11 RX ADMIN — ACETAMINOPHEN 650 MG: 325 TABLET, FILM COATED ORAL at 11:12

## 2024-12-11 RX ADMIN — DOCUSATE SODIUM 200 MG: 100 CAPSULE, LIQUID FILLED ORAL at 09:12

## 2024-12-11 RX ADMIN — IBUPROFEN 600 MG: 600 TABLET, FILM COATED ORAL at 11:12

## 2024-12-11 RX ADMIN — IBUPROFEN 600 MG: 600 TABLET, FILM COATED ORAL at 05:12

## 2024-12-11 RX ADMIN — ACETAMINOPHEN 650 MG: 325 TABLET, FILM COATED ORAL at 07:12

## 2024-12-11 RX ADMIN — LEVONORGESTREL 1 INTRA UTERINE DEVICE: 52 INTRAUTERINE DEVICE INTRAUTERINE at 03:12

## 2024-12-11 RX ADMIN — TRAMADOL HYDROCHLORIDE 50 MG: 50 TABLET, COATED ORAL at 09:12

## 2024-12-11 RX ADMIN — IBUPROFEN 600 MG: 600 TABLET, FILM COATED ORAL at 12:12

## 2024-12-11 NOTE — LACTATION NOTE
Pt attempting to latch infant, no success. Infant currently bottle feeding & supplementing w/ formula. Pt requested to begin pumping. Medela pump brought to room & education provided w/ the information listed below. Pt pumping for about ~ 7 min before she stopped d/t cramps. Heat packs brought to room.     Medela Symphony pump, tubing, collections containers and labels brought to bedside.  Discussed proper pump setting of initiation phase.  Instructed on proper usage of pump and to adjust suction according to maximum comfort level.  Verified appropriate flange fit.  Educated on the frequency and duration of pumping in order to promote and maintain a full milk supply.  Hands on pumping technique reviewed.  Encouraged hand expression after pumping.  Instructed on cleaning of breast pump parts.  Written instructions also given.  Pt verbalized understanding and appropriate recall for proper milk handling, collection, labeling, storage and transportation.

## 2024-12-11 NOTE — PLAN OF CARE
Pt will identify early hunger cues and respond.   Pt will breastfeed baby on cue, or about 8 or more in 24 hours.  Pt will observe adequate milk transfer- audible/ visual swallows, enough wet and dirty diaper, satisfaction cues and hydration status.   Pt will give expressed breast milk if latch is not yet fully efficient.    Pt to give supplement of ebm/formula.   Pt will call for further help as needed

## 2024-12-11 NOTE — PLAN OF CARE
VSS. Pain controlled with scheduled oral pain medication. Currently bottle feeding infant w/ formula, breast pump to be set up. Fundus firm and midline with light lochia rubra. Voiding spontaneously with adequate output. No concerns at this time.

## 2024-12-11 NOTE — INTERVAL H&P NOTE
Ellen Cardenas is 24 y.o.  at 38w2d wga presenting for normal labor.     Temp:  [97.7 °F (36.5 °C)-98.4 °F (36.9 °C)] 98.4 °F (36.9 °C)  Pulse:  [76-98] 85  Resp:  [16-18] 16  SpO2:  [96 %-100 %] 96 %  BP: (104-145)/(68-88) 116/68    FHT: 125 bpm, moderate BTBV, +accels, -decels; Cat 1 (reassuring)  [place]: q 2mins  Presentation: cephalic by ultrasound    SVE: 3/90/-2    Normal Labor  - Consents reviewed, signed and to chart  - Admit to Labor and Delivery unit  - Epidural per Anesthesia  - Draw CBC, T&S, Trep  - Notify Staff  - Postpartum contraception: pp IUD  - Postpartum hemorrhage risk: low  - Postpartum lovenox: not indicated     GBS+   - plan for intrapartum PCN     h/o gonorrhea/chlamydia   - dx 2024, s/p tx   - neg testing 2024, 2024, 2024 and 2024     Iron deficiency anemia  - IV iron infusions     Maternal R pelvic kidney, h/o ex lap 2/2 ectopic pregnancy   - noted in case of need for surgery      Depression/anxiety  - no home meds  - mood stable  - recommend 2w pp mood check     Migraines w/ aura  - no home meds  - asymptomatic     Asthma  - no home meds  - asymptomatic     H. Pylori  - diagnosed , continues to have n/v, diarrhea  - continue pepcid, PPI     Excessive weight gain in pregnancy  - TWG 47lb   - current BMI 28  - encourage ambulation    Maryjo Clement MD  Obstetrics & Gynecology, PGY-1

## 2024-12-11 NOTE — ED PROVIDER NOTES
Encounter Date: 12/10/2024       History     Chief Complaint   Patient presents with    Contractions     Ellen Cardenas is a 24 y.o. [id number] at 38w2d presents complaining of contractions.    She reports she feels contractions constantly and are increasing in intensity. She was seen this morning in the [facility] for CTX q7-10 min and found to be 1/50/-3 on SVE.      This IUP is complicated by R pelvic kidney, h/o ex lap 2/2 ectopic pregnancy, h/o gonorrhea/chlamydia (neg LINDA), LEIGH.  Patient reports contractions, denies vaginal bleeding, denies LOF.   Fetal Movement: normal         Review of patient's allergies indicates:   Allergen Reactions    Latex, natural rubber Itching    Opioids - morphine analogues Hives     Past Medical History:   Diagnosis Date    Asthma     childhood    Constipation     Depression     Ectopic pregnancy      Past Surgical History:   Procedure Laterality Date    EXPLORATORY LAPAROTOMY      ECTOPIC    SALPINGECTOMY Left 2019    Procedure: SALPINGECTOMY;  Surgeon: Felecia Duran MD;  Location: [facility] OR;  Service: OB/GYN;  Laterality: Left;     Family History   Problem Relation Name Age of Onset    Breast cancer Neg Hx      Colon cancer Neg Hx      Ovarian cancer Neg Hx       Social History     Tobacco Use    Smoking status: Never    Smokeless tobacco: Never   Substance Use Topics    Alcohol use: Not Currently     Comment: OCCASIONAL    Drug use: Not Currently     Types: Marijuana     Review of Systems   Constitutional:  Negative for chills and fever.   HENT:  Negative for sore throat.    Eyes:  Negative for visual disturbance.   Respiratory:  Negative for shortness of breath.    Cardiovascular:  Negative for chest pain and leg swelling.   Gastrointestinal:  Positive for abdominal pain (contractions). Negative for nausea and vomiting.   Genitourinary:  Negative for dysuria and vaginal bleeding.   Musculoskeletal:  Negative for back pain.   Skin:  Negative for rash.   Neurological:   Negative for weakness and headaches.   Hematological:  Does not bruise/bleed easily.   Psychiatric/Behavioral: Negative.         Physical Exam     Initial Vitals [12/10/24 1901]   BP Pulse Resp Temp SpO2   116/68 85 16 98.4 °F (36.9 °C) 96 %      MAP       --         Physical Exam    Vitals reviewed.  Constitutional: She appears well-developed and well-nourished. She is not diaphoretic. No distress.   HENT:   Head: Normocephalic and atraumatic.   Eyes: EOM are normal.   Cardiovascular:  Normal rate.           Pulmonary/Chest: No respiratory distress.   Abdominal: There is no abdominal tenderness.   Gravid habitus There is no rebound and no guarding.   Musculoskeletal:         General: No tenderness or edema. Normal range of motion.     Neurological: She is alert and oriented to person, place, and time.   Skin: Skin is warm and dry.   Psychiatric: She has a normal mood and affect.     OB LABOR EXAM:     Membranes ruptured: No.   Method: Sterile vaginal exam per MD.   Vaginal Bleeding: none present.     Dilatation: 3.   Station: -2.   Effacement: 90%.   Amniotic Fluid Color: no fluid.           ED Course   Obtain Fetal nonstress test (NST)    Date/Time: 12/10/2024 7:20 PM    Performed by: Maryjo Clement MD  Authorized by: Maryjo Clement MD    Nonstress Test:     Variability:  6-25 BPM    Decelerations:  None    Accelerations:  15 bpm    Acoustic Stimulator: No      Baseline:  125    Contractions:  Not present  Biophysical Profile:     Nonstress Test Interpretation: reactive      Overall Impression:  Reassuring    Labs Reviewed   CBC W/ AUTO DIFFERENTIAL - Abnormal       Result Value    WBC 17.36 (*)     RBC 3.73 (*)     Hemoglobin 11.3 (*)     Hematocrit 34.8 (*)     MCV 93      MCH 30.3      MCHC 32.5      RDW 14.8 (*)     Platelets 220      MPV 11.0      Immature Granulocytes 0.6 (*)     Gran # (ANC) 13.6 (*)     Immature Grans (Abs) 0.10 (*)     Lymph # 2.1      Mono # 1.4 (*)     Eos # 0.2      Baso # 0.04       nRBC 0      Gran % 78.3 (*)     Lymph % 11.9 (*)     Mono % 8.1      Eosinophil % 0.9      Basophil % 0.2      Differential Method Automated            Imaging Results    None          Medications   lactated ringers bolus 1,000 mL (has no administration in time range)   sodium citrate-citric acid 500-334 mg/5 ml solution 30 mL (0 mLs Oral Hold 12/10/24 2100)   famotidine (PF) injection 20 mg (0 mg Intravenous Hold 12/10/24 2100)   fentanyl 2 mcg/mL with BUPivacaine 0.1% in sodium chloride 0.9% Epidural (has no administration in time range)   albuterol inhaler 2 puff (has no administration in time range)   FLUoxetine capsule 10 mg (has no administration in time range)   lactated ringers bolus 1,000 mL (has no administration in time range)   lactated ringers bolus 500 mL (has no administration in time range)   lactated ringers infusion (has no administration in time range)   0.9% NaCl infusion (has no administration in time range)   ondansetron disintegrating tablet 8 mg (has no administration in time range)   calcium carbonate 200 mg calcium (500 mg) chewable tablet 500 mg (has no administration in time range)   simethicone chewable tablet 80 mg (has no administration in time range)   LIDOcaine HCL 10 mg/ml (1%) injection 10 mL (has no administration in time range)   oxytocin 30 units/500 mL (60 milliunits/mL) in 0.9% NaCl IV bolus from bag (has no administration in time range)   oxytocin 30 units/500 mL (60 milliunits/mL) in 0.9% NaCl (non-titrating) (has no administration in time range)   oxytocin injection 10 Units (has no administration in time range)   miSOPROStoL tablet 800 mcg (has no administration in time range)   miSOPROStoL tablet 800 mcg (has no administration in time range)   methylergonovine injection 200 mcg (has no administration in time range)   carboprost injection 250 mcg (has no administration in time range)   tranexamic acid in NaCl,iso-os IVPB 1,000 mg (has no administration in time range)    diphenoxylate-atropine 2.5-0.025 mg per tablet 2 tablet (has no administration in time range)   ceFAZolin 2 g (has no administration in time range)   azithromycin (ZITHROMAX) 500 mg in 0.9% NaCl 250 mL IVPB (admixture device) (has no administration in time range)   penicillin G potassium 3 Million Units in dextrose 5 % 100 mL IVPB (has no administration in time range)   levonorgestreL (Mirena) 52 mg IUD 1 Intra Uterine Device (has no administration in time range)   lactated ringers bolus 1,000 mL (has no administration in time range)   sodium citrate-citric acid 500-334 mg/5 ml solution 30 mL (has no administration in time range)   famotidine (PF) injection 20 mg (has no administration in time range)   acetaminophen tablet 1,000 mg (1,000 mg Oral Given 12/10/24 1917)   ondansetron injection 8 mg (8 mg Intravenous Given 12/10/24 2050)   fentanyl 2mcg/mL with BUPivacaine 0.1% in sdoium chloride 0.9% Epidural 2 mcg/mL- 0.1 % Soln (  Override pull for Anesthesia 12/10/24 2043)   penicillin G potassium 5 million unit injection (0 Million Units  Override Pull 12/10/24 2007)   penicillin G potassium 5 Million Units in D5W 100 mL IVPB (MB+) (5 Million Units Intravenous New Bag 12/10/24 2011)     Medical Decision Making  24 y.o. [id number] at 38w2d presents complaining of contractions.    NST R&R  [place] w/ CTX q 5 min   VSS    Contractions  - NST R&R   - [place] w/ CTX q5 min, very painful  - SVE: 3/90/-2  - Admit to Labor and Delivery unit  - Consents for delivery including vaginal or  section and blood transfusion signed and to chart  - Risks, benefits, alternatives and possible complications have been discussed in detail with the patient.   - Cephalic on BSUS  - Epidural per Anesthesia  - Draw CBC, T&S, Trep  - Notify Staff  - Recheck in 2-4 hrs or PRN  - Start PCN for intrapartum GBS prophylaxis    Post-Partum Hemorrhage risk - low    Amount and/or Complexity of Data Reviewed  Labs: ordered.    Risk  OTC  drugs.  Prescription drug management.              Attending Attestation:   Physician Attestation Statement for Resident:  As the supervising MD   Physician Attestation Statement: I have personally seen and examined this patient.   I agree with the above history.  -:   As the supervising MD I agree with the above PE.     As the supervising MD I agree with the above treatment, course, plan, and disposition.   I was personally present during the critical portions of the procedure(s) performed by the resident and was immediately available in the ED to provide services and assistance as needed during the entire procedure.  I have reviewed and agree with the residents interpretation of the following: lab data.  I have reviewed the following: old records at this facility.            Attending ED Notes:   I saw and examined the patient myself along with the resident. I have personally reviewed pertinent prior records, labs, imaging, and procedures. I have reviewed the documentation and agree with the findings and plan as documented.      at 38w2d presenting with contractions and found to be in labor with cervix 3/90/-2. Cat 1 FHT. Admit to L&D for labor management, PCN for GBS prophylaxis.    Lisbeth Galvez MD FACOG  OB Hospitalist                                 Clinical Impression:  Final diagnoses:  [O47.9] Uterine contractions during pregnancy (Primary)  [Z3A.38] 38 weeks gestation of pregnancy          ED Disposition Condition    Send to L&D Stable                    Maryjo Clement MD  Resident  12/10/24 1925       Maryjo Clement MD  Resident  12/10/24 2018       Lisbeth Galvez MD  12/10/24 2130

## 2024-12-11 NOTE — L&D DELIVERY NOTE
[facility] - Labor & Delivery  Vaginal Delivery   Operative Note    SUMMARY     Normal spontaneous vaginal delivery of live infant after approximately 5 minutes of maternal pushing effort. Infant delivered position OA over intact perineum. Nuchal cord: Yes, cord reduced at perineum. Infant was placed on mothers abdomen for skin to skin and bulb suctioning performed. Delayed cord clamping was performed as infant appeared vigorous. Cord was clamped and cut and cord blood was collected.    Spontaneous delivery of placenta with gentle traction applied. IM pitocin was given noting good uterine tone. No trailing membranes were noted on bimanual examination. Placenta was inspected and noted to be intact.    Bilateral labia lacerations that were repaired due to bleeding with running locked suture of 2-0 Vicryl .    After repair attention was turned to placement of IUD. Mirena IUD was placed.    Exp 2027  Lot [id number]    Patient tolerated delivery well. Sponge, needle, and lap counted correctly x2. .    Sada Daniels MD  Obstetrics and Gynecology, PGY-2    I was present for the procedure and agree with the above note.     Therese Jones      Indications:  (spontaneous vaginal delivery)  Pregnancy complicated by:   Patient Active Problem List   Diagnosis    Depression    Sexually transmitted disease (STD)-GC and chlam treated  24 outside clinic    Anxiety    Eczema    Gastroesophageal reflux disease without esophagitis    Generalized anxiety disorder    Herpes simplex viral infection    Refractory migraine with aura    Pregnancy, supervision, high-risk    Mental disorder affecting pregnancy    Maternal asthma complicating pregnancy    Single pelvic kidney    Anemia affecting pregnancy in third trimester    H. pylori infection    Excessive weight gain during pregnancy, antepartum    Positive GBS test    Current pregnancy with history of pre-term labor in third trimester    Alteration in comfort  "associated with uterine contractions     (spontaneous vaginal delivery)     Admitting GA: 38w3d    Delivery Information for Milo Cardenas    Birth information:  YOB: 2024   Time of birth: 3:08 AM   Sex: male   Head Delivery Date/Time: 2024  3:08 AM   Delivery type: Vaginal, Spontaneous   Gestational Age: 38w3d       Delivery Providers    Delivering clinician: Therese Jones MD   Provider Role    Sada Morales, Haily Sparks, Hortencia Lindquist MD Turner, Teiraney               Measurements    Weight: 2980 g  Weight (lbs): 6 lb 9.1 oz  Length: 50.8 cm  Length (in): 20"  Head circumference: 35.5 cm  Chest circumference: 31.8 cm         Apgars    Living status: Living  Apgar Component Scores:  1 min.:  5 min.:  10 min.:  15 min.:  20 min.:    Skin color:  1  1       Heart rate:  2  2       Reflex irritability:  2  2       Muscle tone:  2  2       Respiratory effort:  2  2       Total:  9  9       Apgars assigned by: ZHANG PEARSON RNC         Operative Delivery    Forceps attempted?: No  Vacuum extractor attempted?: No         Shoulder Dystocia    Shoulder dystocia present?: No           Presentation    Presentation: Vertex  Position: Occiput Anterior           Interventions/Resuscitation    Method: Bulb Suctioning, Tactile Stimulation       Cord    Vessels: 3 vessels  Complications: Nuchal  Nuchal Intervention: reduced  Nuchal Cord Description: loose nuchal cord  Number of Loops: 1  Delayed Cord Clamping?: Yes  Cord Clamped Date/Time: 2024  3:09 AM  Cord Blood Disposition: Sent with Baby  Gases Sent?: No  Stem Cell Collection (by MD): No       Placenta    Placenta delivery date/time: 2024 0310  Placenta removal: Expressed  Placenta appearance: Intact  Placenta disposition: Discarded           Labor Events:       labor: No     Labor Onset Date/Time:         Dilation Complete Date/Time: 2024 02:57     Start Pushing Date/Time: 2024 03:07   "     Start Pushing Date/Time: 2024 03:07     Rupture Date/Time: 24 0025        Rupture type: ARM (Artificial Rupture)        Fluid Amount:       Fluid Color: Clear              steroids:       Antibiotics given for GBS:       Induction:       Indications for induction:        Augmentation: amniotomy     Indications for augmentation:       Labor complications: None     Additional complications:          Cervical ripening:                     Delivery:      Episiotomy: None     Indication for Episiotomy:       Perineal Lacerations:   Repaired:      Periurethral Laceration:   Repaired:     Labial Laceration: bilateral Repaired: Yes   Sulcus Laceration:   Repaired:     Vaginal Laceration:   Repaired:     Cervical Laceration:   Repaired:     Repair suture:       Repair # of packets: 1     Last Value - EBL - Nursing (mL):       Sum - EBL - Nursing (mL): 0     Last Value - EBL - Anesthesia (mL):      Calculated QBL (mL): 425     Running total QBL (mL): 425     Vaginal Sweep Performed: Yes     Surgicount Correct: Yes     Vaginal Packing: No Quantity:       Other providers:       Anesthesia    Method: Epidural          Details (if applicable):  Trial of Labor      Categorization:      Priority:     Indications for :     Incision Type:       Additional  information:  Forceps:    Vacuum:    Breech:    Observed anomalies    Other (Comments):

## 2024-12-11 NOTE — ANESTHESIA PREPROCEDURE EVALUATION
[facility]  Anesthesia Pre-Operative Evaluation         Patient Name: Ellen Cardenas  YOB: 2000  MRN: [id number]    12/10/2024      Ellen Cardenas is a 24 y.o. female  @ 38w2d who presents in labor. IUP c/b h/o ex-lap 2/2 ectopic, maternal R pelvic kidney, asthma, and GERD.    Patient denies cardiac disease, bleeding disorders, or spine pathology.     OB History    Para Term  AB Living   3       2 0   SAB IAB Ectopic Multiple Live Births       2          # Outcome Date GA Lbr Callum/2nd Weight Sex Type Anes PTL Lv   3 Current            2 Ectopic            1 Ectopic                Review of patient's allergies indicates:   Allergen Reactions    Latex, natural rubber Itching    Opioids - morphine analogues Hives       Wt Readings from Last 1 Encounters:   12/10/24 1036 74 kg (163 lb 2.3 oz)       BP Readings from Last 3 Encounters:   12/10/24 110/67   12/10/24 120/88   12/10/24 126/88       Patient Active Problem List   Diagnosis    Depression    Sexually transmitted disease (STD)-GC and chlam treated  24 outside clinic    Anxiety    Eczema    Gastroesophageal reflux disease without esophagitis    Generalized anxiety disorder    Herpes simplex viral infection    Refractory migraine with aura    Pregnancy, supervision, high-risk    Mental disorder affecting pregnancy    Maternal asthma complicating pregnancy    Single pelvic kidney    Anemia affecting pregnancy in third trimester    H. pylori infection    Excessive weight gain during pregnancy, antepartum    Positive GBS test    Current pregnancy with history of pre-term labor in third trimester    Alteration in comfort associated with uterine contractions       Past Surgical History:   Procedure Laterality Date    EXPLORATORY LAPAROTOMY      ECTOPIC    SALPINGECTOMY Left 2019     Procedure: SALPINGECTOMY;  Surgeon: Felecia Duran MD;  Location: [facility];  Service: OB/GYN;  Laterality: Left;       Social History     Socioeconomic History    Marital status: Single   Tobacco Use    Smoking status: Never    Smokeless tobacco: Never   Substance and Sexual Activity    Alcohol use: Not Currently     Comment: OCCASIONAL    Drug use: Not Currently     Types: Marijuana    Sexual activity: Yes     Partners: Male     Social Drivers of Health     Financial Resource Strain: Low Risk  (12/10/2024)    Overall Financial Resource Strain (CARDIA)     Difficulty of Paying Living Expenses: Not very hard   Food Insecurity: Food Insecurity Present (12/10/2024)    Hunger Vital Sign     Worried About Running Out of Food in the Last Year: Sometimes true     Ran Out of Food in the Last Year: Sometimes true   Transportation Needs: Unmet Transportation Needs (12/10/2024)    TRANSPORTATION NEEDS     Transportation : Yes, it has kept me from medical appointments or from getting my medications.   Physical Activity: Insufficiently Active (6/10/2024)    Exercise Vital Sign     Days of Exercise per Week: 2 days     Minutes of Exercise per Session: 60 min   Stress: Stress Concern Present (12/10/2024)    [employer] - Occupational Stress Questionnaire     Feeling of Stress : To some extent   Housing Stability: High Risk (12/10/2024)    Housing Stability Vital Sign     Unable to Pay for Housing in the Last Year: Yes     Homeless in the Last Year: No         Chemistry        Component Value Date/Time     12/10/2024 2059    K 3.9 12/10/2024 2059     12/10/2024 2059    CO2 18 (L) 12/10/2024 2059    BUN 7 12/10/2024 2059    CREATININE 0.7 12/10/2024 2059    GLU 79 12/10/2024 2059        Component Value Date/Time    CALCIUM 8.9 12/10/2024 2059    ALKPHOS 113 12/10/2024 2059    AST 14 12/10/2024 2059    ALT 11 12/10/2024 2059    BILITOT 0.3 12/10/2024 2059    ESTGFRAFRICA >105 09/16/2022 1506  "   ESTGFRAFRICA >60 10/04/2021 0154    EGFRNONAA >60 10/04/2021 0154            Lab Results   Component Value Date    WBC 17.36 (H) 12/10/2024    HGB 11.3 (L) 12/10/2024    HCT 34.8 (L) 12/10/2024    MCV 93 12/10/2024     12/10/2024       No results for input(s): "PT", "INR", "PROTIME", "APTT" in the last 72 hours.            Pre-op Assessment    I have reviewed the Patient Summary Reports.     I have reviewed the Nursing Notes. I have reviewed the NPO Status.   I have reviewed the Medications.     Review of Systems  Anesthesia Hx:  No problems with previous Anesthesia   History of prior surgery of interest to airway management or planning:          Denies Family Hx of Anesthesia complications.    Denies Personal Hx of Anesthesia complications.                    Social:  Non-Smoker, No Alcohol Use       Hematology/Oncology:       -- Denies Anemia:                                  Cardiovascular:     Denies Pacemaker.  Denies Hypertension.   Denies MI.  Denies CAD.    Denies CABG/stent.     Denies CHF.                                   Pulmonary:    Denies COPD. Asthma                    Renal/:   Denies Chronic Renal Disease.                Hepatic/GI:     GERD    Not Taking GLP-1 Agonists            Neurological:    Denies CVA.   Headaches Denies Seizures.                                Endocrine:  Denies Diabetes.         Denies Obesity / BMI > 30, Denies Morbid Obesity / BMI > 40  Psych:  Psychiatric History anxiety depression                Physical Exam  General: Well nourished, Cooperative, Alert and Oriented    Airway:  Mallampati: II   Mouth Opening: Normal  TM Distance: Normal  Tongue: Normal  Neck ROM: Normal ROM    Dental:  Intact    Chest/Lungs:  Normal Respiratory Rate    Heart:  Rate: Normal  Rhythm: Regular Rhythm        Anesthesia Plan  Type of Anesthesia, risks & benefits discussed:    Anesthesia Type: Gen ETT, Epidural, Spinal, CSE  Intra-op Monitoring Plan: Standard ASA Monitors  Post Op " Pain Control Plan: multimodal analgesia, IV/PO Opioids PRN and intrathecal opioid  Induction:  IV and rapid sequence  Airway Plan: Video, Post-Induction  Informed Consent: Informed consent signed with the Patient and all parties understand the risks and agree with anesthesia plan.  All questions answered. Patient consented to blood products? Yes  ASA Score: 3  Day of Surgery Review of History & Physical: H&P Update referred to the surgeon/provider.    Ready For Surgery From Anesthesia Perspective.     .

## 2024-12-11 NOTE — ANESTHESIA PROCEDURE NOTES
Epidural    Patient location during procedure: OB   Reason for block: primary anesthetic   Reason for block: labor analgesia requested by patient and obstetrician  Diagnosis: IUP   Start time: 12/10/2024 8:01 PM  Timeout: 12/10/2024 8:00 PM  End time: 12/10/2024 8:15 PM  Surgery related to: Vaginal Delivery    Staffing  Performing Provider: Rupali Estes MD  Authorizing Provider: Rupali Estes MD    Staffing  Performed by: Rupali Estes MD  Authorized by: Rupali Estes MD        Preanesthetic Checklist  Completed: patient identified, IV checked, site marked, risks and benefits discussed, surgical consent, monitors and equipment checked, pre-op evaluation, timeout performed, anesthesia consent given, hand hygiene performed and patient being monitored  Preparation  Patient position: sitting  Prep: ChloraPrep  Patient monitoring: Pulse Ox  Reason for block: primary anesthetic   Epidural  Skin Anesthetic: lidocaine 1%  Skin Wheal: 3 mL  Administration type: continuous  Approach: midline  Interspace: L3-4    Injection technique: STEVE air  Needle and Epidural Catheter  Needle type: Tuohy   Needle gauge: 17  Needle length: 3.5 inches  Needle insertion depth: 6 cm  Catheter type: [employer]  Catheter size: 19 G  Catheter at skin depth: 10 cm  Insertion Attempts: 2  Test dose: 3 mL of lidocaine 1.5% with Epi 1-to-200,000  Additional Documentation: incremental injection, negative aspiration for heme and CSF, no paresthesia on injection, no signs/symptoms of IV or SA injection, no significant pain on injection and no significant complaints from patient  Needle localization: anatomical landmarks  Medications:  Volume per aspiration: 5 mL  Time between aspirations: 5 minutes   Assessment  Ease of block: easy  Patient's tolerance of the procedure: no complaints (Patient extremely anxious and required coaching. Required positioner and had extreme difficulty holding position throughout procedure.) No inadvertent  dural puncture with Tuohy.  Dural puncture performed with spinal needle.

## 2024-12-11 NOTE — PROGRESS NOTES
LABOR NOTE    S:  Complaints: No.  Epidural working:  yes      O: /65   Pulse 82   Temp 98.1 °F (36.7 °C)   Resp 20   LMP 2024   SpO2 100%   Breastfeeding No       FHT: 110 Cat 2 (reassuring) mod vinay +accels + occasional decels   CTX: q 2-3 minutes  SVE: 5/90/-1 AROM clr       ASSESSMENT:   24 y.o.  at 38w3d, labor     FHT reassuring    There are no hospital problems to display for this patient.    PLAN:  Continue Close Maternal/Fetal Monitoring  Pitocin Augmentation per protocol  Recheck 2 hours or PRN      Yenifer Dunbar MD  PGY 4  Obstetrics and Gynecology

## 2024-12-11 NOTE — LACTATION NOTE
12/11/24 1020   Maternal Assessment   Breast Shape Bilateral:;round   Breast Density Bilateral:;soft   Areola Bilateral:;elastic;surgical scarring  (pt with history of breast reduction surgery)   Nipples everted   Maternal Infant Feeding   Maternal Emotional State assist needed;relaxed   Infant Positioning clutch/football   Signs of Milk Transfer   (baby sleepy/unable to latch)   Latch Assistance yes     Assisted pt with position and latch. Multiple latch attempts without success. Pt with history of breast reduction surgery.pt concern if baby is getting enough.  Discussed reduction surgery.recommended pt to offer breast, supplement with donor breastmilk/formula and pump for breast stimulation.  Questions answered. Support offered.

## 2024-12-12 PROBLEM — N85.8 ALTERATION IN COMFORT ASSOCIATED WITH UTERINE CONTRACTIONS: Status: RESOLVED | Noted: 2024-11-27 | Resolved: 2024-12-12

## 2024-12-12 PROBLEM — O09.213 CURRENT PREGNANCY WITH HISTORY OF PRE-TERM LABOR IN THIRD TRIMESTER: Status: RESOLVED | Noted: 2024-11-26 | Resolved: 2024-12-12

## 2024-12-12 PROBLEM — O13.9 GESTATIONAL HYPERTENSION: Status: ACTIVE | Noted: 2024-12-12

## 2024-12-12 PROBLEM — D62 ACUTE BLOOD LOSS ANEMIA (ABLA): Status: ACTIVE | Noted: 2024-12-12

## 2024-12-12 LAB
BASOPHILS # BLD AUTO: 0.07 K/UL (ref 0–0.2)
BASOPHILS NFR BLD: 0.5 % (ref 0–1.9)
DIFFERENTIAL METHOD BLD: ABNORMAL
EOSINOPHIL # BLD AUTO: 0.4 K/UL (ref 0–0.5)
EOSINOPHIL NFR BLD: 2.4 % (ref 0–8)
ERYTHROCYTE [DISTWIDTH] IN BLOOD BY AUTOMATED COUNT: 14.4 % (ref 11.5–14.5)
HCT VFR BLD AUTO: 31.7 % (ref 37–48.5)
HGB BLD-MCNC: 10.1 G/DL (ref 12–16)
IMM GRANULOCYTES # BLD AUTO: 0.11 K/UL (ref 0–0.04)
IMM GRANULOCYTES NFR BLD AUTO: 0.7 % (ref 0–0.5)
LYMPHOCYTES # BLD AUTO: 2 K/UL (ref 1–4.8)
LYMPHOCYTES NFR BLD: 13 % (ref 18–48)
MCH RBC QN AUTO: 30.1 PG (ref 27–31)
MCHC RBC AUTO-ENTMCNC: 31.9 G/DL (ref 32–36)
MCV RBC AUTO: 95 FL (ref 82–98)
MONOCYTES # BLD AUTO: 1.1 K/UL (ref 0.3–1)
MONOCYTES NFR BLD: 7 % (ref 4–15)
NEUTROPHILS # BLD AUTO: 11.8 K/UL (ref 1.8–7.7)
NEUTROPHILS NFR BLD: 76.4 % (ref 38–73)
NRBC BLD-RTO: 0 /100 WBC
PLATELET # BLD AUTO: 192 K/UL (ref 150–450)
PMV BLD AUTO: 11.3 FL (ref 9.2–12.9)
RBC # BLD AUTO: 3.35 M/UL (ref 4–5.4)
WBC # BLD AUTO: 15.49 K/UL (ref 3.9–12.7)

## 2024-12-12 PROCEDURE — 85025 COMPLETE CBC W/AUTO DIFF WBC: CPT | Performed by: OBSTETRICS & GYNECOLOGY

## 2024-12-12 PROCEDURE — 36415 COLL VENOUS BLD VENIPUNCTURE: CPT | Performed by: OBSTETRICS & GYNECOLOGY

## 2024-12-12 PROCEDURE — 25000003 PHARM REV CODE 250

## 2024-12-12 PROCEDURE — 99232 SBSQ HOSP IP/OBS MODERATE 35: CPT | Mod: UC,,, | Performed by: ADVANCED PRACTICE MIDWIFE

## 2024-12-12 PROCEDURE — 11000001 HC ACUTE MED/SURG PRIVATE ROOM

## 2024-12-12 PROCEDURE — 94761 N-INVAS EAR/PLS OXIMETRY MLT: CPT

## 2024-12-12 RX ORDER — DOCUSATE SODIUM 100 MG/1
200 CAPSULE, LIQUID FILLED ORAL 2 TIMES DAILY
Status: DISCONTINUED | OUTPATIENT
Start: 2024-12-12 | End: 2024-12-13 | Stop reason: HOSPADM

## 2024-12-12 RX ADMIN — DOCUSATE SODIUM 200 MG: 100 CAPSULE, LIQUID FILLED ORAL at 07:12

## 2024-12-12 RX ADMIN — TRAMADOL HYDROCHLORIDE 50 MG: 50 TABLET, COATED ORAL at 05:12

## 2024-12-12 RX ADMIN — ACETAMINOPHEN 650 MG: 325 TABLET, FILM COATED ORAL at 08:12

## 2024-12-12 RX ADMIN — ACETAMINOPHEN 650 MG: 325 TABLET, FILM COATED ORAL at 01:12

## 2024-12-12 RX ADMIN — IBUPROFEN 600 MG: 600 TABLET, FILM COATED ORAL at 11:12

## 2024-12-12 RX ADMIN — IBUPROFEN 600 MG: 600 TABLET, FILM COATED ORAL at 05:12

## 2024-12-12 RX ADMIN — DOCUSATE SODIUM 200 MG: 100 CAPSULE, LIQUID FILLED ORAL at 09:12

## 2024-12-12 RX ADMIN — IBUPROFEN 600 MG: 600 TABLET, FILM COATED ORAL at 04:12

## 2024-12-12 RX ADMIN — FLUOXETINE HYDROCHLORIDE 10 MG: 10 CAPSULE ORAL at 07:12

## 2024-12-12 RX ADMIN — TRAMADOL HYDROCHLORIDE 50 MG: 50 TABLET, COATED ORAL at 08:12

## 2024-12-12 RX ADMIN — ACETAMINOPHEN 650 MG: 325 TABLET, FILM COATED ORAL at 07:12

## 2024-12-12 RX ADMIN — TRAMADOL HYDROCHLORIDE 50 MG: 50 TABLET, COATED ORAL at 01:12

## 2024-12-12 RX ADMIN — PRENATAL VIT W/ FE FUMARATE-FA TAB 27-0.8 MG 1 TABLET: 27-0.8 TAB at 07:12

## 2024-12-12 RX ADMIN — IBUPROFEN 600 MG: 600 TABLET, FILM COATED ORAL at 09:12

## 2024-12-12 NOTE — PLAN OF CARE
VSS .Patient denies HA, dizziness, vision changes, and RUQ pain. Pt ambulating independently and voiding without difficulty. Patient safety maintained, side rails up, bed low and locked position. Pain  controlled with  pain medication. Fundus midline and firm with minimal lochia. Significant other at bedside and attentive to patient's needs; parents responding to infant cues and bonding appropriately. No additional needs at this time.

## 2024-12-12 NOTE — PROGRESS NOTES
[facility]  Vaginal Delivery Progress Note  Obstetrics    SUBJECTIVE:     Ellen Cardenas is a 24 y.o. female PPD #1 status post Spontaneous vaginal delivery at 38w3d in a pregnancy complicated by anemia, excessive weight gain, asthma,pos GBS. Patient is doing well this morning. She denies nausea, vomiting, fever or chills. Patient reports moderate abdominal pain that is well relieved by oral pain medications. Lochia is mild to moderate  and stable. Patient is voiding without difficulty and ambulating with no difficulty. She has passed flatus and has not had a BM. Patient does plan to breast feed. ? for contraception. She desires circumcision.    OBJECTIVE:     Vital Signs Ranges:  Temp:  [98.1 °F (36.7 °C)-98.2 °F (36.8 °C)] 98.2 °F (36.8 °C)  Pulse:  [64-69] 64  Resp:  [17-18] 18  SpO2:  [98 %] 98 %  BP: (107-113)/(58-66) 110/58    I/O (Last 24H):    Intake/Output Summary (Last 24 hours) at 2024 1025  Last data filed at 2024 2245  Gross per 24 hour   Intake --   Output 300 ml   Net -300 ml       Physical Exam:  General:    alert, appears stated age, and cooperative   Lungs:  Normal effort   Heart:  Reg radial pulse   Abdomen:  normal findings: soft, non-tender   Uterine Size:  firm located 1 FB below the umbilicus.   Incision:   N/A   Extremities:   peripheral pulses normal, no pedal edema, no clubbing or cyanosis, pedal edema 1 +     Lab Review:   [unfilled]    ASSESSMENT:     Assessment:  Active Hospital Problems    Diagnosis    * (spontaneous vaginal delivery)    Gestational hypertension    Positive GBS test    Excessive weight gain during pregnancy, antepartum    Anemia affecting pregnancy in third trimester    Single pelvic kidney    Maternal asthma complicating pregnancy    Herpes simplex viral infection    Anxiety    Sexually transmitted disease (STD)-GC and chlam treated  24 outside clinic    Depression      PLAN:     Plan:  1. Postpartum care:   - Patient  doing well. Continue routine management and advances.   - Continue PO pain meds. Pain well controlled.   - Post P: H/h 10.1. 31.7   - Encourage ambulation   - Circumcision yes   - Contraception?   - Lactation breastfeeding    2. Pp Day 1 stable    Disposition: As patient meets milestones, will plan to discharge home tomorrow.

## 2024-12-12 NOTE — LACTATION NOTE
Lactation visited pt assisted with latching the baby. Discussed with pt the potential milk supply  impact as a result of her breast reduction. Baby latched after a few attempts with non-nutritive sucking. Even with breast compression baby was not actively swallowing, After 15 min at the breast pt bottle fed the baby formula and pt pumped for stimulation. Pt on the plan: breastfeeding, supplement and pump for stimulation. Pt verbalized understanding of feeding plan.    12/12/24 1220   Maternal Assessment   Breast Shape Bilateral:;pendulous;round   Breast Density Bilateral:;soft   Areola Bilateral:;elastic;surgical scarring  (pt with hystory of breast reduction)   Nipples everted   Maternal Infant Feeding   Maternal Emotional State assist needed   Infant Positioning clutch/football   Latch Assistance yes   Equipment Type   Breast Pump Type double electric, hospital grade   Breast Pump Flange Type hard   Breast Pump Flange Size   ([id number])   Breast Pumping   Breast Pumping Interventions post-feed pumping encouraged;frequent pumping encouraged   Breast Pumping double electric breast pump utilized

## 2024-12-12 NOTE — ANESTHESIA POSTPROCEDURE EVALUATION
Anesthesia Post Evaluation    Patient: Ellen Cardenas    Procedure(s) Performed: * No procedures listed *    Final Anesthesia Type: epidural      Patient location during evaluation: floor (Mother-Baby Unit)  Patient participation: Yes- Able to Participate  Level of consciousness: awake and alert  Post-procedure vital signs: reviewed and stable  Pain management: adequate  Airway patency: patent  LIZET mitigation strategies: Use of major conduction anesthesia (spinal/epidural) or peripheral nerve block and Multimodal analgesia  PONV status at discharge: No PONV  Anesthetic complications: no      Cardiovascular status: blood pressure returned to baseline and hemodynamically stable  Respiratory status: unassisted, spontaneous ventilation and room air  Hydration status: euvolemic  Follow-up not needed.              Vitals Value Taken Time   /58 12/12/24 0839   Temp 36.8 °C (98.2 °F) 12/12/24 0839   Pulse 64 12/12/24 0839   Resp 20 12/12/24 1350   SpO2 98 % 12/12/24 0839         No case tracking events are documented in the log.      Pain/Starla Score: Pain Rating Prior to Med Admin: 7 (12/12/2024  1:50 PM)  Pain Rating Post Med Admin: 2 (12/12/2024  6:25 AM)

## 2024-12-13 VITALS
RESPIRATION RATE: 18 BRPM | TEMPERATURE: 98 F | OXYGEN SATURATION: 98 % | HEART RATE: 74 BPM | WEIGHT: 155.44 LBS | DIASTOLIC BLOOD PRESSURE: 66 MMHG | BODY MASS INDEX: 27.53 KG/M2 | SYSTOLIC BLOOD PRESSURE: 107 MMHG

## 2024-12-13 PROCEDURE — 25000003 PHARM REV CODE 250

## 2024-12-13 PROCEDURE — 99232 SBSQ HOSP IP/OBS MODERATE 35: CPT | Mod: ,,, | Performed by: ADVANCED PRACTICE MIDWIFE

## 2024-12-13 RX ORDER — ALBUTEROL SULFATE 90 UG/1
2 INHALANT RESPIRATORY (INHALATION) EVERY 6 HOURS PRN
Qty: 18 G | Refills: 0 | Status: SHIPPED | OUTPATIENT
Start: 2024-12-13 | End: 2025-12-13

## 2024-12-13 RX ORDER — IBUPROFEN 600 MG/1
600 TABLET ORAL EVERY 6 HOURS PRN
Qty: 30 TABLET | Refills: 0 | Status: SHIPPED | OUTPATIENT
Start: 2024-12-13

## 2024-12-13 RX ORDER — ACETAMINOPHEN 325 MG/1
650 TABLET ORAL EVERY 6 HOURS PRN
Qty: 30 TABLET | Refills: 0 | Status: SHIPPED | OUTPATIENT
Start: 2024-12-13

## 2024-12-13 RX ORDER — DOCUSATE SODIUM 100 MG/1
200 CAPSULE, LIQUID FILLED ORAL 2 TIMES DAILY
Qty: 30 CAPSULE | Refills: 0 | Status: SHIPPED | OUTPATIENT
Start: 2024-12-13

## 2024-12-13 RX ADMIN — FLUOXETINE HYDROCHLORIDE 10 MG: 10 CAPSULE ORAL at 09:12

## 2024-12-13 RX ADMIN — ACETAMINOPHEN 650 MG: 325 TABLET, FILM COATED ORAL at 11:12

## 2024-12-13 RX ADMIN — IBUPROFEN 600 MG: 600 TABLET, FILM COATED ORAL at 09:12

## 2024-12-13 RX ADMIN — ACETAMINOPHEN 650 MG: 325 TABLET, FILM COATED ORAL at 06:12

## 2024-12-13 RX ADMIN — TRAMADOL HYDROCHLORIDE 50 MG: 50 TABLET, COATED ORAL at 06:12

## 2024-12-13 RX ADMIN — ACETAMINOPHEN 650 MG: 325 TABLET, FILM COATED ORAL at 01:12

## 2024-12-13 RX ADMIN — TRAMADOL HYDROCHLORIDE 50 MG: 50 TABLET, COATED ORAL at 01:12

## 2024-12-13 RX ADMIN — IBUPROFEN 600 MG: 600 TABLET, FILM COATED ORAL at 03:12

## 2024-12-13 RX ADMIN — TRAMADOL HYDROCHLORIDE 50 MG: 50 TABLET, COATED ORAL at 10:12

## 2024-12-13 RX ADMIN — PRENATAL VIT W/ FE FUMARATE-FA TAB 27-0.8 MG 1 TABLET: 27-0.8 TAB at 09:12

## 2024-12-13 RX ADMIN — DOCUSATE SODIUM 200 MG: 100 CAPSULE, LIQUID FILLED ORAL at 09:12

## 2024-12-13 NOTE — DISCHARGE SUMMARY
[facility] ([place] [place] [facility]  Obstetrics  Discharge Summary      Patient Name: Ellen Cardenas  MRN: [id number]  Admission Date: 12/10/2024  Hospital Length of Stay: 3 days  Discharge Date and Time:  2024 10:19 AM  Attending Physician: Concepcion Melo MD   Discharging Provider: Yara Gu CNM   Primary Care Provider: Main King MD (Inactive)    HPI: No notes on file        * No surgery found *     Hospital Course:   24 PPD #2, doing well, normal involution         Final Active Diagnoses:    Diagnosis Date Noted POA    PRINCIPAL PROBLEM:   (spontaneous vaginal delivery) [O80] 2024 Not Applicable    Gestational hypertension [O13.9] 2024 No    Acute blood loss anemia (ABLA) [D62] 2024 No    Positive GBS test [B95.1] 2024 Yes    Excessive weight gain during pregnancy, antepartum [O26.00] 2024 Yes    Anemia affecting pregnancy in third trimester [O99.013] 10/08/2024 Yes    Single pelvic kidney [Q63.2] 2024 Not Applicable    Maternal asthma complicating pregnancy [O99.519, J45.909] 2024 Yes    Herpes simplex viral infection [B00.9] 2021 Yes    Anxiety [F41.9] 2020 Yes    Sexually transmitted disease (STD)-GC and chlam treated  24 outside clinic [A64] 2016 Yes    Depression [F32.A] 2015 Yes      Problems Resolved During this Admission:        Significant Diagnostic Studies: Labs: CBC   Recent Labs   Lab 24  0408   WBC 15.49*   HGB 10.1*   HCT 31.7*            Feeding Method: both breast and bottle    Immunizations       Date Immunization Status Dose Route/Site Given by    24 MMR Incomplete 0.5 mL Subcutaneous/     24 Tdap Incomplete 0.5 mL Intramuscular/             Delivery:    Episiotomy: None   Lacerations:     Repair suture:     Repair # of packets: 1   Blood loss (ml):       Birth information:  YOB: 2024   Time of birth: 3:08 AM   Sex: male   Delivery type:  "Vaginal, Spontaneous   Gestational Age: 38w3d     Measurements    Weight: 2980 g  Weight (lbs): 6 lb 9.1 oz  Length: 50.8 cm  Length (in): 20"  Head circumference: 35.5 cm  Chest circumference: 31.8 cm         Delivery Clinician: Delivery Providers    Delivering clinician: Therese Jones MD   Provider Role    Sada Morales MD King, Bailey, Hortencia Lindquist MD Turner, Teiraney              Additional  information:  Forceps:    Vacuum:    Breech:    Observed anomalies      Living?:     Apgars    Living status: Living  Apgar Component Scores:  1 min.:  5 min.:  10 min.:  15 min.:  20 min.:    Skin color:  1  1       Heart rate:  2  2       Reflex irritability:  2  2       Muscle tone:  2  2       Respiratory effort:  2  2       Total:  9  9       Apgars assigned by: ZHANG PEARSON RN         Placenta: Delivered:       appearance  Pending Diagnostic Studies:       None            Discharged Condition: good    Disposition: Home or Self Care    Follow Up:   Follow-up Information       Concepcion Mleo MD Follow up in 1 week(s).    Specialties: Obstetrics, Obstetrics and Gynecology  Why: blood pressure check  Contact information:  [address] [id number] [phone]               Concepcion Melo MD. Schedule an appointment as soon as possible for a visit in 2 week(s).    Specialties: Obstetrics, Obstetrics and Gynecology  Why: mood check  Contact information:  [address] [address] [phone]               Concepcion Melo MD Follow up in 6 week(s).    Specialties: Obstetrics, Obstetrics and Gynecology  Why: routine postpartum  Contact information:  [address] [id number] [phone]                           Patient Instructions:   No discharge procedures on file.  Medications:  Current Discharge Medication List        START taking these medications    Details   ibuprofen (ADVIL,MOTRIN) 600 MG tablet Take 1 tablet (600 mg " total) by mouth every 6 (six) hours as needed for Pain.  Qty: 30 tablet, Refills: 0           CONTINUE these medications which have CHANGED    Details   acetaminophen (TYLENOL) 325 MG tablet Take 2 tablets (650 mg total) by mouth every 6 (six) hours as needed for Pain.  Qty: 30 tablet, Refills: 0      albuterol (PROVENTIL/VENTOLIN HFA) 90 mcg/actuation inhaler Inhale 2 puffs into the lungs every 6 (six) hours as needed for Wheezing or Shortness of Breath. Rescue  Qty: 18 g, Refills: 0      docusate sodium (COLACE) 100 MG capsule Take 2 capsules (200 mg total) by mouth 2 (two) times daily.  Qty: 30 capsule, Refills: 0           CONTINUE these medications which have NOT CHANGED    Details   ferrous sulfate 325 (65 FE) MG EC tablet Take 1 tablet (325 mg total) by mouth once daily.  Qty: 60 tablet, Refills: 2    Associated Diagnoses: Antepartum anemia      FLUoxetine 10 MG Tab Take 1 tablet (10 mg total) by mouth once daily.  Qty: 30 tablet, Refills: 0    Associated Diagnoses: Mental disorder affecting pregnancy in first trimester      prenatal 26-iron ps-folic-dha (VITAFOL-ONE) 29 mg iron- 1 mg-200 mg Cap Take 1 capsule by mouth once daily.  Qty: 30 capsule, Refills: 12    Comments: Please substitute whichever prenatal is covered by her insurance.  Associated Diagnoses: Supervision of high risk pregnancy in first trimester      valACYclovir (VALTREX) 500 MG tablet Take 1 tablet (500 mg total) by mouth 2 (two) times daily.  Qty: 60 tablet, Refills: 0    Associated Diagnoses: Genital herpes simplex virus (HSV) infection in mother affecting pregnancy           STOP taking these medications       cetirizine (ZYRTEC) 10 MG tablet Comments:   Reason for Stopping:         famotidine (PEPCID) 20 MG tablet Comments:   Reason for Stopping:         fluticasone propionate (FLONASE) 50 mcg/actuation nasal spray Comments:   Reason for Stopping:         omeprazole (PRILOSEC) 20 MG capsule Comments:   Reason for Stopping:          ondansetron (ZOFRAN-ODT) 4 MG TbDL Comments:   Reason for Stopping:         promethazine (PHENERGAN) 25 MG tablet Comments:   Reason for Stopping:         sumatriptan (IMITREX) 50 MG tablet Comments:   Reason for Stopping:         traMADoL (ULTRAM) 50 mg tablet Comments:   Reason for Stopping:             Follow Up/Patient Instructions:   1. Reviewed postpartum recommendations and precautions ~ encouraged to call for fever, severe or increased pain in head, chest, abdomen, perineum or legs; heavy bleeding, foul smelling lochia, signs of depression, or any other concern. Reviewed postpartum precautions r/t high blood pressure ~ encouraged to call for HA, vision changes, epigastric discomfort, or abnormal swelling.  2. Rx provided: see list  3. Contraception: considering options; will f/u at postpartum visit. Encouraged abstinence and pelvic rest for healing until after postpartum check-up.   4. Encouraged to continue PNV while breastfeeding or at least for 6 weeks postpartum. Rx Fe.  5. Car seat law will be reviewed with patient at discharge per protocol  6. RTO in 4-6 weeks or sooner prn.  7. Discharge home today with written and verbal postpartum instructions and precautions.     Yara Gu CNM  Obstetrics  [facility] ([place])

## 2024-12-13 NOTE — PROGRESS NOTES
[facility] ([place])  Obstetrics  Postpartum Progress Note    Patient Name: Ellen Cardenas  MRN: [id number]  Admission Date: 12/10/2024  Hospital Length of Stay: 3 days  Attending Physician: Concepcion Melo MD  Primary Care Provider: Main King MD (Inactive)    Subjective:     Principal Problem: (spontaneous vaginal delivery)    Hospital Course:  24 PPD #2, doing well, normal involution    Interval History: Doing well, ambulating, voiding, and tolerating regular diet  Lochia: steadily decreasing  Pain: well controlled  requiring PO NSAID medication  Breasts/nipples: intact feeding well without difficulty; has consulted lactation  Depression/anxiety: denies   Support at home: good  Contraception: considering; understands that progesterone only options are appropriate with breastfeeding  [place]: baby is doing well, will f/u with pediatrician     Gen: A&O x 4, NAD  CV: normal HR  Lungs: normal resp effort  Breasts: bilaterally soft, non-tender, nipples intact   Abdomen: soft, non-tender, uterus firm at U - 2 fb  Perineum: approximated, no edema   Lochia: minimal rubra  Ext: bilaterally no pedal edema, without signs of DVT      Objective:     Vital Signs (Most Recent):  Temp: 98.2 °F (36.8 °C) (24 0800)  Pulse: 74 (24 0800)  Resp: 17 (24 0800)  BP: 107/66 (24 0800)  SpO2: 98 % (24 0800) Vital Signs (24h Range):  Temp:  [97.9 °F (36.6 °C)-98.7 °F (37.1 °C)] 98.2 °F (36.8 °C)  Pulse:  [69-76] 74  Resp:  [16-20] 17  SpO2:  [98 %] 98 %  BP: (107-112)/(66-73) 107/66     Weight: 70.5 kg (155 lb 6.8 oz)  Body mass index is 27.53 kg/m².    No intake or output data in the 24 hours ending 24 1010      Significant Labs:  Lab Results   Component Value Date    GROUPTRH O POS 12/10/2024    HEPBSAG Non-reactive 10/08/2024     Recent Labs   Lab 24  0408   HGB 10.1*   HCT 31.7*       I have personallly reviewed all pertinent lab results from the last 24  hours.    Physical Exam:   Constitutional: She is oriented to person, place, and time. She appears well-developed and well-nourished. No distress.    HENT:   Head: Normocephalic and atraumatic.    Eyes: Pupils are equal, round, and reactive to light. EOM are normal.     Cardiovascular:  Normal rate, regular rhythm, normal heart sounds and intact distal pulses.             Pulmonary/Chest: Effort normal and breath sounds normal.        Abdominal: Soft. Bowel sounds are normal. Distension: appropriate involution.             Musculoskeletal: Normal range of motion.       Neurological: She is alert and oriented to person, place, and time.    Skin: Skin is warm and dry.    Psychiatric: She has a normal mood and affect. Her behavior is normal.       Review of Systems   Constitutional: Negative.    Eyes: Negative.    Respiratory: Negative.     Cardiovascular: Negative.    Gastrointestinal:  Abdominal pain: cramping with breastfeeding.   Endocrine: Negative.    Genitourinary:  Vaginal bleeding: normal lochia.   Musculoskeletal: Negative.    Integumentary:  Nipple discharge: lactating. Negative.   Neurological: Negative.    Hematological: Negative.    Psychiatric/Behavioral: Negative.     Breast: Nipple discharge: lactating.    Assessment/Plan:     24 y.o. female  for:    *  (spontaneous vaginal delivery)  Routine postpartum care and advances    Gestational hypertension  BP check postpartum    Depression  For mood check PP        Disposition: As patient meets milestones, will plan to discharge home today.    Yara Gu CNM  Obstetrics  [facility] ([place])

## 2024-12-13 NOTE — SUBJECTIVE & OBJECTIVE
Interval History: Doing well, ambulating, voiding, and tolerating regular diet  Lochia: steadily decreasing  Pain: well controlled  requiring PO NSAID medication  Breasts/nipples: intact feeding well without difficulty; has consulted lactation  Depression/anxiety: denies   Support at home: good  Contraception: considering; understands that progesterone only options are appropriate with breastfeeding  [place]: baby is doing well, will f/u with pediatrician     Gen: A&O x 4, NAD  CV: normal HR  Lungs: normal resp effort  Breasts: bilaterally soft, non-tender, nipples intact   Abdomen: soft, non-tender, uterus firm at U - 2 fb  Perineum: approximated, no edema   Lochia: minimal rubra  Ext: bilaterally no pedal edema, without signs of DVT      Objective:     Vital Signs (Most Recent):  Temp: 98.2 °F (36.8 °C) (24 0800)  Pulse: 74 (24 0800)  Resp: 17 (24 0800)  BP: 107/66 (24 0800)  SpO2: 98 % (24 0800) Vital Signs (24h Range):  Temp:  [97.9 °F (36.6 °C)-98.7 °F (37.1 °C)] 98.2 °F (36.8 °C)  Pulse:  [69-76] 74  Resp:  [16-20] 17  SpO2:  [98 %] 98 %  BP: (107-112)/(66-73) 107/66     Weight: 70.5 kg (155 lb 6.8 oz)  Body mass index is 27.53 kg/m².    No intake or output data in the 24 hours ending 24 1010      Significant Labs:  Lab Results   Component Value Date    GROUPTRH O POS 12/10/2024    HEPBSAG Non-reactive 10/08/2024     Recent Labs   Lab 24  0408   HGB 10.1*   HCT 31.7*       I have personallly reviewed all pertinent lab results from the last 24 hours.    Physical Exam:   Constitutional: She is oriented to person, place, and time. She appears well-developed and well-nourished. No distress.    HENT:   Head: Normocephalic and atraumatic.    Eyes: Pupils are equal, round, and reactive to light. EOM are normal.     Cardiovascular:  Normal rate, regular rhythm, normal heart sounds and intact distal pulses.             Pulmonary/Chest: Effort normal and breath sounds normal.         Abdominal: Soft. Bowel sounds are normal. Distension: appropriate involution.             Musculoskeletal: Normal range of motion.       Neurological: She is alert and oriented to person, place, and time.    Skin: Skin is warm and dry.    Psychiatric: She has a normal mood and affect. Her behavior is normal.       Review of Systems   Constitutional: Negative.    Eyes: Negative.    Respiratory: Negative.     Cardiovascular: Negative.    Gastrointestinal:  Abdominal pain: cramping with breastfeeding.   Endocrine: Negative.    Genitourinary:  Vaginal bleeding: normal lochia.   Musculoskeletal: Negative.    Integumentary:  Nipple discharge: lactating. Negative.   Neurological: Negative.    Hematological: Negative.    Psychiatric/Behavioral: Negative.     Breast: Nipple discharge: lactating.

## 2024-12-13 NOTE — PROGRESS NOTES
The patient location is: home  The chief complaint leading to consultation is: mood check  Visit type: Virtual visit with synchronous audio and video  Total time spent with patient: 11 min    Each patient to whom he or she provides medical services by telemedicine is:  (1) informed of the relationship between the physician and patient and the respective role of any other health care provider with respect to management of the patient; and (2) notified that he or she may decline to receive medical services by telemedicine and may withdraw from such care at any time.    Past medical, surgical, social, family, and obstetric histories; medications; prior records and results; and available outside records were reviewed and updated in the EMR.  Pertinent findings were noted below.    Reason for Visit   No chief complaint on file.    HPI   24 y.o. female     Patient's last menstrual period was 2024.    Mood is good, baby is sleeping and not crying much  Baby latched in hospital but once started supplementing w/ formula the baby prefers the bottle  Trying to pump but not getting much because not doing it regularly enough    Contraception: Mirena - placed immediately post partum      Assessment and Plan   At risk for depressed mood during postpartum period      No signs or sxs of PP blues or depression, has good support at home  Counseled patient that breasts need to be emptied every 3-4 hours to maintain a supply. If doesn't feel that the work is worth it, then discontinue by using ice packs and tight fitting bra and formula feed exclusively.     RTC in 5 weeks for PP visit and to evaluate IUD strings

## 2024-12-13 NOTE — PLAN OF CARE
Breastfeeding discharge education reviewed via breastfeeding guide. Pt is currently breastfeeding, supplementing with formula and EBM and and using the breast pump to protect her milk supply. Pt is currently independent;y executing the breastfeeding plan. Pt to continue the  breastfeeding plan until directed by her baby's pediatrician.Pt encouraged to schedule and outpatient appt to assess breastfeeding and milk transfer.  Pt given breastfeeding resources to contact after discharge for breastfeeding support.

## 2024-12-16 ENCOUNTER — PATIENT MESSAGE (OUTPATIENT)
Dept: OBSTETRICS AND GYNECOLOGY | Facility: OTHER | Age: 24
End: 2024-12-16
Payer: MEDICAID

## 2024-12-18 ENCOUNTER — TELEPHONE (OUTPATIENT)
Dept: OBSTETRICS AND GYNECOLOGY | Facility: CLINIC | Age: 24
End: 2024-12-18
Payer: MEDICAID

## 2024-12-18 NOTE — TELEPHONE ENCOUNTER
Canceled E-Visit and phoned pt. Discussed pain at labial lacerations site. Advised she is doing all the correct thing with Tylenol, ibuprofen, dermoplast spray and ice packs. Advised sensitive area and she could try sitz baths. Advised if no pain relief to go to the [facility] for eval.

## 2024-12-20 ENCOUNTER — OFFICE VISIT (OUTPATIENT)
Dept: OBSTETRICS AND GYNECOLOGY | Facility: CLINIC | Age: 24
End: 2024-12-20
Attending: OBSTETRICS & GYNECOLOGY
Payer: MEDICAID

## 2024-12-20 DIAGNOSIS — Z91.89 AT RISK FOR DEPRESSED MOOD DURING POSTPARTUM PERIOD: Primary | ICD-10-CM

## 2025-01-03 ENCOUNTER — HOSPITAL ENCOUNTER (EMERGENCY)
Facility: HOSPITAL | Age: 25
Discharge: HOME | End: 2025-01-03
Attending: INTERNAL MEDICINE
Payer: MEDICAID

## 2025-01-03 VITALS
WEIGHT: 145.06 LBS | HEIGHT: 63 IN | OXYGEN SATURATION: 98 % | DIASTOLIC BLOOD PRESSURE: 66 MMHG | RESPIRATION RATE: 18 BRPM | SYSTOLIC BLOOD PRESSURE: 96 MMHG | BODY MASS INDEX: 25.7 KG/M2 | HEART RATE: 81 BPM | TEMPERATURE: 98 F

## 2025-01-03 DIAGNOSIS — M77.8 TENDONITIS OF BOTH WRISTS: Primary | ICD-10-CM

## 2025-01-03 LAB
B-HCG UR QL: NEGATIVE
CTP QC/QA: YES

## 2025-01-03 PROCEDURE — 29125 APPL SHORT ARM SPLINT STATIC: CPT | Mod: 50,ER

## 2025-01-03 PROCEDURE — 96372 THER/PROPH/DIAG INJ SC/IM: CPT | Performed by: PHYSICIAN ASSISTANT

## 2025-01-03 PROCEDURE — 63600175 PHARM REV CODE 636 W HCPCS: Mod: ER | Performed by: PHYSICIAN ASSISTANT

## 2025-01-03 PROCEDURE — 81025 URINE PREGNANCY TEST: CPT | Mod: ER | Performed by: INTERNAL MEDICINE

## 2025-01-03 PROCEDURE — 99284 EMERGENCY DEPT VISIT MOD MDM: CPT | Mod: 25,ER

## 2025-01-03 PROCEDURE — 25000003 PHARM REV CODE 250: Mod: ER | Performed by: PHYSICIAN ASSISTANT

## 2025-01-03 RX ORDER — KETOROLAC TROMETHAMINE 30 MG/ML
30 INJECTION, SOLUTION INTRAMUSCULAR; INTRAVENOUS
Status: COMPLETED | OUTPATIENT
Start: 2025-01-03 | End: 2025-01-03

## 2025-01-03 RX ORDER — DEXAMETHASONE SODIUM PHOSPHATE 4 MG/ML
6 INJECTION, SOLUTION INTRA-ARTICULAR; INTRALESIONAL; INTRAMUSCULAR; INTRAVENOUS; SOFT TISSUE
Status: COMPLETED | OUTPATIENT
Start: 2025-01-03 | End: 2025-01-03

## 2025-01-03 RX ORDER — ACETAMINOPHEN 500 MG
500 TABLET ORAL EVERY 4 HOURS PRN
Qty: 20 TABLET | Refills: 0 | Status: SHIPPED | OUTPATIENT
Start: 2025-01-03 | End: 2025-01-08

## 2025-01-03 RX ORDER — OXYCODONE AND ACETAMINOPHEN 5; 325 MG/1; MG/1
1 TABLET ORAL
Status: COMPLETED | OUTPATIENT
Start: 2025-01-03 | End: 2025-01-03

## 2025-01-03 RX ORDER — PREDNISOLONE SODIUM PHOSPHATE 15 MG/5ML
15 SOLUTION ORAL DAILY
Qty: 25 ML | Refills: 0 | Status: SHIPPED | OUTPATIENT
Start: 2025-01-03 | End: 2025-01-08

## 2025-01-03 RX ORDER — IBUPROFEN 600 MG/1
600 TABLET ORAL EVERY 6 HOURS PRN
Qty: 20 TABLET | Refills: 0 | Status: SHIPPED | OUTPATIENT
Start: 2025-01-03 | End: 2025-01-08

## 2025-01-03 RX ADMIN — OXYCODONE HYDROCHLORIDE AND ACETAMINOPHEN 1 TABLET: 5; 325 TABLET ORAL at 10:01

## 2025-01-03 RX ADMIN — KETOROLAC TROMETHAMINE 30 MG: 30 INJECTION, SOLUTION INTRAMUSCULAR; INTRAVENOUS at 10:01

## 2025-01-03 RX ADMIN — DEXAMETHASONE SODIUM PHOSPHATE 6 MG: 4 INJECTION INTRA-ARTICULAR; INTRALESIONAL; INTRAMUSCULAR; INTRAVENOUS; SOFT TISSUE at 10:01

## 2025-01-04 NOTE — ED PROVIDER NOTES
Encounter Date: 1/3/2025    SCRIBE #1 NOTE: I, Tayler Munoz, am scribing for, and in the presence of,  Maria Victoria Goyal PA-C. I have scribed the following portions of the note - Other sections scribed: HPI, ROS, PE.     History     Chief Complaint   Patient presents with    Hand Pain     PT REPORTS INTERMITTENT SWELLING, PAIN AND TINGLING ON THE BOTH HAND H/O CARPEL TUNNEL SYNDROME  WORSEN AFTER POSTPARTUM.     CC: bilateral wrist pain    HPI: 25 y/o F, with a PMHx of carpal tunnel syndrome, presents for evaluation of constant daily bilateral wrist pain and tingling that began 3 months ago. She describes the pain as shooting and burning in characteristics. She states the pain and tingling is mainly localized to her wrists but will radiate up to her elbows.  Patient reports she had her first baby 3 weeks ago and states she was diagnosed with carpal tunnel syndrome while she was 7 months pregnant. She reports her symptoms have worsened since then and she almost dropped her baby into the tub due to her wrists giving out and associated wrist pain and weakness. She states when she re-gripped her hands, she felt shooting wrist pain. Patient reports taking Tylenol and ibuprofen without improvement and using warm compresses and towels with minimal relief. She has not seen an orthopedist for her symptoms. She states she has minimal finger pain and denies hand pain. She states her fingers lock up in the morning but that is resolved when she moves them. Patient reports history of hives after taking morphine after surgery but states she has not had reactions to morphine-containing medications since. Denies history of neck pain or shoulder pain during pregnancy. Denies headache, speech difficulty, dizziness, blurry vision, confusion, or numbness.     The history is provided by the patient. No  was used.     Review of patient's allergies indicates:   Allergen Reactions    Latex, natural rubber Itching     Opioids - morphine analogues Hives     Past Medical History:   Diagnosis Date    Asthma     childhood    Constipation     Depression     Ectopic pregnancy      Past Surgical History:   Procedure Laterality Date    EXPLORATORY LAPAROTOMY      ECTOPIC    SALPINGECTOMY Left 12/07/2019    Procedure: SALPINGECTOMY;  Surgeon: Felecia Duran MD;  Location: [facility];  Service: OB/GYN;  Laterality: Left;     Family History   Problem Relation Name Age of Onset    Breast cancer Neg Hx      Colon cancer Neg Hx      Ovarian cancer Neg Hx       Social History     Tobacco Use    Smoking status: Never    Smokeless tobacco: Never   Substance Use Topics    Alcohol use: Not Currently     Comment: OCCASIONAL    Drug use: Not Currently     Types: Marijuana     Review of Systems   Constitutional:  Negative for chills and fever.   HENT:  Negative for congestion, ear pain, nosebleeds, rhinorrhea, sore throat and trouble swallowing.         (-) speech problem   Eyes:  Negative for redness and visual disturbance.   Respiratory:  Negative for cough, shortness of breath and stridor.    Cardiovascular:  Negative for chest pain.   Gastrointestinal:  Negative for abdominal pain, constipation, diarrhea, nausea and vomiting.   Genitourinary:  Negative for decreased urine volume, dysuria, frequency, hematuria and urgency.   Musculoskeletal:  Positive for arthralgias (bilateral wrists; radiates up to elbows). Negative for back pain and neck pain.        (-) hand pain   Skin:  Negative for rash and wound.   Neurological:  Negative for dizziness, speech difficulty, weakness, light-headedness, numbness and headaches.        (+) bilateral wrist tingling; radiates up to elbows   Psychiatric/Behavioral:  Negative for confusion.        Physical Exam     Initial Vitals [01/03/25 2051]   BP Pulse Resp Temp SpO2   96/66 81 18 98.2 °F (36.8 °C) 98 %      MAP       --         Physical Exam    Nursing note and vitals reviewed.  Constitutional: She appears  "well-developed and well-nourished. She is not diaphoretic. No distress.   HENT:   Head: Normocephalic and atraumatic.   Right Ear: External ear normal.   Left Ear: External ear normal.   Eyes: Conjunctivae and EOM are normal. Right eye exhibits no discharge. Left eye exhibits no discharge. No scleral icterus.   Neck: Neck supple. No tracheal deviation present.   Pulmonary/Chest: No stridor. No respiratory distress.   Musculoskeletal:         General: Normal range of motion.      Cervical back: Neck supple. No tenderness.      Comments: No midline tenderness.      Neurological: She is alert and oriented to person, place, and time. No sensory deficit.   Skin: Skin is warm and dry. No rash noted. No erythema.   Psychiatric: She has a normal mood and affect. Her behavior is normal. Judgment and thought content normal.       Hand/Wrist    Hand/Wrist neurovascular additional comments: Pain with resisted extension of wrists. 4/5  strength.     Hand/Wrist      Right      Finkelstein's test: positive      Left      Finkelstein's test: positive      ED Course   Procedures  Labs Reviewed   POCT URINE PREGNANCY          Imaging Results    None          Medications   ketorolac injection 30 mg (has no administration in time range)   dexAMETHasone injection 6 mg (has no administration in time range)   oxyCODONE-acetaminophen 5-325 mg per tablet 1 tablet (has no administration in time range)     Medical Decision Making  24-year-old female presenting for evaluation of bilateral wrist pain.  She reports she occasionally loses  strength and almost dropped her baby in the bathtub today due to her wrist "giving out. " she reports she was diagnosed with carpal tunnel however denies any numbness or tingling in the distribution of the median nerve.  No midline tenderness of the spine.  Considered low suspicion for cervical radiculopathy.  Does have pain with extension of the wrist bilaterally.  No neurovascular deficits.  Think " this is likely tendinitis and possible de Quervain tenosynovitis.  Velcro thumb spica splint was applied to both wrists as to support the wrist and prevent loss of  especially since pt is caring for her child.  Toradol IM, Percocet p.o. and Decadron IM given in the ED.  Will discharge with medications for symptomatic treatment.  Refer to ortho for further evaluation management.  Will have the patient return ER for worsening or as needed.    Amount and/or Complexity of Data Reviewed  Labs: ordered.    Risk  OTC drugs.  Prescription drug management.            Scribe Attestation:   Scribe #1: I performed the above scribed service and the documentation accurately describes the services I performed. I attest to the accuracy of the note.                           I, Maria Victoria Goyal PA-C , personally performed the services described in this documentation. All medical record entries made by the scribe were at my direction and in my presence. I have reviewed the chart and agree that the record reflects my personal performance and is accurate and complete.      DISCLAIMER: This note was prepared with SMS [employer] voice recognition transcription software. Garbled syntax, mangled pronouns, and other bizarre constructions may be attributed to that software system.   Clinical Impression:  Final diagnoses:  [M77.8] Tendonitis of both wrists (Primary)                 Maria Victoria Goyal PA-C  01/04/25 [id number]

## 2025-01-04 NOTE — DISCHARGE INSTRUCTIONS

## 2025-01-11 NOTE — PROGRESS NOTES
Past medical, surgical, social, family, and obstetric histories; medications; prior records and results; and available outside records were reviewed and updated in the EMR.  Pertinent findings were noted below.    Reason for Visit   No chief complaint on file.    HPI   24 y.o. female     Patient's last menstrual period was 2024.    Delivery:  on 24 with IUD placement  Hospitalization: Uncomplicated  Ambulating, tolerating Po, moving bowels: Y  Pain controlled: Bilateral labial irritation/ burning pain.   Bleeding: Y  Breastfeeding: N  Breast pain or engorgement: N  Postpartum depression: N  Incision: N/A  Contraception: Mirena    Pap:  NILM  Allergies: Latex, natural rubber and Opioids - morphine analogues    Exam   LMP 2024     Physical Exam    Genitourinary: There is tenderness and injury on the right labia. There is no rash or lesion on the right labia. There is tenderness and injury on the left labia. There is no lesion on the left labia.    Labial Exam Comments:  Bilateral labial lacerations well healing with sutures still visible, cut suture to skin line, mild exudate visualized .       There is bleeding in the vagina. No erythema, vaginal discharge, tenderness, vaginal ulcerations or lesions in the vagina.    No lesions in the vagina.   IUD strings visualized.Cervix exhibits no lesion, no discharge and no friability.    Cervical exam comments: IUD strings approximately 5 cm .         Assessment and Plan   Postpartum exam    IUD check up      Postpartum  Doing well  Exam: well healing bilateral labial lacerations with sutures still visualized   Mood / depression screenin  Lactation referral: N/A  Contraception: Mirena IU, strings visualized appropriate length  Discussed symptomatic management of tenderness at laceration sites. Continue with vulvar hygiene, witch hazel, dilution of urine, and ibuprofen PRN.     Follow-up: PRN or for annual WWE    Marva Finley MD/MPH  OB/GYN  PGY-4  [facility]      Seen and examined.  Agree with note.  All questions answered  TAMIR Malcolm MD

## 2025-01-13 ENCOUNTER — POSTPARTUM VISIT (OUTPATIENT)
Dept: OBSTETRICS AND GYNECOLOGY | Facility: CLINIC | Age: 25
End: 2025-01-13
Payer: MEDICAID

## 2025-01-13 VITALS
DIASTOLIC BLOOD PRESSURE: 68 MMHG | WEIGHT: 138.88 LBS | HEIGHT: 63 IN | BODY MASS INDEX: 24.61 KG/M2 | SYSTOLIC BLOOD PRESSURE: 100 MMHG

## 2025-01-13 DIAGNOSIS — Z30.431 IUD CHECK UP: ICD-10-CM

## 2025-01-13 PROCEDURE — 99213 OFFICE O/P EST LOW 20 MIN: CPT | Mod: PBBFAC | Performed by: OBSTETRICS & GYNECOLOGY

## 2025-01-13 PROCEDURE — 99999 PR PBB SHADOW E&M-EST. PATIENT-LVL III: CPT | Mod: PBBFAC,,, | Performed by: OBSTETRICS & GYNECOLOGY

## 2025-01-13 RX ORDER — LIDOCAINE HYDROCHLORIDE 20 MG/ML
JELLY TOPICAL
Qty: 30 ML | Refills: 1 | Status: SHIPPED | OUTPATIENT
Start: 2025-01-13 | End: 2025-01-13

## 2025-01-13 RX ORDER — LIDOCAINE HYDROCHLORIDE 20 MG/ML
JELLY TOPICAL
Qty: 30 ML | Refills: 1 | Status: SHIPPED | OUTPATIENT
Start: 2025-01-13 | End: 2025-01-13 | Stop reason: CLARIF

## 2025-01-29 ENCOUNTER — OFFICE VISIT (OUTPATIENT)
Dept: OBSTETRICS AND GYNECOLOGY | Facility: CLINIC | Age: 25
End: 2025-01-29
Attending: OBSTETRICS & GYNECOLOGY
Payer: MEDICAID

## 2025-01-29 VITALS
WEIGHT: 133.63 LBS | DIASTOLIC BLOOD PRESSURE: 62 MMHG | BODY MASS INDEX: 23.68 KG/M2 | HEIGHT: 63 IN | SYSTOLIC BLOOD PRESSURE: 118 MMHG

## 2025-01-29 DIAGNOSIS — Z30.432 ENCOUNTER FOR REMOVAL OF INTRAUTERINE CONTRACEPTIVE DEVICE (IUD): ICD-10-CM

## 2025-01-29 PROBLEM — O26.00 EXCESSIVE WEIGHT GAIN DURING PREGNANCY, ANTEPARTUM: Status: RESOLVED | Noted: 2024-11-19 | Resolved: 2025-01-29

## 2025-01-29 PROBLEM — D62 ACUTE BLOOD LOSS ANEMIA (ABLA): Status: RESOLVED | Noted: 2024-12-12 | Resolved: 2025-01-29

## 2025-01-29 PROBLEM — O09.90 PREGNANCY, SUPERVISION, HIGH-RISK: Status: RESOLVED | Noted: 2024-06-04 | Resolved: 2025-01-29

## 2025-01-29 PROBLEM — O99.519 MATERNAL ASTHMA COMPLICATING PREGNANCY: Status: RESOLVED | Noted: 2024-06-04 | Resolved: 2025-01-29

## 2025-01-29 PROBLEM — O99.340 MENTAL DISORDER AFFECTING PREGNANCY: Status: RESOLVED | Noted: 2024-06-04 | Resolved: 2025-01-29

## 2025-01-29 PROBLEM — J45.909 MATERNAL ASTHMA COMPLICATING PREGNANCY: Status: RESOLVED | Noted: 2024-06-04 | Resolved: 2025-01-29

## 2025-01-29 PROBLEM — O99.013 ANEMIA AFFECTING PREGNANCY IN THIRD TRIMESTER: Status: RESOLVED | Noted: 2024-10-08 | Resolved: 2025-01-29

## 2025-01-29 PROBLEM — B95.1 POSITIVE GBS TEST: Status: RESOLVED | Noted: 2024-11-21 | Resolved: 2025-01-29

## 2025-01-29 PROBLEM — O13.9 GESTATIONAL HYPERTENSION: Status: RESOLVED | Noted: 2024-12-12 | Resolved: 2025-01-29

## 2025-01-29 PROCEDURE — 99999 PR PBB SHADOW E&M-EST. PATIENT-LVL III: CPT | Mod: PBBFAC,,, | Performed by: OBSTETRICS & GYNECOLOGY

## 2025-01-29 PROCEDURE — 99213 OFFICE O/P EST LOW 20 MIN: CPT | Mod: PBBFAC | Performed by: OBSTETRICS & GYNECOLOGY

## 2025-01-29 PROCEDURE — 58301 REMOVE INTRAUTERINE DEVICE: CPT | Mod: PBBFAC

## 2025-01-29 RX ORDER — HYDROXYZINE HYDROCHLORIDE 25 MG/1
25 TABLET, FILM COATED ORAL EVERY 12 HOURS PRN
Qty: 20 TABLET | Refills: 0 | Status: SHIPPED | OUTPATIENT
Start: 2025-01-29

## 2025-01-29 NOTE — PROGRESS NOTES
"Past medical, surgical, social, family, and obstetric histories; medications; prior records and results; and available outside records were reviewed and updated in the EMR.  Pertinent findings were noted below.    Reason for Visit   No chief complaint on file.    HPI   24 y.o. female  who presents today for continued vaginal bleeding with the desires to have her IUD removed. She also reports a history of PPD and anxiety. She reports that since her son's leg injury she has been having significant anxiety. She reports that she is unable to rest at night and she reports thoughts of self harm. She has a history of cutting and reports the desire to self-harm in this way at this time. She has not acted on this urge. She reports daily crying. She has tried prozac in the past, when she was 14 years old, however she reports that she was not taking this consistently. She has spoken to a family friend who is a counselor, but desires to speak with someone else at this time.    Patient's last menstrual period was 2024.    Delivery:  on 24 with IUD placement  Hospitalization: Uncomplicated  Ambulating, tolerating Po, moving bowels: Y  Pain controlled: declines  Bleeding: Y  Breastfeeding: N  Breast pain or engorgement: N  Postpartum depression: N  Incision: N/A  Contraception: Mirena      Pap:  NILM  Allergies: Latex, natural rubber and Opioids - morphine analogues    Exam   /62   Ht 5' 3" (1.6 m)   Wt 60.6 kg (133 lb 9.6 oz)   LMP 2024 Comment: IUD  Breastfeeding No   BMI 23.67 kg/m²     Physical Exam  Constitutional:       Appearance: Normal appearance.     Genitourinary:    Genitourinary Comments: Dark yeni blood in the vaginal vault. IUD strings visible.     HENT:      Head: Normocephalic and atraumatic.   Eyes:      Extraocular Movements: Extraocular movements intact.   Pulmonary:      Effort: Pulmonary effort is normal. No respiratory distress.   Abdominal:      General: Abdomen is " flat.      Palpations: Abdomen is soft.      Tenderness: There is no abdominal tenderness. There is no guarding or rebound.   Musculoskeletal:         General: Normal range of motion.      Cervical back: Normal range of motion.   Neurological:      General: No focal deficit present.      Mental Status: She is alert and oriented to person, place, and time. Mental status is at baseline.   Skin:     General: Skin is warm and dry.   Psychiatric:         Mood and Affect: Mood normal.         Behavior: Behavior normal.         Thought Content: Thought content normal.         Judgment: Judgment normal.       Assessment and Plan   Postpartum depression  -     Ambulatory referral/consult to Psychiatry; Future; Expected date: 02/05/2025    Encounter for removal of intrauterine contraceptive device (IUD)  -     Removal of IUD    Other orders  -     hydrOXYzine HCL (ATARAX) 25 MG tablet; Take 1 tablet (25 mg total) by mouth every 12 (twelve) hours as needed for Itching (anxiety).  Dispense: 20 tablet; Refill: 0      Postpartum  Doing well  Exam: as above  Mood / depression screening: positive  Lactation referral: N/A  Contraception: desires IUD removal - please see separate procedure note for details.  Referral to Dr. De La Paz placed at this time.   Dr. De La Paz messaged  Hydroxyzine Rx sent the patients preferred pharmacy  Will defer medication management to Dr. De La Paz at this time    Follow-up: Patient to message on Friday to ensure that appointment with Psych is set up  Will schedule follow up with OB at that time.    Catherine Cavanaugh MD  Obstetrics and Gynecology - PGY2

## 2025-01-29 NOTE — PROCEDURES
Removal of IUD    Date/Time: 1/29/2025 11:00 AM    Performed by: Catherine Cavanaugh MD  Authorized by: Catherine Cavanaugh MD    Consent given by:  Patient  Procedure risks and benefits discussed: yes    Patient questions answered: yes    Patient agrees, verbalizes understanding, and wants to proceed: yes    Educational handouts given: no    Instructions and paperwork completed: yes    Implant grasped by: ring forceps  Removal due to infection and inflammatory reaction: no    Other reason for removal:  Continued vaginal bleeding  Removal due to mechanical complications: no    Removed with no complications: yes     Catherine Cavanaugh MD  Obstetrics and Gynecology - PGY2

## 2025-02-27 ENCOUNTER — TELEPHONE (OUTPATIENT)
Dept: OBSTETRICS AND GYNECOLOGY | Facility: CLINIC | Age: 25
End: 2025-02-27
Payer: MEDICAID

## 2025-02-27 NOTE — TELEPHONE ENCOUNTER
----- Message from Shannan sent at 2/27/2025  1:53 PM CST -----  Regarding: letter needed  Name of Who is Calling: Ellen What is the request in detail: Patient is requesting a call back to have a letter sent to her portal with the date she had RSV vaccine she she can give it to the pediatrician.  Can the clinic reply by [facility]: Yes What Number to Call Back if not in [facility]:  [phone]

## 2025-04-02 ENCOUNTER — HOSPITAL ENCOUNTER (EMERGENCY)
Facility: HOSPITAL | Age: 25
Discharge: HOME | End: 2025-04-02
Attending: EMERGENCY MEDICINE
Payer: MEDICAID

## 2025-04-02 VITALS
RESPIRATION RATE: 18 BRPM | SYSTOLIC BLOOD PRESSURE: 96 MMHG | BODY MASS INDEX: 23.57 KG/M2 | OXYGEN SATURATION: 100 % | TEMPERATURE: 99 F | DIASTOLIC BLOOD PRESSURE: 56 MMHG | WEIGHT: 133 LBS | HEIGHT: 63 IN | HEART RATE: 85 BPM

## 2025-04-02 DIAGNOSIS — G56.03 BILATERAL CARPAL TUNNEL SYNDROME: Primary | ICD-10-CM

## 2025-04-02 LAB
B-HCG UR QL: NEGATIVE
CTP QC/QA: YES

## 2025-04-02 PROCEDURE — 81025 URINE PREGNANCY TEST: CPT | Mod: ER | Performed by: EMERGENCY MEDICINE

## 2025-04-02 PROCEDURE — 81025 URINE PREGNANCY TEST: CPT | Mod: ER

## 2025-04-02 PROCEDURE — 99284 EMERGENCY DEPT VISIT MOD MDM: CPT | Mod: 25,ER

## 2025-04-02 RX ORDER — IBUPROFEN 600 MG/1
600 TABLET ORAL EVERY 6 HOURS PRN
Qty: 20 TABLET | Refills: 0 | Status: SHIPPED | OUTPATIENT
Start: 2025-04-02

## 2025-04-02 RX ORDER — DICLOFENAC SODIUM 10 MG/G
2 GEL TOPICAL 4 TIMES DAILY PRN
Qty: 200 G | Refills: 0 | Status: SHIPPED | OUTPATIENT
Start: 2025-04-02

## 2025-04-02 RX ORDER — ACETAMINOPHEN 500 MG
500 TABLET ORAL EVERY 6 HOURS PRN
Qty: 30 TABLET | Refills: 0 | Status: SHIPPED | OUTPATIENT
Start: 2025-04-02

## 2025-04-02 RX ORDER — PREDNISONE 20 MG/1
40 TABLET ORAL DAILY
Qty: 10 TABLET | Refills: 0 | Status: SHIPPED | OUTPATIENT
Start: 2025-04-02 | End: 2025-04-07

## 2025-04-02 RX ORDER — METHOCARBAMOL 500 MG/1
1000 TABLET, FILM COATED ORAL 3 TIMES DAILY
Qty: 30 TABLET | Refills: 0 | Status: SHIPPED | OUTPATIENT
Start: 2025-04-02 | End: 2025-04-07

## 2025-04-02 NOTE — ED PROVIDER NOTES
Encounter Date: 4/2/2025    SCRIBE #1 NOTE: I, Sigrid Kelly, am scribing for, and in the presence of,  Annabelle Desai DO. I have scribed the following portions of the note - Other sections scribed: HPI,ROS,PE,MDM.       History     Chief Complaint   Patient presents with    Hand Pain     BILATERAL HAND PAIN STARTING LAST WEEK   WAS SEEN AT  and got a steroid shot but no improvement in pain      Ellen Cardenas is a 24 y.o. female with Hx of constipation, depression, ectopic pregnancy, who presents to the ED for chief complaint of chronic bilateral wrist pain that began 10 months ago. Reports exacerbation of pain when picking up her baby. Denies any recent trauma/mechanical injury. Patient reports multiple ED visits for similar complaint. She reports she was diagnosed with carpal tunnel in the past. States she lost her wrist brace. She reports prior visit to this ED, medication given at the time provided relief. Denies other alleviating or exacerbation factors. Patient denies any other complaints at this time.     Per chart review 3/21/25, patient presented to  ED for complaint of chronic BL wrist pain. Patient was given 1 dose of systemic corticosteroids. Patient was discharged with dexamethasone and ondansetron. She was advised to follow with PCP.     The history is provided by the patient. No  was used.     Review of patient's allergies indicates:   Allergen Reactions    Latex, natural rubber Itching    Opioids - morphine analogues Hives     Past Medical History:   Diagnosis Date    Asthma     childhood    Constipation     Depression     Ectopic pregnancy      Past Surgical History:   Procedure Laterality Date    EXPLORATORY LAPAROTOMY      ECTOPIC    SALPINGECTOMY Left 12/07/2019    Procedure: SALPINGECTOMY;  Surgeon: Felecia Duran MD;  Location: [facility];  Service: OB/GYN;  Laterality: Left;     Family History   Problem Relation Name Age of Onset    Breast cancer Neg Hx      Colon  cancer Neg Hx      Ovarian cancer Neg Hx       Social History[1]  Review of Systems   Constitutional:  Negative for fever.   HENT:  Negative for rhinorrhea and sore throat.    Eyes:  Negative for redness.   Respiratory:  Negative for shortness of breath.    Cardiovascular:  Negative for chest pain and leg swelling.   Gastrointestinal:  Negative for abdominal pain, diarrhea, nausea and vomiting.   Musculoskeletal:  Positive for arthralgias (bilateral wrist pain). Negative for back pain.   Skin:  Negative for rash.   Neurological:  Negative for syncope and headaches.   All other systems reviewed and are negative.      Physical Exam     Initial Vitals [04/02/25 1416]   BP Pulse Resp Temp SpO2   (!) 91/56 82 20 98.6 °F (37 °C) 98 %      MAP       --         Physical Exam    Nursing note and vitals reviewed.  Constitutional: She appears well-developed and well-nourished.   HENT:   Head: Normocephalic and atraumatic.   Right Ear: External ear normal.   Left Ear: External ear normal.   Nose: Nose normal. Mouth/Throat: Oropharynx is clear and moist.   Eyes: Conjunctivae and EOM are normal. Pupils are equal, round, and reactive to light.   Neck: Phonation normal. Neck supple.   Normal range of motion.  Cardiovascular:  Normal rate, regular rhythm, normal heart sounds and intact distal pulses.     Exam reveals no gallop and no friction rub.       No murmur heard.  Pulmonary/Chest: Effort normal and breath sounds normal. No stridor. No respiratory distress. She has no wheezes. She has no rhonchi. She has no rales. She exhibits no tenderness.   Abdominal: Abdomen is soft. Bowel sounds are normal. She exhibits no distension. There is no abdominal tenderness. There is no rigidity, no rebound and no guarding.   Musculoskeletal:         General: No tenderness or edema. Normal range of motion.      Cervical back: Normal range of motion and neck supple.      Comments: There is a positive tinel's test bilaterally      Neurological:  She is alert and oriented to person, place, and time. She has normal strength. No cranial nerve deficit or sensory deficit. GCS score is 15. GCS eye subscore is 4. GCS verbal subscore is 5. GCS motor subscore is 6.   Skin: Skin is warm and dry. Capillary refill takes less than 2 seconds. No rash noted.   Psychiatric: She has a normal mood and affect. Her behavior is normal.         ED Course   Procedures  Labs Reviewed   POCT URINE PREGNANCY       Result Value    POC Preg Test, Ur Negative       Acceptable Yes            Imaging Results    None          Medications - No data to display  Medical Decision Making  Amount and/or Complexity of Data Reviewed  External Data Reviewed: notes.     Details: See HPI   Labs: ordered. Decision-making details documented in ED Course.    Risk  OTC drugs.  Prescription drug management.    Medical Decision Making:    This is an evaluation of a 24 y.o. female that presents to the Emergency Department for   Chief Complaint   Patient presents with    Hand Pain     BILATERAL HAND PAIN STARTING LAST WEEK   WAS SEEN AT  and got a steroid shot but no improvement in pain        Independent historian: (parent/ EMS/ spouse/ etc) none    The patient is a non-toxic and well appearing patient. On physical exam, patient appears well hydrated with moist mucus membranes. Breath sounds are clear and equal bilaterally with no adventitious breath sounds, tachypnea or respiratory distress. Regular rate and rhythm. No murmurs. Abdomen soft and non tender. There is a positive Tinel's test bilaterally. Patient is tolerating PO without difficulty. Physical exam otherwise as above.     I have reviewed vital signs and nursing notes.   Vital Signs Are Reassuring.     Based on the patient's symptoms, I am considering and evaluating for the following differential diagnoses: pregnancy, left wrist pain, right wrist pain, left carpal tunnel, right carpal tunnel    ED Course:Treatment in the ED  included Physical Exam and medications given in ED  Medications - No data to display.   Patient reports feeling better after treatment in the ER.   Vital signs reviewed  Nurse's notes reviewed  External Data/Documents Reviewed: Previous medical records and vital signs reviewed, see HPI and Physical exam.   Labs: ordered and reviewed.  Pregnancy test negative  Orthopedic referral placed.    Risk  Diagnosis or treatment significantly limited by the following social determinants of health: Body mass index is 23.56 kg/m².     In shared decision making with the patient, we discussed treatment, prescriptions, labs, and imaging results.    Discharge home with   ED Prescriptions       Medication Sig Dispense Start Date End Date Auth. Provider    predniSONE (DELTASONE) 20 MG tablet Take 2 tablets (40 mg total) by mouth once daily. for 5 days 10 tablet 4/2/2025 4/7/2025 Annabelle Desai DO    methocarbamoL (ROBAXIN) 500 MG Tab Take 2 tablets (1,000 mg total) by mouth 3 (three) times daily. for 5 days 30 tablet 4/2/2025 4/7/2025 Annabelle Desai DO    acetaminophen (TYLENOL) 500 MG tablet Take 1 tablet (500 mg total) by mouth every 6 (six) hours as needed for Pain (As needed for mild-to-moderate pain). 30 tablet 4/2/2025 -- Annabelle Desai DO    diclofenac sodium (VOLTAREN) 1 % Gel Apply 2 g topically 4 (four) times daily as needed (Apply to painful area 4 times a day as needed for pain). 200 g 4/2/2025 -- Annabelle Desai,     ibuprofen (ADVIL,MOTRIN) 600 MG tablet Take 1 tablet (600 mg total) by mouth every 6 (six) hours as needed for Pain (Take with food as needed for mild-to-moderate pain). 20 tablet 4/2/2025 -- Annabelle Desai DO          Fill and take prescriptions as directed.  Return to the ED if symptoms worsen or do not resolve.   Answered questions and discussed discharge plan.    .  Follow up with PCP/specialist in 1 day    The following labs and imaging were reviewed:    Admission on 04/02/2025, Discharged on 04/02/2025    Component Date Value Ref Range Status    POC Preg Test, Ur 04/02/2025 Negative  Negative Final     Acceptable 04/02/2025 Yes   Final        Imaging Results    None               Scribe Attestation:   Scribe #1: I performed the above scribed service and the documentation accurately describes the services I performed. I attest to the accuracy of the note.                              I, Dr. Annabelle Desai, personally performed the services described in this documentation. This document was produced by a scribe under my direction and in my presence. All medical record entries made by the scribe were at my direction and in my presence.  I have reviewed the chart and agree that the record reflects my personal performance and is accurate and complete. Annabelle Desai DO.     04/03/2025 6:24 AM    Clinical Impression:  Final diagnoses:  [G56.03] Bilateral carpal tunnel syndrome (Primary)          ED Disposition Condition    Discharge Stable          ED Prescriptions       Medication Sig Dispense Start Date End Date Auth. Provider    predniSONE (DELTASONE) 20 MG tablet Take 2 tablets (40 mg total) by mouth once daily. for 5 days 10 tablet 4/2/2025 4/7/2025 Annabelle Desai DO    methocarbamoL (ROBAXIN) 500 MG Tab Take 2 tablets (1,000 mg total) by mouth 3 (three) times daily. for 5 days 30 tablet 4/2/2025 4/7/2025 Annabelle Desai DO    acetaminophen (TYLENOL) 500 MG tablet Take 1 tablet (500 mg total) by mouth every 6 (six) hours as needed for Pain (As needed for mild-to-moderate pain). 30 tablet 4/2/2025 -- Annabelle Desai DO    diclofenac sodium (VOLTAREN) 1 % Gel Apply 2 g topically 4 (four) times daily as needed (Apply to painful area 4 times a day as needed for pain). 200 g 4/2/2025 -- Annabelle Desai DO    ibuprofen (ADVIL,MOTRIN) 600 MG tablet Take 1 tablet (600 mg total) by mouth every 6 (six) hours as needed for Pain (Take with food as needed for mild-to-moderate pain). 20 tablet 4/2/2025 -- Foster,  DO Annabelle          Follow-up Information       Follow up With Specialties Details Why Contact Info Additional Information    Kenneth Ling MD Orthopedic Surgery Schedule an appointment as soon as possible for a visit in 2 days Follow up for further evaluation of your wrist pain [address]  [address]  [facility] [address]  [phone]       Paulino Naylor MD Orthopedic Surgery Schedule an appointment as soon as possible for a visit in 1 day Follow up for further evaluation your wrist pain [address] [facility]  [address]  [place] [address]  [phone]       [facility] Family Medicine Schedule an appointment as soon as possible for a visit in 1 day Follow up for further evaluation of your wrist pain [address]  [address]  [facility] [address] [phone] Please park in [address] or [address] and use [address]. Take Medical Office Bldg. elevators.               [1]   Social History  Tobacco Use    Smoking status: Never    Smokeless tobacco: Never   Substance Use Topics    Alcohol use: Not Currently     Comment: OCCASIONAL    Drug use: Not Currently     Types: Marijuana        Annabelle Desai DO  04/03/25 0640

## 2025-04-02 NOTE — Clinical Note
"Ellen Navarreteabiola Cardenas was seen and treated in our emergency department on 4/2/2025.  She may return to work on 04/04/2025.       If you have any questions or concerns, please don't hesitate to call.      Annabelle Desai, DO"

## 2025-04-24 ENCOUNTER — HOSPITAL ENCOUNTER (EMERGENCY)
Facility: HOSPITAL | Age: 25
Discharge: HOME | End: 2025-04-24
Attending: STUDENT IN AN ORGANIZED HEALTH CARE EDUCATION/TRAINING PROGRAM
Payer: MEDICAID

## 2025-04-24 VITALS
TEMPERATURE: 99 F | DIASTOLIC BLOOD PRESSURE: 67 MMHG | SYSTOLIC BLOOD PRESSURE: 104 MMHG | WEIGHT: 133 LBS | HEIGHT: 63 IN | OXYGEN SATURATION: 99 % | BODY MASS INDEX: 23.57 KG/M2 | HEART RATE: 92 BPM | RESPIRATION RATE: 20 BRPM

## 2025-04-24 DIAGNOSIS — N76.0 ACUTE VAGINITIS: Primary | ICD-10-CM

## 2025-04-24 LAB
B-HCG UR QL: NEGATIVE
BILIRUBIN, POC UA: NEGATIVE
BLOOD, POC UA: ABNORMAL
CLARITY, UA: CLEAR
COLOR, UA: YELLOW
CTP QC/QA: YES
GLUCOSE, POC UA: NEGATIVE
KETONES, POC UA: NEGATIVE
LEUKOCYTE EST, POC UA: NEGATIVE
NITRITE, POC UA: NEGATIVE
PH UR STRIP: 7 [PH] (ref 5–8)
PROTEIN, POC UA: NEGATIVE
SPECIFIC GRAVITY, POC UA: 1.02 (ref 1–1.03)
UROBILINOGEN, POC UA: 0.2 E.U./DL

## 2025-04-24 PROCEDURE — 81025 URINE PREGNANCY TEST: CPT | Mod: ER

## 2025-04-24 PROCEDURE — 99284 EMERGENCY DEPT VISIT MOD MDM: CPT | Mod: 25,ER

## 2025-04-24 PROCEDURE — 25000003 PHARM REV CODE 250: Mod: ER | Performed by: NURSE PRACTITIONER

## 2025-04-24 PROCEDURE — 81025 URINE PREGNANCY TEST: CPT | Mod: ER | Performed by: NURSE PRACTITIONER

## 2025-04-24 PROCEDURE — 87591 N.GONORRHOEAE DNA AMP PROB: CPT | Performed by: STUDENT IN AN ORGANIZED HEALTH CARE EDUCATION/TRAINING PROGRAM

## 2025-04-24 PROCEDURE — 81515 NFCT DS BV&VAGINITIS DNA ALG: CPT | Performed by: STUDENT IN AN ORGANIZED HEALTH CARE EDUCATION/TRAINING PROGRAM

## 2025-04-24 RX ORDER — FLUCONAZOLE 150 MG/1
150 TABLET ORAL ONCE
Qty: 1 TABLET | Refills: 0 | Status: SHIPPED | OUTPATIENT
Start: 2025-04-27 | End: 2025-04-27

## 2025-04-24 RX ORDER — FLUCONAZOLE 150 MG/1
150 TABLET ORAL
Status: COMPLETED | OUTPATIENT
Start: 2025-04-24 | End: 2025-04-24

## 2025-04-24 RX ORDER — METRONIDAZOLE 500 MG/1
500 TABLET ORAL 2 TIMES DAILY
Qty: 14 TABLET | Refills: 0 | Status: SHIPPED | OUTPATIENT
Start: 2025-04-24 | End: 2025-05-01

## 2025-04-24 RX ADMIN — FLUCONAZOLE 150 MG: 150 TABLET ORAL at 09:04

## 2025-04-24 NOTE — Clinical Note
"Ellen Navarreteabiola Cardenas was seen and treated in our emergency department on 4/24/2025.  She may return to work on 04/25/2025.       If you have any questions or concerns, please don't hesitate to call.      Cait Coker RN    "

## 2025-04-25 NOTE — ED PROVIDER NOTES
Encounter Date: 4/24/2025    SCRIBE #1 NOTE: I, Sigrid Kelly, am scribing for, and in the presence of,  Alla Verduzco NP. I have scribed the following portions of the note - Other sections scribed: HPI,ROS,.       History     Chief Complaint   Patient presents with    Vaginal Discharge     Pt c/o vaginal irritation starting April 15th, started her period April 16th; currently having white vaginal discharge/itching; tried otc Monistat which caused the symptoms to worsen     CC: Vaginal discharge    HPI: 24 yo F, with a PMHx of asthma, constipation, depression, ectopic pregnancy , presents to ED with complaint of white, thick vaginal discharge with worsening vaginal itching that began 1 week ago. Patient reports symptoms feel similar to past yeast infections. She reports attempted treatment with Monistat to little relief. Denies any STD concerns. Patient reports symptoms are beginning to subside in severity while in ED. No other aggravating/alleviating factors. Denies fever, chills, abdominal pain, rash, nausea emesis, dysuria, hematuria,, malodorous urine, or other associated symptoms.       The history is provided by the patient. No  was used.     Review of patient's allergies indicates:   Allergen Reactions    Latex, natural rubber Itching    Opioids - morphine analogues Hives     Past Medical History:   Diagnosis Date    Asthma     childhood    Constipation     Depression     Ectopic pregnancy      Past Surgical History:   Procedure Laterality Date    EXPLORATORY LAPAROTOMY      ECTOPIC    SALPINGECTOMY Left 12/07/2019    Procedure: SALPINGECTOMY;  Surgeon: Felecia Duran MD;  Location: [facility];  Service: OB/GYN;  Laterality: Left;     Family History   Problem Relation Name Age of Onset    Breast cancer Neg Hx      Colon cancer Neg Hx      Ovarian cancer Neg Hx       Social History[1]  Review of Systems   Constitutional:  Negative for chills and fever.   HENT:  Negative for congestion and  "sore throat.    Eyes:  Negative for pain.   Respiratory:  Negative for shortness of breath and wheezing.    Cardiovascular:  Negative for chest pain.   Gastrointestinal:  Negative for abdominal pain, diarrhea, nausea and vomiting.   Genitourinary:  Positive for vaginal discharge (white thick) and vaginal pain ("itching"). Negative for dysuria, flank pain and frequency.        (-) malodorous urine    Musculoskeletal:  Negative for myalgias.   Skin:  Negative for color change and rash.   Neurological:  Negative for weakness and headaches.   Hematological:  Does not bruise/bleed easily.   Psychiatric/Behavioral:  Negative for suicidal ideas.        Physical Exam     Initial Vitals [04/24/25 1944]   BP Pulse Resp Temp SpO2   104/67 92 20 98.6 °F (37 °C) 99 %      MAP       --         Physical Exam    Constitutional: She appears well-developed and well-nourished. She is not diaphoretic. No distress.   HENT:   Head: Normocephalic and atraumatic.   Neck:   Normal range of motion.  Cardiovascular:  Normal rate, regular rhythm, normal heart sounds and intact distal pulses.           Pulmonary/Chest: Breath sounds normal. No respiratory distress.   Abdominal: Abdomen is soft. Bowel sounds are normal. There is no abdominal tenderness.   Genitourinary:    Uterus normal.   There is no rash, tenderness, lesion or injury on the right labia. There is no rash, tenderness, lesion or injury on the left labia. Cervix exhibits discharge. Cervix exhibits no motion tenderness and no friability. Right adnexum displays no mass, no tenderness and no fullness. Left adnexum displays no mass, no tenderness and no fullness.    Vaginal discharge present.      Genitourinary Comments: No rashes or skin lesions.  White discharge in the vagina.  No CMT or friability.  No adnexal fullness or tenderness with palpation.   exam chaperoned by WILBERTO Coker RN.     Musculoskeletal:         General: Normal range of motion.      Cervical back: Normal range of " motion.     Neurological: She is alert and oriented to person, place, and time.   Skin: Skin is warm and dry.   Psychiatric: She has a normal mood and affect. Her behavior is normal.         ED Course   Procedures  Labs Reviewed   POCT URINALYSIS W/O SCOPE - Abnormal       Result Value    Glucose, UA Negative      Bilirubin, UA Negative      Ketones, UA Negative      Spec Grav UA 1.025      Blood, UA Trace-intact (*)     PH, UA 7.0      Protein, UA Negative      Urobilinogen, UA 0.2      Nitrite, UA Negative      Leukocytes, UA Negative      Color, UA POC Yellow      Clarity, UA, POC Clear     C. TRACHOMATIS/N. GONORRHOEAE BY AMP DNA   VAGINOSIS SCREEN BY DNA PROBE   POCT URINE PREGNANCY    POC Preg Test, Ur Negative       Acceptable Yes     POCT URINALYSIS W/O SCOPE          Imaging Results    None          Medications   fluconazole tablet 150 mg (150 mg Oral Given 4/24/25 2114)     Medical Decision Making  25-year-old female presenting to the ED for evaluation of vaginal irritation and discharge.  Differentials include UTI, STI, vaginitis, urethritis, others.  Abdominal exam benign without tenderness, guarding, rigidity.  UPT negative.  UA without evidence of infection.   exam performed with findings suggesting vaginitis.  Patient denies concern for STI.  Will provide fluconazole and metronidazole.  Follow up with OBGYN.    Based on my clinical evaluation, I do not appreciate any immediate, emergent, or life threatening condition or etiology that warrants additional workup today.  I feel the patient can be discharged with close follow-up care.     Amount and/or Complexity of Data Reviewed  Labs: ordered. Decision-making details documented in ED Course.    Risk  Prescription drug management.            Scribe Attestation:   Scribe #1: I performed the above scribed service and the documentation accurately describes the services I performed. I attest to the accuracy of the note.        ED Course as  of 04/24/25 2139   u Apr 24, 2025 2048 hCG Qualitative, Urine: Negative [MM]      ED Course User Index  [MM] Alla Verduzco NP                         I, REY Verduzco, personally performed the services described in this documentation. All medical record entries made by the scribe were at my direction and in my presence. I have reviewed the chart and agree that the record reflects my personal performance and is accurate and complete.      DISCLAIMER: This note was prepared with [employer] voice recognition transcription software. Garbled syntax, mangled pronouns, and other bizarre constructions may be attributed to that software system.    Clinical Impression:  Final diagnoses:  [N76.0] Acute vaginitis (Primary)          ED Disposition Condition    Discharge Stable          ED Prescriptions       Medication Sig Dispense Start Date End Date Auth. Provider    metroNIDAZOLE (FLAGYL) 500 MG tablet Take 1 tablet (500 mg total) by mouth 2 (two) times a day. for 7 days 14 tablet 4/24/2025 5/1/2025 Alla Verduzco NP    fluconazole (DIFLUCAN) 150 MG Tab Take 1 tablet (150 mg total) by mouth once. for 1 dose 1 tablet 4/27/2025 4/27/2025 Alla Verduzco NP          Follow-up Information       Follow up With Specialties Details Why Contact Info    Concepcion Melo MD Obstetrics, Obstetrics and Gynecology Schedule an appointment as soon as possible for a visit  For follow-up [address] [place] [address]  [phone]      [facility] ED Emergency Medicine Go to  If symptoms worsen [address] [facility] [address] [phone]                 [1]   Social History  Tobacco Use    Smoking status: Never    Smokeless tobacco: Never   Substance Use Topics    Alcohol use: Not Currently     Comment: OCCASIONAL    Drug use: Not Currently     Types: Marijuana        Alla Verduzco NP  04/24/25 2139

## 2025-04-25 NOTE — DISCHARGE INSTRUCTIONS
Thank you for coming to our Emergency Department today. It is important to remember that some problems or medical conditions are difficult to diagnose and may not be found during your Emergency Department visit.     Be sure to follow up with your primary care doctor and review all labs/imaging/tests that were performed during your ER visit with them. Some labs/tests may be outside of the normal range and require non-emergent follow-up and further investigation to help diagnose/exclude/prevent complications or other potentially serious medical conditions that were not addressed during your ER visit.    If you do not have a primary care doctor, you may contact the one listed on your discharge paperwork or you may also call the [facility] Appointment Desk at [phone] to schedule an appointment and establish care with one. It is important to your health that you have a primary care doctor.    Please take all medications as directed. All medications may potentially have side-effects and it is impossible to predict which medications may give you side-effects or what side-effects (if any) they will give you.. If you feel that you are having a negative effect or side-effect of any medication you should immediately stop taking them and seek medical attention. If you feel that you are having a life-threatening reaction call 911.    Return to the ER with any questions/concerns, new/concerning symptoms, worsening or failure to improve.     Do not drive, swim, climb to height, take a bath, operate heavy machinery, drink alcohol or take potentially sedating medications, sign any legal documents or make any important decisions for 24 hours if you have received any pain medications, sedatives or mood altering drugs during your ER visit or within 24 hours of taking them if they have been prescribed to you.     You can find additional resources for Dentists, hearing aids, durable medical equipment, low cost pharmacies and  other resources at [email or web address]    BELOW THIS LINE ONLY APPLIES IF YOU HAVE A COVID TEST PENDING OR IF YOU HAVE BEEN DIAGNOSED WITH COVID:  Please access [facility] to review the results of your test. Until the results of your COVID test return, you should isolate yourself so as not to potentially spread illness to others.   If your COVID test returns positive, you should isolate yourself so as not to spread illness to others. After five full days, if you are feeling better and you have not had fever for 24 hours, you can return to your typical daily activities, but you must wear a mask around others for an additional 5 days.   If your COVID test returns negative and you are either unvaccinated or more than six months out from your two-dose vaccine and are not yet boosted, you should still quarantine for 5 full days followed by strict mask use for an additional 5 full days.   If your COVID test returns negative and you have received your 2-dose initial vaccine as well as a booster, you should continue strict mask use for 10 full days after the exposure.  For all those exposed, best practice includes a test at day 5 after the exposure. This can be a home test or a test through one of the many testing centers throughout our community.   Masking is always advised to limit the spread of COVID. Cdc.gov is an excellent site to obtain the latest up to date recommendations regarding COVID and COVID testing.     CDC Testing and Quarantine Guidelines for patients with exposure to a known-positive COVID-19 person:  A close exposure is defined as anyone who has had an exposure (masked or unmasked) to a known COVID -19 positive person within 6 feet of someone for a cumulative total of 15 minutes or more over a 24-hour period.   Vaccinated and/or if you recently had a positive covid test within 90 days do NOT need to quarantine after contact with someone who had COVID-19 unless you develop symptoms.   Fully vaccinated  people who have not had a positive test within 90 days, should get tested 3-5 days after their exposure, even if they don't have symptoms and wear a mask indoors in public for 14 days following exposure or until their test result is negative.      Unvaccinated and/or NOT had a positive test within 90 days and meet close exposure  You are required by CDC guidelines to quarantine for at least 5 days from time of exposure followed by 5 days of strict masking. It is recommended, but not required to test after 5 days, unless you develop symptoms, in which case you should test at that time.  If you get tested after 5 days and your test is positive, your 5 day period of isolation starts the day of the positive test.    If your exposure does not meet the above definition, you can return to your normal daily activities to include social distancing, wearing a mask and frequent handwashing.      Here is a link to guidance from the CDC:  https://www.cdc.gov/media/releases/2021/s1227-isolation-quarantine-guidance.html      [place] Dept Of Health Testing Sites:  https://ldh.la.gov/page/3934      [facility] website with testing locations and guidance:  [email or web address]

## 2025-04-27 LAB
BACTERIAL VAGINOSIS DNA (OHS): DETECTED
C TRACH DNA SPEC QL NAA+PROBE: NOT DETECTED
CANDIDA GLABRATA/KRUSEI DNA (OHS): NOT DETECTED
CANDIDA SPECIES DNA (OHS): DETECTED
CTGC SOURCE (OHS) ORD-325: NORMAL
N GONORRHOEA DNA UR QL NAA+PROBE: NOT DETECTED
TRICHOMONAS VAGINALIS DNA (OHS): DETECTED

## 2025-04-28 ENCOUNTER — RESULTS FOLLOW-UP (OUTPATIENT)
Dept: EMERGENCY MEDICINE | Facility: HOSPITAL | Age: 25
End: 2025-04-28
Payer: MEDICAID

## 2025-05-26 DIAGNOSIS — R52 PAIN: Primary | ICD-10-CM

## 2025-06-06 ENCOUNTER — RESULTS FOLLOW-UP (OUTPATIENT)
Dept: OBSTETRICS AND GYNECOLOGY | Facility: CLINIC | Age: 25
End: 2025-06-06

## 2025-06-06 ENCOUNTER — LAB VISIT (OUTPATIENT)
Dept: LAB | Facility: OTHER | Age: 25
End: 2025-06-06
Attending: ADVANCED PRACTICE MIDWIFE
Payer: MEDICAID

## 2025-06-06 ENCOUNTER — OFFICE VISIT (OUTPATIENT)
Dept: OBSTETRICS AND GYNECOLOGY | Facility: CLINIC | Age: 25
End: 2025-06-06
Payer: MEDICAID

## 2025-06-06 VITALS
WEIGHT: 122.38 LBS | BODY MASS INDEX: 21.68 KG/M2 | HEIGHT: 63 IN | DIASTOLIC BLOOD PRESSURE: 62 MMHG | SYSTOLIC BLOOD PRESSURE: 98 MMHG

## 2025-06-06 DIAGNOSIS — Z01.419 WELL WOMAN EXAM WITH ROUTINE GYNECOLOGICAL EXAM: Primary | ICD-10-CM

## 2025-06-06 DIAGNOSIS — Z30.09 BIRTH CONTROL COUNSELING: ICD-10-CM

## 2025-06-06 DIAGNOSIS — Z72.51 HIGH RISK HETEROSEXUAL BEHAVIOR: ICD-10-CM

## 2025-06-06 LAB
HIV 1+2 AB+HIV1 P24 AG SERPL QL IA: NEGATIVE
T PALLIDUM IGG+IGM SER QL: NEGATIVE

## 2025-06-06 PROCEDURE — 86593 SYPHILIS TEST NON-TREP QUANT: CPT

## 2025-06-06 PROCEDURE — 99213 OFFICE O/P EST LOW 20 MIN: CPT | Mod: PBBFAC | Performed by: OBSTETRICS & GYNECOLOGY

## 2025-06-06 PROCEDURE — 36415 COLL VENOUS BLD VENIPUNCTURE: CPT

## 2025-06-06 PROCEDURE — 87389 HIV-1 AG W/HIV-1&-2 AB AG IA: CPT

## 2025-06-06 PROCEDURE — 99999 PR PBB SHADOW E&M-EST. PATIENT-LVL III: CPT | Mod: PBBFAC,,, | Performed by: OBSTETRICS & GYNECOLOGY

## (undated) DEVICE — SUT JJ41G O VICRYL

## (undated) DEVICE — PACK ENDOSCOPY GENERAL

## (undated) DEVICE — ELECTRODE REM PLYHSV RETURN 9

## (undated) DEVICE — CLIPPER BLADE MOD 4406 (CAREF)

## (undated) DEVICE — CANISTER SUCTION 2 LTR

## (undated) DEVICE — CLOSURE SKIN STERI STRIP 1/2X4

## (undated) DEVICE — SUT VICRYL PLUS ANTIBACT

## (undated) DEVICE — SEE MEDLINE ITEM 154981

## (undated) DEVICE — SOL 9P NACL IRR PIC IL

## (undated) DEVICE — SPONGE LAP 18X18 PREWASHED

## (undated) DEVICE — PAD SANITARY OB STERILE

## (undated) DEVICE — MATRIX HEMSTAT FLOSEAL 5ML

## (undated) DEVICE — COVER OVERHEAD SURG LT BLUE

## (undated) DEVICE — TRAY FOLEY 16FR INFECTION CONT

## (undated) DEVICE — SUT CHROMIC 2-0 CT1 36IN BR

## (undated) DEVICE — Device